# Patient Record
Sex: FEMALE | Race: WHITE | NOT HISPANIC OR LATINO | Employment: OTHER | URBAN - METROPOLITAN AREA
[De-identification: names, ages, dates, MRNs, and addresses within clinical notes are randomized per-mention and may not be internally consistent; named-entity substitution may affect disease eponyms.]

---

## 2017-04-26 RX ORDER — ATORVASTATIN CALCIUM 20 MG/1
TABLET, FILM COATED ORAL
Qty: 30 TABLET | Refills: 3 | Status: SHIPPED | OUTPATIENT
Start: 2017-04-26 | End: 2017-11-09 | Stop reason: SDUPTHER

## 2017-11-10 RX ORDER — ATORVASTATIN CALCIUM 20 MG/1
TABLET, FILM COATED ORAL
Qty: 30 TABLET | Refills: 0 | Status: SHIPPED | OUTPATIENT
Start: 2017-11-10 | End: 2017-12-19 | Stop reason: SDUPTHER

## 2017-12-19 RX ORDER — ATORVASTATIN CALCIUM 20 MG/1
TABLET, FILM COATED ORAL
Qty: 30 TABLET | Refills: 0 | Status: SHIPPED | OUTPATIENT
Start: 2017-12-19 | End: 2018-02-01 | Stop reason: SDUPTHER

## 2018-02-01 RX ORDER — ATORVASTATIN CALCIUM 20 MG/1
TABLET, FILM COATED ORAL
Qty: 30 TABLET | Refills: 0 | Status: SHIPPED | OUTPATIENT
Start: 2018-02-01 | End: 2018-03-26 | Stop reason: SDUPTHER

## 2018-02-08 ENCOUNTER — OFFICE VISIT (OUTPATIENT)
Dept: FAMILY MEDICINE CLINIC | Facility: CLINIC | Age: 63
End: 2018-02-08

## 2018-02-08 VITALS
OXYGEN SATURATION: 96 % | DIASTOLIC BLOOD PRESSURE: 70 MMHG | RESPIRATION RATE: 16 BRPM | SYSTOLIC BLOOD PRESSURE: 150 MMHG | HEIGHT: 65 IN | BODY MASS INDEX: 20.26 KG/M2 | WEIGHT: 121.6 LBS | HEART RATE: 89 BPM | TEMPERATURE: 99.8 F

## 2018-02-08 DIAGNOSIS — J10.1 INFLUENZA A: ICD-10-CM

## 2018-02-08 DIAGNOSIS — R68.89 FLU-LIKE SYMPTOMS: Primary | ICD-10-CM

## 2018-02-08 LAB
FLUAV AG NPH QL: POSITIVE
FLUBV AG NPH QL IA: NEGATIVE

## 2018-02-08 PROCEDURE — 87804 INFLUENZA ASSAY W/OPTIC: CPT | Performed by: NURSE PRACTITIONER

## 2018-02-08 PROCEDURE — 99213 OFFICE O/P EST LOW 20 MIN: CPT | Performed by: NURSE PRACTITIONER

## 2018-02-08 RX ORDER — BENZONATATE 100 MG/1
100 CAPSULE ORAL 3 TIMES DAILY PRN
Qty: 21 CAPSULE | Refills: 0 | Status: SHIPPED | OUTPATIENT
Start: 2018-02-08 | End: 2018-03-01

## 2018-02-08 RX ORDER — OSELTAMIVIR PHOSPHATE 75 MG/1
75 CAPSULE ORAL 2 TIMES DAILY
Qty: 10 CAPSULE | Refills: 0 | Status: SHIPPED | OUTPATIENT
Start: 2018-02-08 | End: 2018-03-01

## 2018-02-08 NOTE — PATIENT INSTRUCTIONS
tamiflu 75mg 2x day for 5 days.   Tessalon perles 100mg up to 3x day as needed for cough.  May try mucinex OTC as needed for cough.  Encouraged smoking sensation, encouraged f/u appt for annual exam.  Tylenol OTC as needed for aches or fever.   If symptoms persist call office.   Increase fluid intake, get plenty of rest.   Patient agrees with plan of care and understands instructions. Call if worsening symptoms or any problems or concerns.

## 2018-02-08 NOTE — PROGRESS NOTES
Mack Zaragoza is a 62 y.o. female.     History of Present Illness   C/o cough, body ache,s chills, fever, HA, sore throat, nausea, she denies vomiting or diarrhea, states symptoms started about 3 days ago, she did not check temp at home, she has had fatigue, she has low grade temp today, she did not get flu shot, she states her daughter has been sick.     The following portions of the patient's history were reviewed and updated as appropriate: allergies, current medications, past family history, past medical history, past social history, past surgical history and problem list.    Review of Systems   Constitutional: Positive for chills, diaphoresis, fatigue and fever.   HENT: Positive for congestion, rhinorrhea, sinus pressure and sore throat. Negative for ear pain.    Eyes: Negative for pain.   Respiratory: Positive for cough. Negative for shortness of breath.    Cardiovascular: Negative for chest pain.   Gastrointestinal: Positive for nausea. Negative for diarrhea and vomiting.   Musculoskeletal: Positive for myalgias. Negative for arthralgias.   Neurological: Positive for headaches. Negative for dizziness and light-headedness.   All other systems reviewed and are negative.      Objective   Physical Exam   Constitutional: She is oriented to person, place, and time. She appears well-developed and well-nourished.   HENT:   Head: Normocephalic.   Right Ear: Tympanic membrane and ear canal normal.   Left Ear: Tympanic membrane and ear canal normal.   Nose: Right sinus exhibits maxillary sinus tenderness. Left sinus exhibits maxillary sinus tenderness.   Mouth/Throat: Uvula is midline and mucous membranes are normal. Posterior oropharyngeal erythema present.   Eyes: Pupils are equal, round, and reactive to light.   Neck: Neck supple.   Cardiovascular: Normal rate, regular rhythm and normal heart sounds.    Pulmonary/Chest: Effort normal. No respiratory distress. She has decreased breath sounds in the right  lower field and the left lower field. She has no wheezes. She has no rhonchi. She has no rales.   Musculoskeletal: Normal range of motion.   Neurological: She is alert and oriented to person, place, and time.   Skin: Skin is warm and dry.   Psychiatric: She has a normal mood and affect. Her behavior is normal. Judgment and thought content normal.   Nursing note and vitals reviewed.      Assessment/Plan   Arline was seen today for flu symptoms.    Diagnoses and all orders for this visit:    Flu-like symptoms  -     Influenza Antigen, Rapid - Swab, Nasopharynx    Influenza A    Other orders  -     oseltamivir (TAMIFLU) 75 MG capsule; Take 1 capsule by mouth 2 (Two) Times a Day.  -     benzonatate (TESSALON PERLES) 100 MG capsule; Take 1 capsule by mouth 3 (Three) Times a Day As Needed for Cough.      tamiflu 75mg 2x day for 5 days.   Tessalon perles 100mg up to 3x day as needed for cough.  May try mucinex OTC as needed for cough.  Encouraged smoking sensation, encouraged f/u appt for annual exam.  Tylenol OTC as needed for aches or fever.   If symptoms persist call office.   Increase fluid intake, get plenty of rest.   Patient agrees with plan of care and understands instructions. Call if worsening symptoms or any problems or concerns.

## 2018-03-01 ENCOUNTER — TELEPHONE (OUTPATIENT)
Dept: FAMILY MEDICINE CLINIC | Facility: CLINIC | Age: 63
End: 2018-03-01

## 2018-03-01 ENCOUNTER — OFFICE VISIT (OUTPATIENT)
Dept: FAMILY MEDICINE CLINIC | Facility: CLINIC | Age: 63
End: 2018-03-01

## 2018-03-01 ENCOUNTER — RESULTS ENCOUNTER (OUTPATIENT)
Dept: FAMILY MEDICINE CLINIC | Facility: CLINIC | Age: 63
End: 2018-03-01

## 2018-03-01 VITALS
OXYGEN SATURATION: 96 % | BODY MASS INDEX: 20.12 KG/M2 | HEIGHT: 65 IN | TEMPERATURE: 99 F | HEART RATE: 82 BPM | RESPIRATION RATE: 16 BRPM | SYSTOLIC BLOOD PRESSURE: 122 MMHG | WEIGHT: 120.8 LBS | DIASTOLIC BLOOD PRESSURE: 84 MMHG

## 2018-03-01 DIAGNOSIS — Z12.11 COLON CANCER SCREENING: ICD-10-CM

## 2018-03-01 DIAGNOSIS — F17.200 CURRENT SMOKER: ICD-10-CM

## 2018-03-01 DIAGNOSIS — M79.641 RIGHT HAND PAIN: ICD-10-CM

## 2018-03-01 DIAGNOSIS — Z12.31 ENCOUNTER FOR SCREENING MAMMOGRAM FOR BREAST CANCER: ICD-10-CM

## 2018-03-01 DIAGNOSIS — M54.50 RIGHT-SIDED LOW BACK PAIN WITHOUT SCIATICA, UNSPECIFIED CHRONICITY: ICD-10-CM

## 2018-03-01 DIAGNOSIS — E78.5 HYPERLIPIDEMIA, UNSPECIFIED HYPERLIPIDEMIA TYPE: Primary | ICD-10-CM

## 2018-03-01 LAB
ALBUMIN SERPL-MCNC: 4.6 G/DL (ref 3.5–5.2)
ALBUMIN/GLOB SERPL: 1.6 G/DL
ALP SERPL-CCNC: 91 U/L (ref 39–117)
ALT SERPL W P-5'-P-CCNC: 26 U/L (ref 1–33)
ANION GAP SERPL CALCULATED.3IONS-SCNC: 13.4 MMOL/L
AST SERPL-CCNC: 29 U/L (ref 1–32)
BACTERIA UR QL AUTO: ABNORMAL /HPF
BILIRUB SERPL-MCNC: 0.8 MG/DL (ref 0.1–1.2)
BILIRUB UR QL STRIP: ABNORMAL
BUN BLD-MCNC: 8 MG/DL (ref 8–23)
BUN/CREAT SERPL: 11.3 (ref 7–25)
CALCIUM SPEC-SCNC: 9.5 MG/DL (ref 8.6–10.5)
CHLORIDE SERPL-SCNC: 101 MMOL/L (ref 98–107)
CHOLEST SERPL-MCNC: 167 MG/DL (ref 0–200)
CHROMATIN AB SERPL-ACNC: <10 IU/ML (ref 0–14)
CLARITY UR: CLEAR
CO2 SERPL-SCNC: 26.6 MMOL/L (ref 22–29)
COLOR UR: YELLOW
CREAT BLD-MCNC: 0.71 MG/DL (ref 0.57–1)
CRP SERPL-MCNC: 0.21 MG/DL (ref 0–0.5)
ERYTHROCYTE [DISTWIDTH] IN BLOOD BY AUTOMATED COUNT: 13.6 % (ref 4.5–15)
ERYTHROCYTE [SEDIMENTATION RATE] IN BLOOD: 14 MM/HR (ref 0–30)
GFR SERPL CREATININE-BSD FRML MDRD: 83 ML/MIN/1.73
GLOBULIN UR ELPH-MCNC: 2.8 GM/DL
GLUCOSE BLD-MCNC: 105 MG/DL (ref 65–99)
GLUCOSE UR STRIP-MCNC: NEGATIVE MG/DL
HCT VFR BLD AUTO: 44.9 % (ref 31–42)
HDLC SERPL-MCNC: 85 MG/DL (ref 40–60)
HGB BLD-MCNC: 14.8 G/DL (ref 12–18)
HGB UR QL STRIP.AUTO: NEGATIVE
KETONES UR QL STRIP: ABNORMAL
LDLC SERPL CALC-MCNC: 59 MG/DL (ref 0–100)
LDLC/HDLC SERPL: 0.69 {RATIO}
LEUKOCYTE ESTERASE UR QL STRIP.AUTO: NEGATIVE
LYMPHOCYTES # BLD AUTO: 2.5 10*3/MM3 (ref 1.2–3.4)
LYMPHOCYTES NFR BLD AUTO: 22.5 % (ref 21–51)
MCH RBC QN AUTO: 30.7 PG (ref 26.1–33.1)
MCHC RBC AUTO-ENTMCNC: 33 G/DL (ref 33–37)
MCV RBC AUTO: 93 FL (ref 80–99)
MONOCYTES # BLD AUTO: 0.7 10*3/MM3 (ref 0.1–0.6)
MONOCYTES NFR BLD AUTO: 6.1 % (ref 2–9)
NEUTROPHILS # BLD AUTO: 7.8 10*3/MM3 (ref 1.4–6.5)
NEUTROPHILS NFR BLD AUTO: 71.4 % (ref 42–75)
NITRITE UR QL STRIP: NEGATIVE
PH UR STRIP.AUTO: 7 [PH] (ref 4.6–8)
PLATELET # BLD AUTO: 161 10*3/MM3 (ref 150–450)
PMV BLD AUTO: 9.4 FL (ref 7.1–10.5)
POTASSIUM BLD-SCNC: 4.1 MMOL/L (ref 3.5–5.2)
PROT SERPL-MCNC: 7.4 G/DL (ref 6–8.5)
PROT UR QL STRIP: ABNORMAL
RBC # BLD AUTO: 4.83 10*6/MM3 (ref 4–6)
RBC # UR: ABNORMAL /HPF
REF LAB TEST METHOD: ABNORMAL
SODIUM BLD-SCNC: 141 MMOL/L (ref 136–145)
SP GR UR STRIP: 1.02 (ref 1–1.03)
SQUAMOUS #/AREA URNS HPF: ABNORMAL /HPF
TRIGL SERPL-MCNC: 117 MG/DL (ref 0–150)
TSH SERPL DL<=0.05 MIU/L-ACNC: 1.28 MIU/ML (ref 0.27–4.2)
URATE SERPL-MCNC: 4 MG/DL (ref 2.4–5.7)
UROBILINOGEN UR QL STRIP: ABNORMAL
VLDLC SERPL-MCNC: 23.4 MG/DL (ref 5–40)
WBC NRBC COR # BLD: 10.9 10*3/MM3 (ref 4.5–10)
WBC UR QL AUTO: ABNORMAL /HPF

## 2018-03-01 PROCEDURE — 80053 COMPREHEN METABOLIC PANEL: CPT | Performed by: NURSE PRACTITIONER

## 2018-03-01 PROCEDURE — 81001 URINALYSIS AUTO W/SCOPE: CPT | Performed by: NURSE PRACTITIONER

## 2018-03-01 PROCEDURE — 84550 ASSAY OF BLOOD/URIC ACID: CPT | Performed by: NURSE PRACTITIONER

## 2018-03-01 PROCEDURE — 85025 COMPLETE CBC W/AUTO DIFF WBC: CPT | Performed by: NURSE PRACTITIONER

## 2018-03-01 PROCEDURE — 73130 X-RAY EXAM OF HAND: CPT | Performed by: NURSE PRACTITIONER

## 2018-03-01 PROCEDURE — 86431 RHEUMATOID FACTOR QUANT: CPT | Performed by: NURSE PRACTITIONER

## 2018-03-01 PROCEDURE — 99214 OFFICE O/P EST MOD 30 MIN: CPT | Performed by: NURSE PRACTITIONER

## 2018-03-01 PROCEDURE — 84443 ASSAY THYROID STIM HORMONE: CPT | Performed by: NURSE PRACTITIONER

## 2018-03-01 PROCEDURE — 85652 RBC SED RATE AUTOMATED: CPT | Performed by: NURSE PRACTITIONER

## 2018-03-01 PROCEDURE — 36415 COLL VENOUS BLD VENIPUNCTURE: CPT | Performed by: NURSE PRACTITIONER

## 2018-03-01 PROCEDURE — 86140 C-REACTIVE PROTEIN: CPT | Performed by: NURSE PRACTITIONER

## 2018-03-01 PROCEDURE — 86038 ANTINUCLEAR ANTIBODIES: CPT | Performed by: NURSE PRACTITIONER

## 2018-03-01 PROCEDURE — 80061 LIPID PANEL: CPT | Performed by: NURSE PRACTITIONER

## 2018-03-01 RX ORDER — ASPIRIN 81 MG/1
81 TABLET ORAL DAILY
COMMUNITY

## 2018-03-01 NOTE — TELEPHONE ENCOUNTER
PATIENT WAS SEEN TODAY AND CALLED HER INSURANCE COMPANY TO SEE IF LOW DOSE CT LUNG CANCER SCREENING WAS COVERED. INSURANCE COMPANY STATED THAT DR. OFFICE WOULD HAVE TO INSURANCE COMPANY AND LET THEM KNOW THAT THE CT WAS GOING TO BE SET UP AND WHEN

## 2018-03-01 NOTE — PATIENT INSTRUCTIONS
Right hand xray today will call with results.  Labs today will call with results.   UA today,   If back pain persists call office.   Apply ice to hand, elevate, wrist splint. May try OTC tylenol as needed for pain, if persists call office will refer to hand.  Refer for screening mammo, CBE today.  Encouraged smoking cessation,   She will check with insurance for low dose CT, deferred today.  cologuard ordered.   Encouraged low chol diet and exercise, cont f/u with vascular as scheduled.   Monitor BP call with elevated readings, WNL today.   Increase fluid intake, get plenty of rest.   Patient agrees with plan of care and understands instructions. Call if worsening symptoms or any problems or concerns.

## 2018-03-01 NOTE — PROGRESS NOTES
Subjective   Arline Zaragoza is a 63 y.o. female.     History of Present Illness   Here today to f/u, she is fasting today, due for labs. She currently takes lipitor 20mg daily, baby asa 81mg daily. She states she has noticed elevated BP, he had US neck, had surgery on carotids, sees Dr. Arnulfo Hernandez, vascular for this,  this week and elevated BP, she was sick at last visit and noticed elevated BP. She denies CP, SOA, LE edema, change in vision, HA.   She does smoke 1/2 ppd for about 40 + years. She has tried quit, she tried chantix but had mood changes.   She also c/o right wrist swelling, unsure of injury, she noticed swelling about 3 days ago, she states she has pain with turning, she does use computer daily, she states turning hurts, she denies weakness, she does not see hand surgery. She did not try any thing at home for this.   She has never had colonoscopy, she would like to try cologuard.   She is due for mammo, she states last mammo 2 years ago, she does not see GYN. She has had hysterectomy, total but has ovaries, she states last pap years ago.   She has right back pain,started last night, she denies dysuria, denies frequency, she denies vag bleeding or d/c, she denies fever. Hx of ruptured disc, she went to PT for this, she denies any recent injury to back. Denies sciatic pain.   The following portions of the patient's history were reviewed and updated as appropriate: allergies, current medications, past family history, past medical history, past social history, past surgical history and problem list.    Review of Systems   Constitutional: Negative for chills, diaphoresis and fever.   Eyes: Negative for photophobia and visual disturbance.   Respiratory: Negative for cough and shortness of breath.    Cardiovascular: Negative for chest pain.   Musculoskeletal: Positive for arthralgias and myalgias.   Skin: Negative for pallor.   Neurological: Negative for dizziness, light-headedness and headaches.   All other  systems reviewed and are negative.      Objective   Physical Exam   Constitutional: She is oriented to person, place, and time. She appears well-developed and well-nourished.   HENT:   Head: Normocephalic.   Eyes: Pupils are equal, round, and reactive to light.   Neck: Neck supple. Carotid bruit is not present.   Cardiovascular: Normal rate, regular rhythm and normal heart sounds.    Pulses:       Radial pulses are 2+ on the right side, and 2+ on the left side.        Dorsalis pedis pulses are 2+ on the right side, and 2+ on the left side.        Posterior tibial pulses are 2+ on the right side, and 2+ on the left side.   Pulmonary/Chest: Effort normal and breath sounds normal. No respiratory distress. She has no decreased breath sounds. She has no wheezes. She has no rhonchi. She has no rales. Right breast exhibits no inverted nipple, no mass, no nipple discharge, no skin change and no tenderness. Left breast exhibits no inverted nipple, no mass, no nipple discharge, no skin change and no tenderness.   Abdominal: There is no CVA tenderness.   Musculoskeletal: Normal range of motion.        Right hand: She exhibits bony tenderness and swelling. She exhibits normal range of motion, no tenderness and normal capillary refill. Normal sensation noted. Normal strength noted.        Hands:  Neurological: She is alert and oriented to person, place, and time.   Skin: Skin is warm and dry.   Psychiatric: She has a normal mood and affect. Her behavior is normal. Judgment and thought content normal.   Nursing note and vitals reviewed.  right hand xray in office for pain, no comparison, shows NAD, awaiting radiology over read.     Assessment/Plan   Arline was seen today for follow-up and wrist pain.    Diagnoses and all orders for this visit:    Hyperlipidemia, unspecified hyperlipidemia type  -     CBC & Differential  -     Comprehensive Metabolic Panel  -     Lipid Panel  -     TSH  -     Urinalysis With Microscopic - Urine,  Clean Catch  -     XR Hand 3+ View Right (In Office)  -     Uric acid  -     Rheumatoid Factor, Quant  -     Sedimentation Rate  -     C-reactive Protein  -     REYNA  -     Urinalysis - Urine, Clean Catch  -     Urinalysis, Microscopic Only - Urine, Clean Catch  -     CBC Auto Differential    Right hand pain  -     CBC & Differential  -     Comprehensive Metabolic Panel  -     Lipid Panel  -     TSH  -     Urinalysis With Microscopic - Urine, Clean Catch  -     XR Hand 3+ View Right (In Office)  -     Uric acid  -     Rheumatoid Factor, Quant  -     Sedimentation Rate  -     C-reactive Protein  -     REYNA  -     Miscellaneous DME  -     Urinalysis - Urine, Clean Catch  -     Urinalysis, Microscopic Only - Urine, Clean Catch  -     CBC Auto Differential    Current smoker  -     CBC & Differential  -     Comprehensive Metabolic Panel  -     Lipid Panel  -     TSH  -     Urinalysis With Microscopic - Urine, Clean Catch  -     XR Hand 3+ View Right (In Office)  -     Uric acid  -     Rheumatoid Factor, Quant  -     Sedimentation Rate  -     C-reactive Protein  -     REYNA  -     Urinalysis - Urine, Clean Catch  -     Urinalysis, Microscopic Only - Urine, Clean Catch  -     CBC Auto Differential    Right-sided low back pain without sciatica, unspecified chronicity  -     CBC & Differential  -     Comprehensive Metabolic Panel  -     Lipid Panel  -     TSH  -     Urinalysis With Microscopic - Urine, Clean Catch  -     XR Hand 3+ View Right (In Office)  -     Uric acid  -     Rheumatoid Factor, Quant  -     Sedimentation Rate  -     C-reactive Protein  -     REYNA  -     Urinalysis - Urine, Clean Catch  -     Urinalysis, Microscopic Only - Urine, Clean Catch  -     CBC Auto Differential    Encounter for screening mammogram for breast cancer  -     Mammo Screening Bilateral With CAD; Future    Colon cancer screening  -     Cologuard - Stool, Per Rectum; Future        Right hand xray today will call with results.  Labs today will  call with results.   UA today,   If back pain persists call office.   Apply ice to hand, elevate, wrist splint. May try OTC tylenol as needed for pain, if persists call office will refer to hand.  Refer for screening mammo, CBE today.  Encouraged smoking cessation,   She will check with insurance for low dose CT, deferred today.  cologuard ordered.   Encouraged low chol diet and exercise, cont f/u with vascular as scheduled.   Monitor BP call with elevated readings, WNL today.   Increase fluid intake, get plenty of rest.   Patient agrees with plan of care and understands instructions. Call if worsening symptoms or any problems or concerns.

## 2018-03-02 DIAGNOSIS — F17.200 CURRENT SMOKER: Primary | ICD-10-CM

## 2018-03-02 DIAGNOSIS — M54.50 RIGHT-SIDED LOW BACK PAIN WITHOUT SCIATICA, UNSPECIFIED CHRONICITY: Primary | ICD-10-CM

## 2018-03-02 LAB — ANA SER QL: NEGATIVE

## 2018-03-05 ENCOUNTER — TELEPHONE (OUTPATIENT)
Dept: FAMILY MEDICINE CLINIC | Facility: CLINIC | Age: 63
End: 2018-03-05

## 2018-03-05 NOTE — TELEPHONE ENCOUNTER
----- Message from IGNACIO Gutierrez sent at 3/2/2018  8:45 AM EST -----  Please call patient with results. Arthritis labs are normal, thyroid is normal, BS, kidney and liver func are normal. CHOL is good. Urine shows protein, may want to repeat next week, lab only appt to ensure resolution.    Pt informed of lab results

## 2018-03-12 ENCOUNTER — TELEPHONE (OUTPATIENT)
Dept: FAMILY MEDICINE CLINIC | Facility: CLINIC | Age: 63
End: 2018-03-12

## 2018-03-12 NOTE — TELEPHONE ENCOUNTER
Right hand xray is normal other than arthritis at index finger, is she still having pain? Did she try the brace?

## 2018-03-13 NOTE — TELEPHONE ENCOUNTER
Pt informed of Xray results and she stated her hand hurts a little bit with a little swelling. She did try the brace

## 2018-03-14 DIAGNOSIS — M79.641 RIGHT HAND PAIN: Primary | ICD-10-CM

## 2018-03-20 LAB — SCAN RESULT: NORMAL

## 2018-03-26 ENCOUNTER — TELEPHONE (OUTPATIENT)
Dept: FAMILY MEDICINE CLINIC | Facility: CLINIC | Age: 63
End: 2018-03-26

## 2018-03-26 RX ORDER — ATORVASTATIN CALCIUM 20 MG/1
20 TABLET, FILM COATED ORAL EVERY EVENING
Qty: 90 TABLET | Refills: 2 | Status: SHIPPED | OUTPATIENT
Start: 2018-03-26 | End: 2019-03-05 | Stop reason: SDUPTHER

## 2018-07-05 ENCOUNTER — OFFICE VISIT (OUTPATIENT)
Dept: ORTHOPEDIC SURGERY | Facility: CLINIC | Age: 63
End: 2018-07-05

## 2018-07-05 ENCOUNTER — OFFICE VISIT (OUTPATIENT)
Dept: FAMILY MEDICINE CLINIC | Facility: CLINIC | Age: 63
End: 2018-07-05

## 2018-07-05 VITALS
HEART RATE: 72 BPM | TEMPERATURE: 97.2 F | BODY MASS INDEX: 20.09 KG/M2 | WEIGHT: 125 LBS | HEIGHT: 66 IN | RESPIRATION RATE: 18 BRPM | OXYGEN SATURATION: 99 %

## 2018-07-05 VITALS
DIASTOLIC BLOOD PRESSURE: 90 MMHG | BODY MASS INDEX: 20.83 KG/M2 | OXYGEN SATURATION: 99 % | HEIGHT: 65 IN | HEART RATE: 82 BPM | WEIGHT: 125 LBS | TEMPERATURE: 98.3 F | SYSTOLIC BLOOD PRESSURE: 152 MMHG

## 2018-07-05 DIAGNOSIS — M25.571 ACUTE RIGHT ANKLE PAIN: ICD-10-CM

## 2018-07-05 DIAGNOSIS — S93.401A SPRAIN OF RIGHT ANKLE, UNSPECIFIED LIGAMENT, INITIAL ENCOUNTER: Primary | ICD-10-CM

## 2018-07-05 DIAGNOSIS — S92.351A CLOSED DISPLACED FRACTURE OF FIFTH METATARSAL BONE OF RIGHT FOOT, INITIAL ENCOUNTER: Primary | ICD-10-CM

## 2018-07-05 DIAGNOSIS — S92.351A CLOSED DISPLACED FRACTURE OF FIFTH METATARSAL BONE OF RIGHT FOOT, INITIAL ENCOUNTER: ICD-10-CM

## 2018-07-05 DIAGNOSIS — Z72.0 TOBACCO ABUSE: ICD-10-CM

## 2018-07-05 PROCEDURE — 73610 X-RAY EXAM OF ANKLE: CPT | Performed by: INTERNAL MEDICINE

## 2018-07-05 PROCEDURE — 99213 OFFICE O/P EST LOW 20 MIN: CPT | Performed by: INTERNAL MEDICINE

## 2018-07-05 PROCEDURE — 28470 CLTX METATARSAL FX WO MNP EA: CPT | Performed by: ORTHOPAEDIC SURGERY

## 2018-07-05 PROCEDURE — 73630 X-RAY EXAM OF FOOT: CPT | Performed by: INTERNAL MEDICINE

## 2018-07-05 PROCEDURE — 99406 BEHAV CHNG SMOKING 3-10 MIN: CPT | Performed by: ORTHOPAEDIC SURGERY

## 2018-07-05 PROCEDURE — 99204 OFFICE O/P NEW MOD 45 MIN: CPT | Performed by: ORTHOPAEDIC SURGERY

## 2018-07-05 NOTE — PROGRESS NOTES
Subjective   Arline Zaragoza is a 63 y.o. female.   Walking is a.m. felt-year-old foot and ankle her bone pop with pain in both foot and ankle more so in foot  History of Present Illness   As above ankle pain as well as foot pain does note that she is crit coming up visit strip Tuesday was make sure things are satisfactory for that.  No history of ankle    Review of Systems   Musculoskeletal:        Right ankle pain   All other systems reviewed and are negative.      Objective   Vitals:    07/05/18 1307   BP: 152/90   Pulse: 82   Temp: 98.3 °F (36.8 °C)   SpO2: 99%   Weight: 56.7 kg (125 lb)     Physical Exam   Constitutional: She appears well-developed and well-nourished.   HENT:   Head: Normocephalic and atraumatic.   Eyes: Conjunctivae are normal. Pupils are equal, round, and reactive to light.   Cardiovascular: Normal rate, regular rhythm and normal heart sounds.    Pulmonary/Chest: Effort normal and breath sounds normal.   Musculoskeletal:   Right posterior tibial and dorsalis pedis pulses are 2+ mild swelling of the lateral ankle.  Minimal bruising there as well as lateral dorsal foot.  Ankles nontender palpation.  Do have focal tenderness over the fourth and fifth metatarsals mid shaft and slightly distal two thirds.  Mild pain with gentle bowing of foot.  Did not stress ankle pinning x-rays which did not show a spiral fracture of the   Nursing note and vitals reviewed.      Lab Results   Component Value Date    INR 0.99 09/07/2016       Procedures  X-ray right ankle 3 views obtained.  No comparison available.  No bony soft tissue abnormalities noted.    X-ray right foot 2 views obtained.  No comparison available.  We have a spiral fracture mildly displaced fifth metatarsal we want surgeon to see today.  Assessment/Plan   1.  Fracture right fifth metatarsal plan nothing by mouth see orthopedic surgeon today    2.  Right ankle pain/sprain refer to orthopedic surgeon on this.  Did not stress ankle due to other  existing injury.    Much of this encounter note is an electronic transcription/translation of spoken language to printed text.  The electronic translation of spoken language may permit erroneous, or at times, nonsensical words or phrases to be inadvertently transcribed.  Although I have reviewed the note for such errors, some may still exist. If there are questions or for further clarification, please contact me.

## 2018-07-05 NOTE — PROGRESS NOTES
New Patient Complaint      Patient: Arilne Zaragoza  YOB: 1955 63 y.o. female  Medical Record Number: [id number]    Chief Complaints: I hurt my foot and ankle    History of Present Illness:  Patient sustained an injury this morning when she tripped walking through her house.  She's had onset of severe constant aching pain with bruising and swelling mainly in the distal lateral forefoot and some to the anterolateral ankle worse with sitting driving and walking.  She was seen by her PCP this morning where x-rays were made and she was worked in today for further evaluation.        HPI    Allergies: No Known Allergies    Medications:   Current Outpatient Prescriptions on File Prior to Visit   Medication Sig   • aspirin 81 MG EC tablet Take 81 mg by mouth Daily.   • atorvastatin (LIPITOR) 20 MG tablet Take 1 tablet by mouth Every Evening.     No current facility-administered medications on file prior to visit.        Past Medical History:   Diagnosis Date   • Back pain     2005 had ruptured disc no surgery so prn c/o pain   • Cancer (CMS/HCC)     cervical   • Carotid artery stenosis    • Fibromuscular dysplasia (CMS/HCC)     dx 2 yrs ago   • Hyperlipidemia    • Psoriasis      Past Surgical History:   Procedure Laterality Date   • PARTIAL HYSTERECTOMY     • VA THROMBOENDARTECTMY NECK,NECK INCIS Left 9/14/2016    Procedure: LT CAROTID ENDARTERECTOMY WITH INTRA OPERATIVE CAROTID DUPLEX SCAN;  Surgeon: Arnulfo Hernandez MD;  Location: [facility];  Service: Vascular     Social History     Occupational History   • Not on file.     Social History Main Topics   • Smoking status: Current Every Day Smoker     Packs/day: 1.00     Years: 40.00     Types: Cigarettes, Electronic Cigarette   • Smokeless tobacco: Never Used   • Alcohol use 0.6 - 1.2 oz/week     1 - 2 Cans of beer per week   • Drug use: No   • Sexual activity: Not on file      Social History     Social History Narrative   • No narrative on file  "    Family History   Problem Relation Age of Onset   • Vision loss Mother        Review of Systems: 14 point review of systems performed, positive pertinent findings identified in HPI. All remaining systems negative     Review of Systems      Physical Exam:   Vitals:    07/05/18 1602   Pulse: 72   Resp: 18   Temp: 97.2 °F (36.2 °C)   SpO2: 99%   Weight: 56.7 kg (125 lb)   Height: 166.4 cm (65.5\")   PainSc: 10-Worst pain ever     Physical Exam   Constitutional: pleasant, well developed   Eyes: sclera non icteric  Hearing : adequate for exam  Cardiovascular: palpable pulses in right foot, right calf/ thigh NT without sign of DVT  Respiratoy: breathing unlabored   Neurological: grossly sensate to LT throughout right LE  Psychiatric: oriented with normal mood and affect.   Lymphatic: No palpable popliteal lymphadenopathy right LE  Skin: intact throughout right leg/foot  Musculoskeletal: Mild swelling with ecchymosis over the anterolateral ligamentous structures and minimal discomfort along the peroneal tendons.  She was able to actively harry without subluxation of peroneal tendons grossly stable anterior drawer but exam was limited due to pain and swelling.  No focal pain over the syndesmosis to palpation nor with proximal fibular compression or external rotation.  Nontender over the medial deltoid.    Moderate swelling and ecchymosis over the distal lateral forefoot with tenderness to palpation but no clinical deformity.  Nontender over the dorsomedial midfoot or forefoot.  Physical Exam  Ortho Exam    Radiology: 3 views the right ankle that she brought in today were reviewed.  Also ordered and reviewed 3 views of the right foot to evaluate for fracture.  The disc that she brought in did not have the foot x-rays on it.    I do not see any obvious fracture or malalignment around the ankle and no widening of the syndesmosis.    There is an oblique fracture of the distal one half of the fifth metatarsal with slight " shortening but no angular deformity    Assessment/Plan: 1.  Right ankle anterolateral ligamentous sprain with peroneal tendinitis  2.  Right fifth metatarsal fracture    I reviewed treatment options with her today and we'll hold off on any further imaging on her ankle that she has only minimal discomfort along the peroneal tendons.    I reviewed with her that I feel that her fifth metatarsal fracture is amenable to nonoperative treatment at this time but should it displace or fail to heal and may require surgical treatment.    She was fitted with a boot today and may do partial foot flat weightbearing with crutches    Regarding persistent habitual smoking.  I have discussed for at least 4 minutes with the patient the ill effects of continued smoking on pulmonary and cardiovascular health as well as financial burden.  I also discussed at length the effects on peripheral circulation as well as inhibition of soft tissue and bone healing.  I've recommended that they speak with their primary care physician regarding cessation techniques.    I will see her back in 2 week's with x-rays of her right foot

## 2018-07-09 ENCOUNTER — HOSPITAL ENCOUNTER (EMERGENCY)
Facility: HOSPITAL | Age: 63
Discharge: HOME | End: 2018-07-09
Attending: EMERGENCY MEDICINE | Admitting: EMERGENCY MEDICINE

## 2018-07-09 ENCOUNTER — TELEPHONE (OUTPATIENT)
Dept: ORTHOPEDIC SURGERY | Facility: CLINIC | Age: 63
End: 2018-07-09

## 2018-07-09 VITALS
BODY MASS INDEX: 20.83 KG/M2 | OXYGEN SATURATION: 99 % | RESPIRATION RATE: 18 BRPM | DIASTOLIC BLOOD PRESSURE: 82 MMHG | HEIGHT: 65 IN | TEMPERATURE: 98.7 F | HEART RATE: 64 BPM | WEIGHT: 125 LBS | SYSTOLIC BLOOD PRESSURE: 184 MMHG

## 2018-07-09 DIAGNOSIS — S92.351A DISPLACED FRACTURE OF FIFTH METATARSAL BONE, RIGHT FOOT, INITIAL ENCOUNTER FOR CLOSED FRACTURE: ICD-10-CM

## 2018-07-09 DIAGNOSIS — R60.0 EDEMA OF RIGHT FOOT: Primary | ICD-10-CM

## 2018-07-09 PROCEDURE — 99283 EMERGENCY DEPT VISIT LOW MDM: CPT

## 2018-07-09 NOTE — TELEPHONE ENCOUNTER
I returned called the patient's daughter and patient was with her.  She been doing partial foot flat weightbearing evidently and over the last 2 days developed significant increase in swelling to her right foot leg and calf.  She denied any chest pain or shortness of breath.  I recommended continued elevation and to limit weightbearing and to go to the emergency room for evaluation for DVT.  They voiced a clear understanding of this and will proceed accordingly

## 2018-07-09 NOTE — ED PROVIDER NOTES
EMERGENCY DEPARTMENT ENCOUNTER    Room Number:  39/39  Date seen:  7/9/2018  Time seen: 7:13 PM  PCP: Ryder Freed MD   Orthopedic surgeon- Dr Ko  Historian: patient, family  History Limited By: N/A      HPI:  Chief Complaint: foot swelling   Context: Arline Zaragoza is a 63 y.o. female who states that on 7/5/18, she slipped and fell while walking and then had onset of right foot pain. She reports that she followed up with Dr Ko, underwent right ankle xray that showed no acute fracture and right foot xray that showed right 5th metatarsal fracture, and was fitted with orthoboot. However, despite using the orthoboot, she has had increased right foot swelling. Today, she noticed bruising to right foot. Pt denies right thigh pain, right calf pain, increased right foot pain, chest pain, dyspnea, N/V/D, sensory loss, fevers, chills, and hx of blood clots. She reports that she called Dr Ko's office earlier today, was told to contact PMD, and PMD's office referred her to ED for further evaluation. She is not on any blood thinners. Pt has no other complaints at this time.    Location: right foot  Radiation: none  Severity: moderate  Duration: started on 7/5/18  Onset quality: gradual  Associated Symptoms: right foot swelling, right foot bruising  Previous treatment(s): Pt reports that she called Dr Ko's office earlier today, was told to contact PMD, and PMD's office referred her to ED for further evaluation.         PAST MEDICAL HISTORY  Active Ambulatory Problems     Diagnosis Date Noted   • Hyperlipidemia    • Cancer (CMS/HCC)    • CAD (coronary artery disease)    • Left carotid stenosis 09/14/2016   • Closed displaced fracture of fifth metatarsal bone of right foot 07/05/2018   • Tobacco abuse 07/05/2018   • Sprain of right ankle 07/05/2018     Resolved Ambulatory Problems     Diagnosis Date Noted   • No Resolved Ambulatory Problems     Past Medical History:   Diagnosis Date   • Back pain    •  Cancer (CMS/HCC)    • Carotid artery stenosis    • Fibromuscular dysplasia (CMS/HCC)    • Hyperlipidemia    • Psoriasis        PAST SURGICAL HISTORY  Past Surgical History:   Procedure Laterality Date   • PARTIAL HYSTERECTOMY     • [place] THROMBOENDARTECTMY NECK,NECK INCIS Left 9/14/2016    Procedure: LT CAROTID ENDARTERECTOMY WITH INTRA OPERATIVE CAROTID DUPLEX SCAN;  Surgeon: Arnulfo Hernandez MD;  Location: [facility] OR;  Service: Vascular       FAMILY HISTORY  Family History   Problem Relation Age of Onset   • Vision loss Mother        SOCIAL HISTORY  Social History     Social History   • Marital status: Single     Spouse name: N/A   • Number of children: N/A   • Years of education: N/A     Occupational History   • Not on file.     Social History Main Topics   • Smoking status: Current Every Day Smoker     Packs/day: 1.00     Years: 40.00     Types: Cigarettes, Electronic Cigarette   • Smokeless tobacco: Never Used   • Alcohol use 0.6 - 1.2 oz/week     1 - 2 Cans of beer per week   • Drug use: No   • Sexual activity: Not on file     Other Topics Concern   • Not on file     Social History Narrative   • No narrative on file       ALLERGIES  Patient has no known allergies.          REVIEW OF SYSTEMS  Review of Systems   Constitutional: Negative for chills and fever.   HENT: Negative for sore throat.    Respiratory: Negative for cough.    Cardiovascular: Negative for chest pain.   Gastrointestinal: Negative for nausea and vomiting.   Genitourinary: Negative for difficulty urinating and dysuria.   Musculoskeletal: Negative for back pain.        Increased right foot swelling, right foot bruising, no increase in right foot pain (from right 5th metatarsal fracture)   Neurological: Negative for numbness.   Psychiatric/Behavioral: The patient is not nervous/anxious.    All other systems reviewed and are negative.            PHYSICAL EXAM  ED Triage Vitals   Temp Heart Rate Resp BP SpO2   07/09/18 1737 07/09/18 1737  07/09/18 1737 07/09/18 1748 07/09/18 1737   97.7 °F (36.5 °C) 91 16 172/84 98 % WNL      Temp src Heart Rate Source Patient Position BP Location FiO2 (%)   07/09/18 1737 07/09/18 1737 07/09/18 1748 07/09/18 1748 --   Tympanic Monitor Sitting Right arm        Physical Exam   Constitutional: She is oriented to person, place, and time. She appears distressed (mild).   HENT:   Head: Normocephalic.   Eyes: EOM are normal.   Neck: Normal range of motion. Neck supple.   Cardiovascular: Normal rate, regular rhythm and normal heart sounds.    No murmur heard.  Pulses:       Dorsalis pedis pulses are 2+ on the right side.   Pulmonary/Chest: Effort normal and breath sounds normal. No respiratory distress. She has no wheezes. She has no rhonchi. She has no rales.   Abdominal: Soft. Bowel sounds are normal. She exhibits no distension. There is no tenderness.   Musculoskeletal:   No right medial thigh tenderness, no tenderness in right posterior knee or right calf, no right calf swelling, swelling and bruising of right ankle and right foot, tenderness to lateral aspect of right foot, capillary refill is 1 to 2 seconds to RLE, NV intact distally to RLE   Neurological: She is alert and oriented to person, place, and time. She has normal sensation.   Skin: Skin is warm and dry.   Psychiatric: Mood and affect normal.   Nursing note and vitals reviewed.          PROCEDURES  Procedures        MEDICATIONS GIVEN IN ER  Medications - No data to display              PROGRESS AND CONSULTS       1940- Informed pt that RLE venous duplex is not indicated at this time as pt's sx are localized to her right foot and do not extend into the right leg, she does not have right calf tenderness, and sx are expected for fracture in right foot. Advised pt to apply ice to right foot, to elevate RLE, to remain nonweightbearing on RLE, and to wear her orthoboot. Instructed to f/u closely with orthopedic surgeon for recheck. RTER warnings given. Pt  understands and agrees with plan. Addressed all questions.            MEDICAL DECISION MAKING      MDM  Number of Diagnoses or Management Options  Displaced fracture of fifth metatarsal bone, right foot, initial encounter for closed fracture:   Edema of right foot:      Amount and/or Complexity of Data Reviewed  Decide to obtain previous medical records or to obtain history from someone other than the patient: yes  Review and summarize past medical records: yes (On 7/5/18, pt was seen by Dr Ko for right foot pain s/p fall, underwent right ankle xray that showed no acute fracture and right foot xray that showed right 5th metatarsal fracture, and was fitted with orthoboot. )    Patient Progress  Patient progress: stable                 DIAGNOSIS  Final diagnoses:   Edema of right foot   Displaced fracture of fifth metatarsal bone, right foot, initial encounter for closed fracture               DISPOSITION  DISCHARGE    Patient discharged in stable condition.    Reviewed implications of diagnosis, meds, responsibility to follow up, warning signs and symptoms of possible worsening, potential complications and reasons to return to ER.    Patient/Family voiced understanding of above instructions.    Discussed plan for discharge, as there is no emergent indication for admission.  Pt/family is agreeable and understands need for follow up and repeat testing.  Pt is aware that discharge does not mean that nothing is wrong but it indicates no emergency is present and they must continue care with follow-up as given below or physician of their choice.     FOLLOW-UP  Trace Ko MD  [id number] [id number] [phone]    Schedule an appointment as soon as possible for a visit             Latest Documented Vital Signs:  As of 7:49 PM  BP- 172/84 HR- 80 Temp- 97.7 °F (36.5 °C) (Tympanic) O2 sat- 99%            --  Documentation assistance provided by sherif Jeronimo for Dr. Pete MD.   Information recorded by the scribe was done at my direction and has been verified and validated by me.         Marcus Jeronimo  07/09/18 2005       Henry Freeman MD  07/09/18 3663

## 2018-07-09 NOTE — DISCHARGE INSTRUCTIONS
Continue to ice and elevate your right foot.  Continue to wear your splint and follow up with your doctor.

## 2018-07-12 ENCOUNTER — TELEPHONE (OUTPATIENT)
Dept: SOCIAL WORK | Facility: HOSPITAL | Age: 63
End: 2018-07-12

## 2018-07-23 ENCOUNTER — OFFICE VISIT (OUTPATIENT)
Dept: ORTHOPEDIC SURGERY | Facility: CLINIC | Age: 63
End: 2018-07-23

## 2018-07-23 VITALS — BODY MASS INDEX: 20.83 KG/M2 | TEMPERATURE: 98.9 F | WEIGHT: 125 LBS | HEIGHT: 65 IN

## 2018-07-23 DIAGNOSIS — Z87.81 HISTORY OF FRACTURE: Primary | ICD-10-CM

## 2018-07-23 DIAGNOSIS — S92.351D CLOSED DISPLACED FRACTURE OF FIFTH METATARSAL BONE OF RIGHT FOOT WITH ROUTINE HEALING, SUBSEQUENT ENCOUNTER: ICD-10-CM

## 2018-07-23 DIAGNOSIS — S93.401D SPRAIN OF RIGHT ANKLE, UNSPECIFIED LIGAMENT, SUBSEQUENT ENCOUNTER: ICD-10-CM

## 2018-07-23 PROCEDURE — 73630 X-RAY EXAM OF FOOT: CPT | Performed by: ORTHOPAEDIC SURGERY

## 2018-07-23 PROCEDURE — 99212 OFFICE O/P EST SF 10 MIN: CPT | Performed by: ORTHOPAEDIC SURGERY

## 2018-07-23 NOTE — PROGRESS NOTES
"Foot Follow Up      Patient: Arline Zaragoza    YOB: 1955 63 y.o. female    Chief Complaints: Foot doing better    History of Present Illness: She was seen on 7/5/18 for a right fifth metatarsal fracture and ankle sprain that occurred that morning.    Since then she's been using her boot and crutches except going to the bathroom occasionally with no crutches.  She has only slight occasional achiness to the ankle and along the peroneal tendons and mild discomfort that is intermittent along the lateral aspect of the right foot that has improved.  HPI    ROS: Foot pain, no numbness tingling chest pain or shortness of breath  Past Medical History:   Diagnosis Date   • Back pain     2005 had ruptured disc no surgery so prn c/o pain   • Cancer (CMS/HCC)     cervical   • Carotid artery stenosis    • Fibromuscular dysplasia (CMS/HCC)     dx 2 yrs ago   • Hyperlipidemia    • Psoriasis      Physical Exam:   Vitals:    07/23/18 1032   Temp: 98.9 °F (37.2 °C)   Weight: 56.7 kg (125 lb)   Height: 165.1 cm (65\")     Well developed with normal mood.Right ankle shows slight tenderness over the anterolateral ligamentous structures but no focal pain along the peroneal tendons.  Mild discomfort along the fifth metatarsal with diminished swelling.  Nontender over the dorsum of the midfoot      Radiology: 3 views of the right foot ordered to evaluate alignment reviewed and compared with previous x-rays.  There is been no change in alignment to the oblique distal fifth metatarsal fracture there is no significant angular deformity only slight shortening      Assessment/Plan:  1.  Right ankle anterolateral ligamentous sprain with resolved peroneal tendinitis  2.  Right fifth metatarsal fracture    Reviewed her that I would not recommend anything from a surgical standpoint at this time overall she feels like she is \"much better\".  She'll continue with partial foot flat weightbearing with 2 crutches for another 10-14 days and his " pain has improved at that time may transition to one crutch under the contralateral arm that have demonstrated for her today as pain allows.    I'll see her back in 3 weeks' x-rays of her right foot

## 2018-08-13 ENCOUNTER — OFFICE VISIT (OUTPATIENT)
Dept: ORTHOPEDIC SURGERY | Facility: CLINIC | Age: 63
End: 2018-08-13

## 2018-08-13 VITALS — TEMPERATURE: 97.4 F | BODY MASS INDEX: 20.09 KG/M2 | WEIGHT: 125 LBS | HEIGHT: 66 IN

## 2018-08-13 DIAGNOSIS — S92.351D CLOSED DISPLACED FRACTURE OF FIFTH METATARSAL BONE OF RIGHT FOOT WITH ROUTINE HEALING, SUBSEQUENT ENCOUNTER: Primary | ICD-10-CM

## 2018-08-13 DIAGNOSIS — S93.401D SPRAIN OF RIGHT ANKLE, UNSPECIFIED LIGAMENT, SUBSEQUENT ENCOUNTER: ICD-10-CM

## 2018-08-13 PROCEDURE — 99212 OFFICE O/P EST SF 10 MIN: CPT | Performed by: ORTHOPAEDIC SURGERY

## 2018-08-13 PROCEDURE — 73630 X-RAY EXAM OF FOOT: CPT | Performed by: ORTHOPAEDIC SURGERY

## 2018-08-14 NOTE — PROGRESS NOTES
"Foot Follow Up      Patient: Arline Zaragoza    YOB: 1955 63 y.o. female    Chief Complaints: Foot is \"a lot better\"    History of Present Illness: Follows up fracture the right fifth metatarsal with ankle sprain that occurred on 7/5/18.  She was seen that day.  She was last seen on 7/23/18 with instructions to continue with partial foot flat weightbearing with 2 crutches and allow her to repair transition to one crutch as pain allows.  She has been continuing with 2 crutches as she felt awkward with one crutch.  She was using an under the ipsilateral arm.  Overall she feels much better with only slight occasional achiness in the right foot with some swelling at night that improves with elevation.  HPI    ROS: Foot pain  Past Medical History:   Diagnosis Date   • Back pain     2005 had ruptured disc no surgery so prn c/o pain   • Cancer (CMS/HCC)     cervical   • Carotid artery stenosis    • Fibromuscular dysplasia (CMS/HCC)     dx 2 yrs ago   • Hyperlipidemia    • Psoriasis      Physical Exam:   Vitals:    08/13/18 1530   Temp: 97.4 °F (36.3 °C)   TempSrc: Temporal Artery    Weight: 56.7 kg (125 lb)   Height: 166.4 cm (65.5\")     Well developed with normal mood.Right foot shows minimal swelling no warmth erythema and minimal if any discomfort palpation along the fifth metatarsal.  Minimal discomfort over the anterolateral ligamentous structures to the ankle was stable anterior drawer      Radiology: 3 views the right foot ordered to evaluate fracture alignment reviewed and compared with previous x-rays.  His been no change in alignment to the oblique distal fifth metatarsal fracture with some early callus formation      Assessment/Plan:  1.  Right ankle anterolateral ligamentous sprain with resolved peroneal tendinitis  2.  Right fifth metatarsal fracture    She continues to improve and we'll allow her to transition to one crutch under the contralateral arm which I demonstrated for her today again.  If " pain continues to improve over the next 10-14 days she may then transition to just her boot.    I will see her back in 4 weeks' x-rays of her right foot

## 2018-09-10 ENCOUNTER — OFFICE VISIT (OUTPATIENT)
Dept: ORTHOPEDIC SURGERY | Facility: CLINIC | Age: 63
End: 2018-09-10

## 2018-09-10 VITALS — HEIGHT: 65 IN | BODY MASS INDEX: 20.93 KG/M2 | TEMPERATURE: 99.3 F | WEIGHT: 125.6 LBS

## 2018-09-10 DIAGNOSIS — Z09 FRACTURE FOLLOW-UP: Primary | ICD-10-CM

## 2018-09-10 DIAGNOSIS — S93.401D SPRAIN OF RIGHT ANKLE, UNSPECIFIED LIGAMENT, SUBSEQUENT ENCOUNTER: ICD-10-CM

## 2018-09-10 DIAGNOSIS — S92.351D CLOSED DISPLACED FRACTURE OF FIFTH METATARSAL BONE OF RIGHT FOOT WITH ROUTINE HEALING, SUBSEQUENT ENCOUNTER: ICD-10-CM

## 2018-09-10 PROCEDURE — 73630 X-RAY EXAM OF FOOT: CPT | Performed by: ORTHOPAEDIC SURGERY

## 2018-09-10 PROCEDURE — 99024 POSTOP FOLLOW-UP VISIT: CPT | Performed by: ORTHOPAEDIC SURGERY

## 2018-09-10 NOTE — PROGRESS NOTES
"Foot Follow Up      Patient: Arline Zaragoza    YOB: 1955 63 y.o. female    Chief Complaints: Foot feeling better    History of Present Illness: Patient follows up right fifth metatarsal fracture with ankle sprain that occurred on 7/5/18 with initial evaluation that day.  She was last seen on 8/13/18 and has still been using 1 crutch and occasionally going with just her boot without any pain in the foot or ankle.  HPI    ROS: No Foot pain  Past Medical History:   Diagnosis Date   • Back pain     2005 had ruptured disc no surgery so prn c/o pain   • Cancer (CMS/HCC)     cervical   • Carotid artery stenosis    • Fibromuscular dysplasia (CMS/HCC)     dx 2 yrs ago   • Hyperlipidemia    • Psoriasis      Physical Exam:   Vitals:    09/10/18 1506   Temp: 99.3 °F (37.4 °C)   TempSrc: Temporal Artery    Weight: 57 kg (125 lb 9.6 oz)   Height: 165.1 cm (65\")     Well developed with normal mood.  Right foot showed minimal if any swelling and really no tenderness to palpation around the fifth metatarsal.  Nontender over the anterolateral ankle with stable anterior drawer.  No pain along the peroneal tendons      Radiology: 3 views the right foot ordered to evaluate fracture of the fifth metatarsal reviewed and compared with previous x-rays.  There is been no change in alignment to the oblique distal fifth metatarsal fracture with increased callus formation      Assessment/Plan:  1.  Right ankle anterolateral ligamentous sprain with resolved peroneal tendinitis  2.  Right fifth metatarsal fracture    Overall she continues to improve.  She will wean to just her boot for the next 7-10 days and if without pain may then wean out of her boot around the house but uses 30th athletic shoe for 7-10 days.  If he remains without pain at that point she may wean out of the boot altogether.    She states her ankle is feeling much better and I would not recommend any further workup for that.    If at any point she has return of pain " she'll get back in her boot and let me know otherwise I'll see her back in 4 weeks' x-rays of her right foot

## 2018-10-03 NOTE — PROGRESS NOTES
"Foot Follow Up      Patient: Arline Zaragoza    YOB: 1955 63 y.o. female    Chief Complaints: Foot getting better    History of Present Illness:Patient follows up right fifth metatarsal fracture with ankle sprain that occurred on 7/5/18 with initial evaluation that day.  She was last seen on 9/10/18 he was time she is still been using 1 crutch and occasionally going without her boot without pain in the foot or ankle.  Instructions were given for weaning out of her boot.    He states that her foot feels better than it did last time and she is only recently started weaning out of her boot with minimal if any discomfort palpation along the fifth metatarsal  HPI    ROS: Foot pain  Past Medical History:   Diagnosis Date   • Back pain     2005 had ruptured disc no surgery so prn c/o pain   • Cancer (CMS/HCC)     cervical   • Carotid artery stenosis    • Fibromuscular dysplasia (CMS/HCC)     dx 2 yrs ago   • Hyperlipidemia    • Psoriasis      Physical Exam:   Vitals:    10/08/18 1450   Temp: 98.7 °F (37.1 °C)   TempSrc: Temporal Artery    Weight: 56.7 kg (125 lb)   Height: 165.1 cm (65\")     Well developed with normal mood.  Right foot shows minimal discomfort palpation on the fifth metatarsal no clinical deformity with minimal swelling.  Right calf is nontender without sign of DVT.  Right ankle was nontender over the anterolateral ligaments were stable anterior drawer      Radiology: 3 views of the right foot ordered to evaluate fracture reviewed and compared to previous x-rays do show no change in alignment to the oblique distal fifth metatarsal fracture with increased callus formation      Assessment/Plan:  1.  Right ankle anterolateral ligamentous sprain with resolved peroneal tendinitis  2.  Right fifth metatarsal fracture    Overall she seems be improving I would not recommend anything from a surgical standpoint at this time.  She may continue weaning out of her boot initially around the house with sturdy " accommodative shoes and if pain continues to improve then weaning out of the house.    If anything worsens she'll let me know otherwise I will see her back in 4 weeks' x-rays of the right foot

## 2018-10-08 ENCOUNTER — OFFICE VISIT (OUTPATIENT)
Dept: ORTHOPEDIC SURGERY | Facility: CLINIC | Age: 63
End: 2018-10-08

## 2018-10-08 VITALS — BODY MASS INDEX: 20.83 KG/M2 | WEIGHT: 125 LBS | HEIGHT: 65 IN | TEMPERATURE: 98.7 F

## 2018-10-08 DIAGNOSIS — S93.401D SPRAIN OF RIGHT ANKLE, UNSPECIFIED LIGAMENT, SUBSEQUENT ENCOUNTER: ICD-10-CM

## 2018-10-08 DIAGNOSIS — S92.351D CLOSED DISPLACED FRACTURE OF FIFTH METATARSAL BONE OF RIGHT FOOT WITH ROUTINE HEALING, SUBSEQUENT ENCOUNTER: Primary | ICD-10-CM

## 2018-10-08 PROCEDURE — 99213 OFFICE O/P EST LOW 20 MIN: CPT | Performed by: ORTHOPAEDIC SURGERY

## 2018-10-08 PROCEDURE — 73630 X-RAY EXAM OF FOOT: CPT | Performed by: ORTHOPAEDIC SURGERY

## 2018-11-08 ENCOUNTER — OFFICE VISIT (OUTPATIENT)
Dept: ORTHOPEDIC SURGERY | Facility: CLINIC | Age: 63
End: 2018-11-08

## 2018-11-08 VITALS — BODY MASS INDEX: 21.16 KG/M2 | TEMPERATURE: 98.4 F | HEIGHT: 65 IN | WEIGHT: 127 LBS

## 2018-11-08 DIAGNOSIS — S92.351D CLOSED DISPLACED FRACTURE OF FIFTH METATARSAL BONE OF RIGHT FOOT WITH ROUTINE HEALING, SUBSEQUENT ENCOUNTER: Primary | ICD-10-CM

## 2018-11-08 DIAGNOSIS — S93.401D SPRAIN OF RIGHT ANKLE, UNSPECIFIED LIGAMENT, SUBSEQUENT ENCOUNTER: ICD-10-CM

## 2018-11-08 PROCEDURE — 73630 X-RAY EXAM OF FOOT: CPT | Performed by: ORTHOPAEDIC SURGERY

## 2018-11-08 PROCEDURE — 99213 OFFICE O/P EST LOW 20 MIN: CPT | Performed by: ORTHOPAEDIC SURGERY

## 2018-11-08 NOTE — PROGRESS NOTES
"Foot Follow Up      Patient: Arline Zaragoza    YOB: 1955 63 y.o. female    Chief Complaints: Foot feeling better    History of Present Illness:Patient follows up right fifth metatarsal fracture with ankle sprain that occurred on 7/5/18 with initial evaluation that day.   she was last seen on 10/8/18 at that time her foot felt better and she recently started weaning out of her boot with only minimal if any discomfort along the fifth metatarsal.  Instructions were given to wean out of her boot and a sturdy accommodative shoes as pain allowed.    She's been out of her boot around the house and occasionally out of the house with a sturdy accommodative shoe without significant complaints of pain in the right foot.  HPI    ROS: No Foot pain  Past Medical History:   Diagnosis Date   • Back pain     2005 had ruptured disc no surgery so prn c/o pain   • Cancer (CMS/HCC)     cervical   • Carotid artery stenosis    • Fibromuscular dysplasia (CMS/HCC)     dx 2 yrs ago   • Hyperlipidemia    • Psoriasis      Physical Exam:   Vitals:    11/08/18 1607   Temp: 98.4 °F (36.9 °C)   Weight: 57.6 kg (127 lb)   Height: 165.1 cm (65\")     Well developed with normal mood.  Right foot showed no focal swelling warmth erythema and there was no tenderness to palpation around the fifth metatarsal.  She was nontender over the anterolateral ligamentous structures to the ankle with stable anterior drawer      Radiology: 3 views the right foot ordered to evaluate fracture reviewed and compared with previous x-rays these continue to show good alignment to the fifth metatarsal fracture with callus formation      Assessment/Plan:  1.  Right ankle anterolateral ligamentous sprain with resolved peroneal tendinitis  2.  Right fifth metatarsal fracture    Overall she's doing very well she may start weaning gradually out of her boot entirely and understands use a sturdy accommodative shoe.    She may advance activities slowly as tolerated and I " will see her back in 1 month with x-rays of her right foot.

## 2019-03-05 RX ORDER — ATORVASTATIN CALCIUM 20 MG/1
TABLET, FILM COATED ORAL
Qty: 30 TABLET | Refills: 0 | Status: SHIPPED | OUTPATIENT
Start: 2019-03-05 | End: 2019-07-26 | Stop reason: SDUPTHER

## 2019-04-08 ENCOUNTER — TELEPHONE (OUTPATIENT)
Dept: FAMILY MEDICINE CLINIC | Facility: CLINIC | Age: 64
End: 2019-04-08

## 2019-04-08 NOTE — TELEPHONE ENCOUNTER
How much prednisone is she taking? We don't have it listed on her med list. I can see if it is a contraindication

## 2019-04-08 NOTE — TELEPHONE ENCOUNTER
Patient informed, she will check and see if she needs to have completed her Hep A and B immunizations prior to trip per their recommendations. She will schedule these after completed with Prednisone but can come anytime for Tetanus

## 2019-04-08 NOTE — TELEPHONE ENCOUNTER
Patient is currently on Prednisone 5mg daily x 6 weeks now, she goes back to specialist end of this month(wrist surgery)for CRPS

## 2019-04-08 NOTE — TELEPHONE ENCOUNTER
Per immunization resource: Hepatitis A and B does not contain live virus, so it may be used in patients with immunodeficiency (which long term prednisone can cause). However, response to vaccination may be suboptimal so it may be better to take after she finishes prednisone. Immunosuppression is not a contraindication or precaution with tetanus and she can get that anytime.

## 2019-04-08 NOTE — TELEPHONE ENCOUNTER
PATIENT IS GOING OUT OF THE COUNTRY TO [facility] AND NEEDS HEP A AND B AND TETANUS SHOTS.  PATIENT IS ON PREDNISONE FOR CRPS FROM SURGERY SHE HAD IN DEC.  SHOULD SHE WAIT UNTIL SHE STOPS TAKING THE PREDNISONE BEFORE GETTING THE SHOTS.

## 2019-04-29 ENCOUNTER — TELEPHONE (OUTPATIENT)
Dept: FAMILY MEDICINE CLINIC | Facility: CLINIC | Age: 64
End: 2019-04-29

## 2019-05-01 ENCOUNTER — CLINICAL SUPPORT (OUTPATIENT)
Dept: FAMILY MEDICINE CLINIC | Facility: CLINIC | Age: 64
End: 2019-05-01

## 2019-05-01 PROCEDURE — 90746 HEPB VACCINE 3 DOSE ADULT IM: CPT | Performed by: NURSE PRACTITIONER

## 2019-05-01 PROCEDURE — 90632 HEPA VACCINE ADULT IM: CPT | Performed by: NURSE PRACTITIONER

## 2019-05-01 PROCEDURE — 90472 IMMUNIZATION ADMIN EACH ADD: CPT | Performed by: NURSE PRACTITIONER

## 2019-05-01 PROCEDURE — 90471 IMMUNIZATION ADMIN: CPT | Performed by: NURSE PRACTITIONER

## 2019-05-01 PROCEDURE — 90715 TDAP VACCINE 7 YRS/> IM: CPT | Performed by: NURSE PRACTITIONER

## 2019-05-03 ENCOUNTER — OFFICE VISIT (OUTPATIENT)
Dept: ORTHOPEDIC SURGERY | Facility: CLINIC | Age: 64
End: 2019-05-03

## 2019-05-03 ENCOUNTER — TELEPHONE (OUTPATIENT)
Dept: ORTHOPEDIC SURGERY | Facility: CLINIC | Age: 64
End: 2019-05-03

## 2019-05-03 VITALS — HEIGHT: 65 IN | TEMPERATURE: 98 F | BODY MASS INDEX: 21.66 KG/M2 | WEIGHT: 130 LBS

## 2019-05-03 DIAGNOSIS — S92.025A CLOSED NONDISPLACED FRACTURE OF ANTERIOR PROCESS OF LEFT CALCANEUS, INITIAL ENCOUNTER: ICD-10-CM

## 2019-05-03 DIAGNOSIS — M79.672 PAIN IN LEFT FOOT: Primary | ICD-10-CM

## 2019-05-03 PROCEDURE — 28400 CLTX CALCANEAL FX W/O MNPJ: CPT | Performed by: ORTHOPAEDIC SURGERY

## 2019-05-03 PROCEDURE — 99214 OFFICE O/P EST MOD 30 MIN: CPT | Performed by: ORTHOPAEDIC SURGERY

## 2019-05-03 PROCEDURE — 73630 X-RAY EXAM OF FOOT: CPT | Performed by: ORTHOPAEDIC SURGERY

## 2019-05-03 NOTE — TELEPHONE ENCOUNTER
[facility] patient called stating she may have broken her left foot. Hit is on a wooden box yesterday. Some swelling and bruising and she is not able to bear weight. Please advise.

## 2019-05-03 NOTE — PROGRESS NOTES
New Patient Complaint      Patient: Arline Zaragoza  YOB: 1955 64 y.o. female  Medical Record Number: [id number]    Chief Complaints: I hurt my foot    History of Present Illness: Patient sustained injury to her left inferolateral hindfoot yesterday when she was starting to sit crosslegged and struck the side of her foot on a wooden box and felt and heard a crack.  She did not twist her ankle but has had persistent moderate constant aching pain with swelling and bruising over the inferolateral aspect the left hindfoot worse with standing and walking improved with rest.    I treated her in the past for a right fifth metatarsal fracture and she was last seen for this on 11/8/2018 and had no complaints of right foot pain today         HPI    Allergies: No Known Allergies    Medications:   Current Outpatient Medications on File Prior to Visit   Medication Sig   • aspirin 81 MG EC tablet Take 81 mg by mouth Daily.   • atorvastatin (LIPITOR) 20 MG tablet TAKE 1 TABLET EVERY EVENING     No current facility-administered medications on file prior to visit.        Past Medical History:   Diagnosis Date   • Back pain     2005 had ruptured disc no surgery so prn c/o pain   • Cancer (CMS/HCC)     cervical   • Carotid artery stenosis    • Fibromuscular dysplasia (CMS/HCC)     dx 2 yrs ago   • Hyperlipidemia    • Psoriasis      Past Surgical History:   Procedure Laterality Date   • PARTIAL HYSTERECTOMY     • [place] THROMBOENDARTECTMY NECK,NECK INCIS Left 9/14/2016    Procedure: LT CAROTID ENDARTERECTOMY WITH INTRA OPERATIVE CAROTID DUPLEX SCAN;  Surgeon: Arnulfo Hernandez MD;  Location: [facility];  Service: Vascular     Social History     Occupational History   • Not on file   Tobacco Use   • Smoking status: Current Every Day Smoker     Packs/day: 1.00     Years: 40.00     Pack years: 40.00     Types: Cigarettes, Electronic Cigarette   • Smokeless tobacco: Never Used   Substance and Sexual Activity   • Alcohol  "use: Yes     Alcohol/week: 0.6 - 1.2 oz     Types: 1 - 2 Cans of beer per week   • Drug use: No   • Sexual activity: Defer      Social History     Social History Narrative   • Not on file     Family History   Problem Relation Age of Onset   • Vision loss Mother        Review of Systems: 14 point review of systems performed, positive pertinent findings identified in HPI. All remaining systems negative     Review of Systems      Physical Exam:   Vitals:    05/03/19 1313   Temp: 98 °F (36.7 °C)   Weight: 59 kg (130 lb)   Height: 165.1 cm (65\")     Physical Exam   Constitutional: pleasant, well developed   Eyes: sclera non icteric  Hearing : adequate for exam  Cardiovascular: palpable pulses in left foot, left calf/ thigh NT without sign of DVT  Respiratoy: breathing unlabored   Neurological: grossly sensate to LT throughout left LE  Psychiatric: oriented with normal mood and affect.   Lymphatic: No palpable popliteal lymphadenopathy left LE  Skin: intact throughout left leg/foot  Musculoskeletal: Examination left hindfoot and ankle shows no warmth erythema there is mild swelling over the inferolateral aspect of the hindfoot with bruising and tenderness to palpation of the anterior process the calcaneus.  She was nontender over the medial or lateral aspect of the ankle nor along the peroneal tendons or dorsum of the midfoot or forefoot.  Physical Exam  Ortho Exam    Radiology: 3 views of the left foot ordered to evaluate pain reviewed with her and no prior x-rays available for comparison there appears to be a nondisplaced fracture of the anterior process the calcaneus but no midfoot fractures or malalignment.    Assessment/Plan: 1.  Left anterior process calcaneus fracture    Reviewed with her that do not see any clinical sign of ankle injury or peroneal tendon pathology.    She still has her boot from the contralateral side and she will do touchdown foot flat weightbearing with 2 crutches in her boot for the next 7 to " 10 days and if pain improves she may then go to 50% weightbearing with 2 crutches in her boot.  She will do this for a week and if pain continues to improve she may use one crutch under the contralateral arm and her boot.    If anything worsens to let me know otherwise I will see her back in 3 weeks x-rays of her left foot.    Reviewed with her that I do not anticipate surgical treatment for this unless she has persistent pain and would require excision of this area.

## 2019-05-03 NOTE — TELEPHONE ENCOUNTER
Left answering machine message for patient to call me back to confirm appt today with MWM at 1:00.

## 2019-05-29 ENCOUNTER — OFFICE VISIT (OUTPATIENT)
Dept: ORTHOPEDIC SURGERY | Facility: CLINIC | Age: 64
End: 2019-05-29

## 2019-05-29 VITALS — WEIGHT: 130 LBS | BODY MASS INDEX: 20.4 KG/M2 | HEIGHT: 67 IN | TEMPERATURE: 98.6 F

## 2019-05-29 DIAGNOSIS — S92.025D CLOSED NONDISPLACED FRACTURE OF ANTERIOR PROCESS OF LEFT CALCANEUS WITH ROUTINE HEALING, SUBSEQUENT ENCOUNTER: ICD-10-CM

## 2019-05-29 DIAGNOSIS — Z09 FRACTURE FOLLOW-UP: Primary | ICD-10-CM

## 2019-05-29 PROCEDURE — 73630 X-RAY EXAM OF FOOT: CPT | Performed by: ORTHOPAEDIC SURGERY

## 2019-05-29 PROCEDURE — 99024 POSTOP FOLLOW-UP VISIT: CPT | Performed by: ORTHOPAEDIC SURGERY

## 2019-05-29 NOTE — PROGRESS NOTES
"Foot Follow Up      Patient: Arline Zaragoza    YOB: 1955 64 y.o. female    Chief Complaints: Foot feeling better    History of Present Illness: Patient was seen on 5/3/2019 1 day after injuring her left inferolateral hindfoot when she was starting to sit crosslegged and struck the side of her foot on a wooden box and felt and heard a crack but did not twist her ankle.  She was felt to have a anterior process calcaneus fracture and was placed into a boot with recommendations for use of crutches meaning to one crutch as pain allowed    Prior to this she was treated for right fifth metatarsal fracture last seen for this on 11/8/2019 and has no persistent complaints of right foot pain    Her left foot is feeling better and she is been on one crutch for about a week now.  She has only occasional ache in the inferolateral hindfoot  HPI    ROS: Foot pain  Past Medical History:   Diagnosis Date   • Back pain     2005 had ruptured disc no surgery so prn c/o pain   • Cancer (CMS/HCC)     cervical   • Carotid artery stenosis    • Fibromuscular dysplasia (CMS/HCC)     dx 2 yrs ago   • Hyperlipidemia    • Psoriasis      Physical Exam:   Vitals:    05/29/19 1010   Temp: 98.6 °F (37 °C)   TempSrc: Temporal   Weight: 59 kg (130 lb)   Height: 170.2 cm (67\")   PainSc:   1   PainLoc: Foot     Well developed with normal mood.  On exam she has minimal discomfort of the inferolateral hindfoot and 5 out of 5 eversion strength without discomfort along the peroneal tendons.  Nontender over the dorsum of the midfoot      Radiology: 3 views of the left foot ordered to evaluate anterior process calcaneus fracture reviewed and compared to prior x-rays.  The anterior process calcaneus fracture appears to be consolidating without appreciable displacement.      Assessment/Plan: 1.  Left anterior process calcaneus fracture    She continues to improve and may wean off of her crutches.  She will continue with her boot for an additional 7 " to 10 days subsequent to that and then begin weaning out to an ASO brace that she will use for the next 3 to 4 weeks and if symptoms improve she may then wean out of it.  She declined any formal therapy if symptoms persist or worsen she will let me know otherwise I will see her back as needed.    Current pain is rated a 1 out of 10

## 2019-06-03 ENCOUNTER — CLINICAL SUPPORT (OUTPATIENT)
Dept: FAMILY MEDICINE CLINIC | Facility: CLINIC | Age: 64
End: 2019-06-03

## 2019-06-03 PROCEDURE — 90746 HEPB VACCINE 3 DOSE ADULT IM: CPT | Performed by: FAMILY MEDICINE

## 2019-06-03 PROCEDURE — 90471 IMMUNIZATION ADMIN: CPT | Performed by: FAMILY MEDICINE

## 2019-07-26 RX ORDER — ATORVASTATIN CALCIUM 20 MG/1
TABLET, FILM COATED ORAL
Qty: 30 TABLET | Refills: 2 | Status: SHIPPED | OUTPATIENT
Start: 2019-07-26 | End: 2019-12-02 | Stop reason: SDUPTHER

## 2019-12-02 RX ORDER — ATORVASTATIN CALCIUM 20 MG/1
TABLET, FILM COATED ORAL
Qty: 30 TABLET | Refills: 0 | Status: SHIPPED | OUTPATIENT
Start: 2019-12-02 | End: 2020-02-19 | Stop reason: SDUPTHER

## 2020-02-19 ENCOUNTER — OFFICE VISIT (OUTPATIENT)
Dept: FAMILY MEDICINE CLINIC | Facility: CLINIC | Age: 65
End: 2020-02-19

## 2020-02-19 ENCOUNTER — TELEPHONE (OUTPATIENT)
Dept: FAMILY MEDICINE CLINIC | Facility: CLINIC | Age: 65
End: 2020-02-19

## 2020-02-19 VITALS
BODY MASS INDEX: 19.46 KG/M2 | HEIGHT: 67 IN | OXYGEN SATURATION: 99 % | WEIGHT: 124 LBS | DIASTOLIC BLOOD PRESSURE: 70 MMHG | TEMPERATURE: 98 F | SYSTOLIC BLOOD PRESSURE: 120 MMHG | HEART RATE: 69 BPM | RESPIRATION RATE: 18 BRPM

## 2020-02-19 DIAGNOSIS — E78.2 MIXED HYPERLIPIDEMIA: Primary | ICD-10-CM

## 2020-02-19 DIAGNOSIS — I25.10 CORONARY ARTERY DISEASE INVOLVING NATIVE CORONARY ARTERY OF NATIVE HEART WITHOUT ANGINA PECTORIS: ICD-10-CM

## 2020-02-19 LAB
ALBUMIN SERPL-MCNC: 4.4 G/DL (ref 3.5–5.2)
ALBUMIN/GLOB SERPL: 1.7 G/DL
ALP SERPL-CCNC: 73 U/L (ref 39–117)
ALT SERPL W P-5'-P-CCNC: 12 U/L (ref 1–33)
ANION GAP SERPL CALCULATED.3IONS-SCNC: 12.6 MMOL/L (ref 5–15)
AST SERPL-CCNC: 14 U/L (ref 1–32)
BACTERIA UR QL AUTO: ABNORMAL /HPF
BILIRUB SERPL-MCNC: 0.4 MG/DL (ref 0.2–1.2)
BILIRUB UR QL STRIP: NEGATIVE
BUN BLD-MCNC: 9 MG/DL (ref 8–23)
BUN/CREAT SERPL: 13 (ref 7–25)
CALCIUM SPEC-SCNC: 9.6 MG/DL (ref 8.6–10.5)
CHLORIDE SERPL-SCNC: 102 MMOL/L (ref 98–107)
CHOLEST SERPL-MCNC: 239 MG/DL (ref 0–200)
CLARITY UR: CLEAR
CO2 SERPL-SCNC: 26.4 MMOL/L (ref 22–29)
COLOR UR: YELLOW
CREAT BLD-MCNC: 0.69 MG/DL (ref 0.57–1)
ERYTHROCYTE [DISTWIDTH] IN BLOOD BY AUTOMATED COUNT: 13.9 % (ref 12.3–15.4)
GFR SERPL CREATININE-BSD FRML MDRD: 86 ML/MIN/1.73
GLOBULIN UR ELPH-MCNC: 2.6 GM/DL
GLUCOSE BLD-MCNC: 106 MG/DL (ref 65–99)
GLUCOSE UR STRIP-MCNC: NEGATIVE MG/DL
HCT VFR BLD AUTO: 44.2 % (ref 34–46.6)
HDLC SERPL-MCNC: 75 MG/DL (ref 40–60)
HGB BLD-MCNC: 15 G/DL (ref 12–15.9)
HGB UR QL STRIP.AUTO: NEGATIVE
KETONES UR QL STRIP: NEGATIVE
LDLC SERPL CALC-MCNC: 133 MG/DL (ref 0–100)
LDLC/HDLC SERPL: 1.77 {RATIO}
LEUKOCYTE ESTERASE UR QL STRIP.AUTO: NEGATIVE
LYMPHOCYTES # BLD AUTO: 2.6 10*3/MM3 (ref 0.7–3.1)
LYMPHOCYTES NFR BLD AUTO: 30.2 % (ref 19.6–45.3)
MCH RBC QN AUTO: 31.6 PG (ref 26.6–33)
MCHC RBC AUTO-ENTMCNC: 33.9 G/DL (ref 31.5–35.7)
MCV RBC AUTO: 93.2 FL (ref 79–97)
MONOCYTES # BLD AUTO: 0.3 10*3/MM3 (ref 0.1–0.9)
MONOCYTES NFR BLD AUTO: 3.8 % (ref 5–12)
NEUTROPHILS # BLD AUTO: 5.6 10*3/MM3 (ref 1.7–7)
NEUTROPHILS NFR BLD AUTO: 66 % (ref 42.7–76)
NITRITE UR QL STRIP: NEGATIVE
PH UR STRIP.AUTO: 7 [PH] (ref 4.6–8)
PLATELET # BLD AUTO: 165 10*3/MM3 (ref 140–450)
PMV BLD AUTO: 9.2 FL (ref 6–12)
POTASSIUM BLD-SCNC: 4.7 MMOL/L (ref 3.5–5.2)
PROT SERPL-MCNC: 7 G/DL (ref 6–8.5)
PROT UR QL STRIP: NEGATIVE
RBC # BLD AUTO: 4.74 10*6/MM3 (ref 3.77–5.28)
RBC # UR: ABNORMAL /HPF
REF LAB TEST METHOD: ABNORMAL
SODIUM BLD-SCNC: 141 MMOL/L (ref 136–145)
SP GR UR STRIP: 1.02 (ref 1–1.03)
SQUAMOUS #/AREA URNS HPF: ABNORMAL /HPF
TRIGL SERPL-MCNC: 156 MG/DL (ref 0–150)
UROBILINOGEN UR QL STRIP: NORMAL
VLDLC SERPL-MCNC: 31.2 MG/DL (ref 5–40)
WBC NRBC COR # BLD: 8.5 10*3/MM3 (ref 3.4–10.8)
WBC UR QL AUTO: ABNORMAL /HPF

## 2020-02-19 PROCEDURE — 90632 HEPA VACCINE ADULT IM: CPT | Performed by: FAMILY MEDICINE

## 2020-02-19 PROCEDURE — 80061 LIPID PANEL: CPT | Performed by: FAMILY MEDICINE

## 2020-02-19 PROCEDURE — 85025 COMPLETE CBC W/AUTO DIFF WBC: CPT | Performed by: FAMILY MEDICINE

## 2020-02-19 PROCEDURE — 80053 COMPREHEN METABOLIC PANEL: CPT | Performed by: FAMILY MEDICINE

## 2020-02-19 PROCEDURE — 90472 IMMUNIZATION ADMIN EACH ADD: CPT | Performed by: FAMILY MEDICINE

## 2020-02-19 PROCEDURE — 36415 COLL VENOUS BLD VENIPUNCTURE: CPT | Performed by: FAMILY MEDICINE

## 2020-02-19 PROCEDURE — 90471 IMMUNIZATION ADMIN: CPT | Performed by: FAMILY MEDICINE

## 2020-02-19 PROCEDURE — 99213 OFFICE O/P EST LOW 20 MIN: CPT | Performed by: FAMILY MEDICINE

## 2020-02-19 PROCEDURE — 90746 HEPB VACCINE 3 DOSE ADULT IM: CPT | Performed by: FAMILY MEDICINE

## 2020-02-19 PROCEDURE — 81001 URINALYSIS AUTO W/SCOPE: CPT | Performed by: FAMILY MEDICINE

## 2020-02-19 RX ORDER — ATORVASTATIN CALCIUM 20 MG/1
20 TABLET, FILM COATED ORAL NIGHTLY
Qty: 90 TABLET | Refills: 2 | Status: SHIPPED | OUTPATIENT
Start: 2020-02-19 | End: 2020-02-20 | Stop reason: SDUPTHER

## 2020-02-19 NOTE — TELEPHONE ENCOUNTER
Pt returned call and was informed of results. She had quit taking her lipitor. She will restart and return in 1 mo for labs.     Left message for patient to return call regarding lab results    ----- Message from Ryder Freed MD sent at 2/19/2020  2:41 PM EST -----    Your lipids are not at goal.  If I am not mistaken you had not taken your Lipitor for some time.  If that is the case then restart the Lipitor and see what the lipids are in about a month along with a CMP.  If you have currently been taking Lipitor 20 mg and have not missed anything I would increase it to 40 mg and recheck CMP and fasting lipid in 1 month.

## 2020-02-19 NOTE — PROGRESS NOTES
SUBJECTIVE:  The patient is a 65-year-old white female.  She needs to finish up on her immunizations.  She has hyperlipidemia.  She continues to smoke.    PAST MEDICAL HISTORY:  Reviewed.    REVIEW OF SYSTEMS:  Please see above; all others reviewed and are negative.      OBJECTIVE:   Vitals signs are reviewed and are stable.    General:  Well-nourished.  Alert and oriented x3 in no acute distress.  HEENT: PERRLA.   Neck:  Supple.   Lungs:  Clear.    Heart:  Regular rate and rhythm.   Abdomen:   Soft, nontender.   Extremities:  No cyanosis, clubbing or edema.   Neurological:  Grossly intact without motor or sensory deficits.     ASSESSMENT:    Hyperlipidemia  Tobacco abuse  PLAN: Finish up on immunizations.  Discontinue smoking.  CMP fasting lipid CBC ordered.  Follow-up on labs.  Healthy lifestyle discussed.  Schedule breast exam mammogram.  Call if problems.    Dictated utilizing Dragon dictation.

## 2020-02-20 DIAGNOSIS — Z79.899 NEW MEDICATION ADDED: ICD-10-CM

## 2020-02-20 DIAGNOSIS — E78.5 HYPERLIPIDEMIA, UNSPECIFIED HYPERLIPIDEMIA TYPE: Primary | ICD-10-CM

## 2020-02-20 RX ORDER — ATORVASTATIN CALCIUM 20 MG/1
20 TABLET, FILM COATED ORAL NIGHTLY
Qty: 90 TABLET | Refills: 2 | Status: SHIPPED | OUTPATIENT
Start: 2020-02-20 | End: 2020-02-20 | Stop reason: SDUPTHER

## 2020-02-20 RX ORDER — ATORVASTATIN CALCIUM 20 MG/1
20 TABLET, FILM COATED ORAL NIGHTLY
Qty: 30 TABLET | Refills: 0 | Status: SHIPPED | OUTPATIENT
Start: 2020-02-20 | End: 2020-03-17 | Stop reason: SDUPTHER

## 2020-03-17 RX ORDER — ATORVASTATIN CALCIUM 20 MG/1
20 TABLET, FILM COATED ORAL NIGHTLY
Qty: 90 TABLET | Refills: 3 | Status: SHIPPED | OUTPATIENT
Start: 2020-03-17 | End: 2020-03-20 | Stop reason: SDUPTHER

## 2020-03-20 RX ORDER — ATORVASTATIN CALCIUM 20 MG/1
20 TABLET, FILM COATED ORAL NIGHTLY
Qty: 90 TABLET | Refills: 3 | Status: SHIPPED | OUTPATIENT
Start: 2020-03-20 | End: 2021-05-13 | Stop reason: SDUPTHER

## 2020-06-23 ENCOUNTER — OFFICE VISIT (OUTPATIENT)
Dept: FAMILY MEDICINE CLINIC | Facility: CLINIC | Age: 65
End: 2020-06-23

## 2020-06-23 VITALS
OXYGEN SATURATION: 96 % | WEIGHT: 125.6 LBS | HEART RATE: 78 BPM | BODY MASS INDEX: 19.71 KG/M2 | TEMPERATURE: 97.3 F | HEIGHT: 67 IN | DIASTOLIC BLOOD PRESSURE: 72 MMHG | SYSTOLIC BLOOD PRESSURE: 142 MMHG

## 2020-06-23 DIAGNOSIS — R53.83 FATIGUE, UNSPECIFIED TYPE: ICD-10-CM

## 2020-06-23 DIAGNOSIS — R07.81 RIB PAIN ON LEFT SIDE: Primary | ICD-10-CM

## 2020-06-23 DIAGNOSIS — E78.5 HYPERLIPIDEMIA, UNSPECIFIED HYPERLIPIDEMIA TYPE: ICD-10-CM

## 2020-06-23 DIAGNOSIS — Z72.0 TOBACCO ABUSE: ICD-10-CM

## 2020-06-23 DIAGNOSIS — Z79.899 NEW MEDICATION ADDED: ICD-10-CM

## 2020-06-23 DIAGNOSIS — I25.10 CORONARY ARTERY DISEASE INVOLVING NATIVE CORONARY ARTERY OF NATIVE HEART WITHOUT ANGINA PECTORIS: ICD-10-CM

## 2020-06-23 LAB
ALBUMIN SERPL-MCNC: 5.1 G/DL (ref 3.5–5.2)
ALBUMIN/GLOB SERPL: 2.4 G/DL
ALP SERPL-CCNC: 78 U/L (ref 39–117)
ALT SERPL W P-5'-P-CCNC: 23 U/L (ref 1–33)
ANION GAP SERPL CALCULATED.3IONS-SCNC: 14 MMOL/L (ref 5–15)
AST SERPL-CCNC: 27 U/L (ref 1–32)
BACTERIA UR QL AUTO: ABNORMAL /HPF
BASOPHILS # BLD AUTO: 0.05 10*3/MM3 (ref 0–0.2)
BASOPHILS NFR BLD AUTO: 0.5 % (ref 0–1.5)
BILIRUB SERPL-MCNC: 0.8 MG/DL (ref 0.2–1.2)
BILIRUB UR QL STRIP: NEGATIVE
BUN BLD-MCNC: 6 MG/DL (ref 8–23)
BUN/CREAT SERPL: 8.7 (ref 7–25)
CALCIUM SPEC-SCNC: 9.8 MG/DL (ref 8.6–10.5)
CHLORIDE SERPL-SCNC: 104 MMOL/L (ref 98–107)
CHOLEST SERPL-MCNC: 163 MG/DL (ref 0–200)
CLARITY UR: CLEAR
CO2 SERPL-SCNC: 25 MMOL/L (ref 22–29)
COLOR UR: YELLOW
CREAT BLD-MCNC: 0.69 MG/DL (ref 0.57–1)
DEPRECATED RDW RBC AUTO: 43.5 FL (ref 37–54)
EOSINOPHIL # BLD AUTO: 0.05 10*3/MM3 (ref 0–0.4)
EOSINOPHIL NFR BLD AUTO: 0.5 % (ref 0.3–6.2)
ERYTHROCYTE [DISTWIDTH] IN BLOOD BY AUTOMATED COUNT: 12.6 % (ref 12.3–15.4)
GFR SERPL CREATININE-BSD FRML MDRD: 86 ML/MIN/1.73
GLOBULIN UR ELPH-MCNC: 2.1 GM/DL
GLUCOSE BLD-MCNC: 113 MG/DL (ref 65–99)
GLUCOSE UR STRIP-MCNC: NEGATIVE MG/DL
HCT VFR BLD AUTO: 47.8 % (ref 34–46.6)
HDLC SERPL-MCNC: 68 MG/DL (ref 40–60)
HGB BLD-MCNC: 16.2 G/DL (ref 12–15.9)
HGB UR QL STRIP.AUTO: NEGATIVE
IMM GRANULOCYTES # BLD AUTO: 0.02 10*3/MM3 (ref 0–0.05)
IMM GRANULOCYTES NFR BLD AUTO: 0.2 % (ref 0–0.5)
KETONES UR QL STRIP: NEGATIVE
LDLC SERPL CALC-MCNC: 69 MG/DL (ref 0–100)
LDLC/HDLC SERPL: 1.02 {RATIO}
LEUKOCYTE ESTERASE UR QL STRIP.AUTO: NEGATIVE
LYMPHOCYTES # BLD AUTO: 2.28 10*3/MM3 (ref 0.7–3.1)
LYMPHOCYTES NFR BLD AUTO: 24.2 % (ref 19.6–45.3)
MCH RBC QN AUTO: 31.5 PG (ref 26.6–33)
MCHC RBC AUTO-ENTMCNC: 33.9 G/DL (ref 31.5–35.7)
MCV RBC AUTO: 92.8 FL (ref 79–97)
MONOCYTES # BLD AUTO: 0.66 10*3/MM3 (ref 0.1–0.9)
MONOCYTES NFR BLD AUTO: 7 % (ref 5–12)
NEUTROPHILS # BLD AUTO: 6.37 10*3/MM3 (ref 1.7–7)
NEUTROPHILS NFR BLD AUTO: 67.6 % (ref 42.7–76)
NITRITE UR QL STRIP: NEGATIVE
NRBC BLD AUTO-RTO: 0 /100 WBC (ref 0–0.2)
PH UR STRIP.AUTO: 5.5 [PH] (ref 4.6–8)
PLATELET # BLD AUTO: 179 10*3/MM3 (ref 140–450)
PMV BLD AUTO: 12.8 FL (ref 6–12)
POTASSIUM BLD-SCNC: 4.9 MMOL/L (ref 3.5–5.2)
PROT SERPL-MCNC: 7.2 G/DL (ref 6–8.5)
PROT UR QL STRIP: NEGATIVE
RBC # BLD AUTO: 5.15 10*6/MM3 (ref 3.77–5.28)
RBC # UR: ABNORMAL /HPF
REF LAB TEST METHOD: ABNORMAL
SODIUM BLD-SCNC: 143 MMOL/L (ref 136–145)
SP GR UR STRIP: 1.01 (ref 1–1.03)
SQUAMOUS #/AREA URNS HPF: ABNORMAL /HPF
TRIGL SERPL-MCNC: 128 MG/DL (ref 0–150)
TSH SERPL DL<=0.05 MIU/L-ACNC: 1.61 UIU/ML (ref 0.27–4.2)
UROBILINOGEN UR QL STRIP: NORMAL
VLDLC SERPL-MCNC: 25.6 MG/DL (ref 5–40)
WBC NRBC COR # BLD: 9.43 10*3/MM3 (ref 3.4–10.8)
WBC UR QL AUTO: ABNORMAL /HPF

## 2020-06-23 PROCEDURE — 99214 OFFICE O/P EST MOD 30 MIN: CPT | Performed by: FAMILY MEDICINE

## 2020-06-23 PROCEDURE — 85025 COMPLETE CBC W/AUTO DIFF WBC: CPT | Performed by: FAMILY MEDICINE

## 2020-06-23 PROCEDURE — 80053 COMPREHEN METABOLIC PANEL: CPT | Performed by: FAMILY MEDICINE

## 2020-06-23 PROCEDURE — 81001 URINALYSIS AUTO W/SCOPE: CPT | Performed by: FAMILY MEDICINE

## 2020-06-23 PROCEDURE — 84443 ASSAY THYROID STIM HORMONE: CPT | Performed by: FAMILY MEDICINE

## 2020-06-23 PROCEDURE — 71046 X-RAY EXAM CHEST 2 VIEWS: CPT | Performed by: FAMILY MEDICINE

## 2020-06-23 PROCEDURE — 80061 LIPID PANEL: CPT | Performed by: FAMILY MEDICINE

## 2020-06-23 NOTE — PROGRESS NOTES
SUBJECTIVE:  The patient is a 65-year-old white female.  She presents today because of intermittent left lower posterior rib cage pain over the last 2 months.  It hurts more when she moves.  No history of injury.  She is left-hand dominant.  No anterior chest pain shortness of breath vomiting or diarrhea.  No fever chills.  No rash.  No  symptoms.  She is a smoker.  She has hyperlipidemia.  She has coronary artery disease and peripheral vascular disease.  She feels fatigued and sometimes her hands feel tingly.    PAST MEDICAL HISTORY:  Reviewed.    REVIEW OF SYSTEMS:  Please see above; all others reviewed and are negative.      OBJECTIVE:   Vitals signs are reviewed and are stable.    General:  Well-nourished.  Alert and oriented x3 in no acute distress.  HEENT: PERRLA.   Neck:  Supple.   Lungs:  Clear.  Equal bilateral expansion  Heart:  Regular rate and rhythm.   Chest: Tenderness is present on the left posterior lower rib cage.  No crepitus or deformity.  No rash.  Abdomen:   Soft, nontender.   Extremities:  No cyanosis, clubbing or edema.   Neurological:  Grossly intact without motor or sensory deficits.   2 view chest x-ray is done here and interpreted by me.  None for comparison.  Indication left rib pain.  No acute abnormalities can be seen.    ASSESSMENT:    Left rib cage pain  Tobacco abuse  Coronary artery disease  Peripheral vascular disease  Hyperlipidemia  PLAN: CBC CMP fasting lipid TSH urinalysis chest x-ray ordered..  Follow-up on labs.  Discontinue smoking.  Call for problems.  Healthy lifestyle discussed.    Dictated utilizing Dragon dictation.

## 2021-04-03 ENCOUNTER — IMMUNIZATION (OUTPATIENT)
Dept: VACCINE CLINIC | Facility: HOSPITAL | Age: 66
End: 2021-04-03

## 2021-04-03 PROCEDURE — 91300 HC SARSCOV02 VAC 30MCG/0.3ML IM: CPT | Performed by: INTERNAL MEDICINE

## 2021-04-03 PROCEDURE — 0001A: CPT | Performed by: INTERNAL MEDICINE

## 2021-04-24 ENCOUNTER — IMMUNIZATION (OUTPATIENT)
Dept: VACCINE CLINIC | Facility: HOSPITAL | Age: 66
End: 2021-04-24

## 2021-04-24 PROCEDURE — 91300 HC SARSCOV02 VAC 30MCG/0.3ML IM: CPT | Performed by: INTERNAL MEDICINE

## 2021-04-24 PROCEDURE — 0002A: CPT | Performed by: INTERNAL MEDICINE

## 2021-05-13 ENCOUNTER — OFFICE VISIT (OUTPATIENT)
Dept: FAMILY MEDICINE CLINIC | Facility: CLINIC | Age: 66
End: 2021-05-13

## 2021-05-13 VITALS
WEIGHT: 122.8 LBS | BODY MASS INDEX: 19.27 KG/M2 | SYSTOLIC BLOOD PRESSURE: 142 MMHG | HEART RATE: 75 BPM | HEIGHT: 67 IN | TEMPERATURE: 97.1 F | DIASTOLIC BLOOD PRESSURE: 72 MMHG | OXYGEN SATURATION: 97 %

## 2021-05-13 DIAGNOSIS — Z12.11 COLON CANCER SCREENING: ICD-10-CM

## 2021-05-13 DIAGNOSIS — Z12.31 ENCOUNTER FOR SCREENING MAMMOGRAM FOR BREAST CANCER: ICD-10-CM

## 2021-05-13 DIAGNOSIS — Z78.0 POSTMENOPAUSAL: ICD-10-CM

## 2021-05-13 DIAGNOSIS — Z00.00 WELCOME TO MEDICARE PREVENTIVE VISIT: Primary | ICD-10-CM

## 2021-05-13 DIAGNOSIS — Z13.820 OSTEOPOROSIS SCREENING: ICD-10-CM

## 2021-05-13 DIAGNOSIS — R03.0 BLOOD PRESSURE ELEVATED WITHOUT HISTORY OF HTN: ICD-10-CM

## 2021-05-13 DIAGNOSIS — E78.2 MIXED HYPERLIPIDEMIA: ICD-10-CM

## 2021-05-13 DIAGNOSIS — D69.6 THROMBOCYTOPENIA (HCC): Primary | ICD-10-CM

## 2021-05-13 DIAGNOSIS — Z72.0 TOBACCO ABUSE: ICD-10-CM

## 2021-05-13 DIAGNOSIS — Z11.59 ENCOUNTER FOR HEPATITIS C SCREENING TEST FOR LOW RISK PATIENT: ICD-10-CM

## 2021-05-13 LAB
ALBUMIN SERPL-MCNC: 4.8 G/DL (ref 3.5–5.2)
ALBUMIN/GLOB SERPL: 2.1 G/DL
ALP SERPL-CCNC: 77 U/L (ref 39–117)
ALT SERPL W P-5'-P-CCNC: 25 U/L (ref 1–33)
ANION GAP SERPL CALCULATED.3IONS-SCNC: 10.2 MMOL/L (ref 5–15)
AST SERPL-CCNC: 29 U/L (ref 1–32)
BACTERIA UR QL AUTO: NORMAL /HPF
BILIRUB SERPL-MCNC: 0.4 MG/DL (ref 0–1.2)
BILIRUB UR QL STRIP: NEGATIVE
BUN SERPL-MCNC: 8 MG/DL (ref 8–23)
BUN/CREAT SERPL: 11.6 (ref 7–25)
CALCIUM SPEC-SCNC: 9.6 MG/DL (ref 8.6–10.5)
CHLORIDE SERPL-SCNC: 105 MMOL/L (ref 98–107)
CHOLEST SERPL-MCNC: 181 MG/DL (ref 0–200)
CLARITY UR: CLEAR
CO2 SERPL-SCNC: 27.8 MMOL/L (ref 22–29)
COLOR UR: YELLOW
CREAT SERPL-MCNC: 0.69 MG/DL (ref 0.57–1)
ERYTHROCYTE [DISTWIDTH] IN BLOOD BY AUTOMATED COUNT: 14 % (ref 12.3–15.4)
GFR SERPL CREATININE-BSD FRML MDRD: 85 ML/MIN/1.73
GLOBULIN UR ELPH-MCNC: 2.3 GM/DL
GLUCOSE SERPL-MCNC: 106 MG/DL (ref 65–99)
GLUCOSE UR STRIP-MCNC: NEGATIVE MG/DL
HCT VFR BLD AUTO: 45.8 % (ref 34–46.6)
HCV AB SER DONR QL: NORMAL
HDLC SERPL-MCNC: 97 MG/DL (ref 40–60)
HGB BLD-MCNC: 14.3 G/DL (ref 12–15.9)
HGB UR QL STRIP.AUTO: NEGATIVE
KETONES UR QL STRIP: NEGATIVE
LDLC SERPL CALC-MCNC: 70 MG/DL (ref 0–100)
LDLC/HDLC SERPL: 0.71 {RATIO}
LEUKOCYTE ESTERASE UR QL STRIP.AUTO: NEGATIVE
LYMPHOCYTES # BLD AUTO: 2.4 10*3/MM3 (ref 0.7–3.1)
LYMPHOCYTES NFR BLD AUTO: 26.7 % (ref 19.6–45.3)
MCH RBC QN AUTO: 30.8 PG (ref 26.6–33)
MCHC RBC AUTO-ENTMCNC: 31.1 G/DL (ref 31.5–35.7)
MCV RBC AUTO: 98.9 FL (ref 79–97)
MONOCYTES # BLD AUTO: 0.4 10*3/MM3 (ref 0.1–0.9)
MONOCYTES NFR BLD AUTO: 4 % (ref 5–12)
NEUTROPHILS NFR BLD AUTO: 6.3 10*3/MM3 (ref 1.7–7)
NEUTROPHILS NFR BLD AUTO: 69.3 % (ref 42.7–76)
NITRITE UR QL STRIP: NEGATIVE
PH UR STRIP.AUTO: 6 [PH] (ref 4.6–8)
PLATELET # BLD AUTO: 108 10*3/MM3 (ref 140–450)
PMV BLD AUTO: 9 FL (ref 6–12)
POTASSIUM SERPL-SCNC: 5.1 MMOL/L (ref 3.5–5.2)
PROT SERPL-MCNC: 7.1 G/DL (ref 6–8.5)
PROT UR QL STRIP: NEGATIVE
RBC # BLD AUTO: 4.63 10*6/MM3 (ref 3.77–5.28)
RBC # UR: NORMAL /HPF
REF LAB TEST METHOD: NORMAL
SODIUM SERPL-SCNC: 143 MMOL/L (ref 136–145)
SP GR UR STRIP: 1.02 (ref 1–1.03)
SQUAMOUS #/AREA URNS HPF: NORMAL /HPF
TRIGL SERPL-MCNC: 78 MG/DL (ref 0–150)
TSH SERPL DL<=0.05 MIU/L-ACNC: 2.31 UIU/ML (ref 0.27–4.2)
UROBILINOGEN UR QL STRIP: NORMAL
VLDLC SERPL-MCNC: 14 MG/DL (ref 5–40)
WBC # BLD AUTO: 9.1 10*3/MM3 (ref 3.4–10.8)
WBC UR QL AUTO: NORMAL /HPF

## 2021-05-13 PROCEDURE — 1170F FXNL STATUS ASSESSED: CPT | Performed by: NURSE PRACTITIONER

## 2021-05-13 PROCEDURE — 36415 COLL VENOUS BLD VENIPUNCTURE: CPT | Performed by: NURSE PRACTITIONER

## 2021-05-13 PROCEDURE — 84443 ASSAY THYROID STIM HORMONE: CPT | Performed by: NURSE PRACTITIONER

## 2021-05-13 PROCEDURE — G0402 INITIAL PREVENTIVE EXAM: HCPCS | Performed by: NURSE PRACTITIONER

## 2021-05-13 PROCEDURE — 86803 HEPATITIS C AB TEST: CPT | Performed by: NURSE PRACTITIONER

## 2021-05-13 PROCEDURE — 85025 COMPLETE CBC W/AUTO DIFF WBC: CPT | Performed by: NURSE PRACTITIONER

## 2021-05-13 PROCEDURE — 1159F MED LIST DOCD IN RCRD: CPT | Performed by: NURSE PRACTITIONER

## 2021-05-13 PROCEDURE — 80053 COMPREHEN METABOLIC PANEL: CPT | Performed by: NURSE PRACTITIONER

## 2021-05-13 PROCEDURE — 81001 URINALYSIS AUTO W/SCOPE: CPT | Performed by: NURSE PRACTITIONER

## 2021-05-13 PROCEDURE — 80061 LIPID PANEL: CPT | Performed by: NURSE PRACTITIONER

## 2021-05-13 RX ORDER — ATORVASTATIN CALCIUM 20 MG/1
20 TABLET, FILM COATED ORAL NIGHTLY
Qty: 90 TABLET | Refills: 3 | Status: SHIPPED | OUTPATIENT
Start: 2021-05-13 | End: 2022-09-13 | Stop reason: SDUPTHER

## 2021-05-13 NOTE — PROGRESS NOTES
"Chief Complaint  Medicare Wellness-Initial Visit    Subjective          Arline Zaragoza presents to [facility]  History of Present Illness  Here to FU on chronic chol on lipitor 20 mg fasting for recheck today smoker 1 ppd > 40 years has tried NRT and medication without success, SE chantix mood, rash with patches, last labs 06/20 well controlled, S/p partial hyst for cervical ca found during last pregnancy approx 33 years ago and was told don't need to return after a period of monitoring, no pelvic pain or abd bloating, no breast pain, lumps, nipple dc,, last mammo many years ago, Last cologuard 03/18 normal, no fam hx of colon ca, no bowel sx, last dexa many years ago was prev told osteopenia but changed diet and exercise and FU normal, on MVI, vit D, ASA, last vision and dental > 1 year d/t pandemic but plans to schedule     Objective   Vital Signs:   /72 (BP Location: Left arm, Patient Position: Sitting, Cuff Size: Adult)   Pulse 75   Temp 97.1 °F (36.2 °C) (Infrared)   Ht 170.2 cm (67\")   Wt 55.7 kg (122 lb 12.8 oz)   SpO2 97%   BMI 19.23 kg/m²     Physical Exam  Vitals reviewed.   Constitutional:       Appearance: She is well-developed.   HENT:      Head: Normocephalic and atraumatic.      Right Ear: Hearing and tympanic membrane normal.      Left Ear: Hearing and tympanic membrane normal.   Eyes:      Conjunctiva/sclera: Conjunctivae normal.      Pupils: Pupils are equal, round, and reactive to light.   Cardiovascular:      Rate and Rhythm: Normal rate and regular rhythm.      Pulses: Normal pulses.      Heart sounds: Normal heart sounds.   Pulmonary:      Effort: Pulmonary effort is normal.      Breath sounds: Normal breath sounds.   Abdominal:      General: There is no distension.      Palpations: Abdomen is soft. There is no mass.      Tenderness: There is no abdominal tenderness. There is no right CVA tenderness, left CVA tenderness, guarding or rebound.      " Hernia: No hernia is present.   Musculoskeletal:         General: Normal range of motion.      Cervical back: Normal range of motion.      Right lower leg: No edema.      Left lower leg: No edema.   Skin:     General: Skin is warm and dry.   Neurological:      Mental Status: She is alert and oriented to person, place, and time.   Psychiatric:         Mood and Affect: Mood normal.         Behavior: Behavior normal.         Thought Content: Thought content normal.         Judgment: Judgment normal.        Result Review :                 Assessment and Plan    Diagnoses and all orders for this visit:    1. Welcome to Medicare preventive visit (Primary)    2. Mixed hyperlipidemia  -     CBC & Differential  -     Comprehensive Metabolic Panel  -     Lipid Panel  -     TSH  -     Urinalysis With Microscopic - Urine, Clean Catch    3. Encounter for hepatitis C screening test for low risk patient  -     Hepatitis C Antibody    4. Encounter for screening mammogram for breast cancer  -     Mammo Screening Bilateral With CAD; Future    5. Colon cancer screening  -     Cologuard - Stool, Per Rectum; Future    6. Postmenopausal  -     DEXA Bone Density Axial; Future    7. Osteoporosis screening  -     DEXA Bone Density Axial; Future    8. Tobacco abuse  -     CBC & Differential  -     Comprehensive Metabolic Panel  -     Lipid Panel  -     TSH  -     Urinalysis With Microscopic - Urine, Clean Catch    9. Blood pressure elevated without history of HTN    Other orders  -     atorvastatin (LIPITOR) 20 MG tablet; Take 1 tablet by mouth Every Night.  Dispense: 90 tablet; Refill: 3        Follow Up   No follow-ups on file.  Patient was given instructions and counseling regarding her condition or for health maintenance advice. Please see specific information pulled into the AVS if appropriate.   Medicare wellness today, check labs and call with results, check BP at home pt to get cuff and gave DASH diet, encourage smoking cessation, if  BP remains > 140/90 RTC for HTN eval and medication, refill lipitor to pharmacy, order mammo, dexa and cologuard, defer WH annual CBE pap or pelvic

## 2021-05-13 NOTE — PATIENT INSTRUCTIONS
"Medicare wellness today, check labs and call with results, check BP at home pt to get cuff and gave DASH diet, encourage smoking cessation, if BP remains > 140/90 RTC for HTN eval and medication, refill lipitor to pharmacy, order mammo, dexa and cologuard, defer WH annual CBE pap or pelvic  https://www.nhlbi.nih.gov/files/docs/public/heart/dash_brief.pdf\">   DASH Eating Plan  DASH stands for Dietary Approaches to Stop Hypertension. The DASH eating plan is a healthy eating plan that has been shown to:  · Reduce high blood pressure (hypertension).  · Reduce your risk for type 2 diabetes, heart disease, and stroke.  · Help with weight loss.  What are tips for following this plan?  Reading food labels  · Check food labels for the amount of salt (sodium) per serving. Choose foods with less than 5 percent of the Daily Value of sodium. Generally, foods with less than 300 milligrams (mg) of sodium per serving fit into this eating plan.  · To find whole grains, look for the word \"whole\" as the first word in the ingredient list.  Shopping  · Buy products labeled as \"low-sodium\" or \"no salt added.\"  · Buy fresh foods. Avoid canned foods and pre-made or frozen meals.  Cooking  · Avoid adding salt when cooking. Use salt-free seasonings or herbs instead of table salt or sea salt. Check with your health care provider or pharmacist before using salt substitutes.  · Do not rao foods. Cook foods using healthy methods such as baking, boiling, grilling, roasting, and broiling instead.  · Cook with heart-healthy oils, such as olive, canola, avocado, soybean, or sunflower oil.  Meal planning    · Eat a balanced diet that includes:  ? 4 or more servings of fruits and 4 or more servings of vegetables each day. Try to fill one-half of your plate with fruits and vegetables.  ? 6-8 servings of whole grains each day.  ? Less than 6 oz (170 g) of lean meat, poultry, or fish each day. A 3-oz (85-g) serving of meat is about the same size as a " deck of cards. One egg equals 1 oz (28 g).  ? 2-3 servings of low-fat dairy each day. One serving is 1 cup (237 mL).  ? 1 serving of nuts, seeds, or beans 5 times each week.  ? 2-3 servings of heart-healthy fats. Healthy fats called omega-3 fatty acids are found in foods such as walnuts, flaxseeds, fortified milks, and eggs. These fats are also found in cold-water fish, such as sardines, salmon, and mackerel.  · Limit how much you eat of:  ? Canned or prepackaged foods.  ? Food that is high in trans fat, such as some fried foods.  ? Food that is high in saturated fat, such as fatty meat.  ? Desserts and other sweets, sugary drinks, and other foods with added sugar.  ? Full-fat dairy products.  · Do not salt foods before eating.  · Do not eat more than 4 egg yolks a week.  · Try to eat at least 2 vegetarian meals a week.  · Eat more home-cooked food and less restaurant, buffet, and fast food.  Lifestyle  · When eating at a restaurant, ask that your food be prepared with less salt or no salt, if possible.  · If you drink alcohol:  ? Limit how much you use to:  § 0-1 drink a day for women who are not pregnant.  § 0-2 drinks a day for men.  ? Be aware of how much alcohol is in your drink. In the [place] one drink equals one 12 oz bottle of beer (355 mL), one 5 oz glass of wine (148 mL), or one 1½ oz glass of hard liquor (44 mL).  General information  · Avoid eating more than 2,300 mg of salt a day. If you have hypertension, you may need to reduce your sodium intake to 1,500 mg a day.  · Work with your health care provider to maintain a healthy body weight or to lose weight. Ask what an ideal weight is for you.  · Get at least 30 minutes of exercise that causes your heart to beat faster (aerobic exercise) most days of the week. Activities may include walking, swimming, or biking.  · Work with your health care provider or dietitian to adjust your eating plan to your individual calorie needs.  What foods should I  eat?  Fruits  All fresh, dried, or frozen fruit. Canned fruit in natural juice (without added sugar).  Vegetables  Fresh or frozen vegetables (raw, steamed, roasted, or grilled). Low-sodium or reduced-sodium tomato and vegetable juice. Low-sodium or reduced-sodium tomato sauce and tomato paste. Low-sodium or reduced-sodium canned vegetables.  Grains  Whole-grain or whole-wheat bread. Whole-grain or whole-wheat pasta. Brown rice. Oatmeal. Quinoa. Bulgur. Whole-grain and low-sodium cereals. Mag bread. Low-fat, low-sodium crackers. Whole-wheat flour tortillas.  Meats and other proteins  Skinless chicken or turkey. Ground chicken or turkey. Pork with fat trimmed off. Fish and seafood. Egg whites. Dried beans, peas, or lentils. Unsalted nuts, nut butters, and seeds. Unsalted canned beans. Lean cuts of beef with fat trimmed off. Low-sodium, lean precooked or cured meat, such as sausages or meat loaves.  Dairy  Low-fat (1%) or fat-free (skim) milk. Reduced-fat, low-fat, or fat-free cheeses. Nonfat, low-sodium ricotta or cottage cheese. Low-fat or nonfat yogurt. Low-fat, low-sodium cheese.  Fats and oils  Soft margarine without trans fats. Vegetable oil. Reduced-fat, low-fat, or light mayonnaise and salad dressings (reduced-sodium). Canola, safflower, olive, avocado, soybean, and sunflower oils. Avocado.  Seasonings and condiments  Herbs. Spices. Seasoning mixes without salt.  Other foods  Unsalted popcorn and pretzels. Fat-free sweets.  The items listed above may not be a complete list of foods and beverages you can eat. Contact a dietitian for more information.  What foods should I avoid?  Fruits  Canned fruit in a light or heavy syrup. Fried fruit. Fruit in cream or butter sauce.  Vegetables  Creamed or fried vegetables. Vegetables in a cheese sauce. Regular canned vegetables (not low-sodium or reduced-sodium). Regular canned tomato sauce and paste (not low-sodium or reduced-sodium). Regular tomato and vegetable juice  (not low-sodium or reduced-sodium). Pickles. Olives.  Grains  Baked goods made with fat, such as croissants, muffins, or some breads. Dry pasta or rice meal packs.  Meats and other proteins  Fatty cuts of meat. Ribs. Fried meat. Gaviria. Bologna, salami, and other precooked or cured meats, such as sausages or meat loaves. Fat from the back of a pig (fatback). Bratwurst. Salted nuts and seeds. Canned beans with added salt. Canned or smoked fish. Whole eggs or egg yolks. Chicken or turkey with skin.  Dairy  Whole or 2% milk, cream, and half-and-half. Whole or full-fat cream cheese. Whole-fat or sweetened yogurt. Full-fat cheese. Nondairy creamers. Whipped toppings. Processed cheese and cheese spreads.  Fats and oils  Butter. Stick margarine. Lard. Shortening. Ghee. Gaviria fat. Tropical oils, such as coconut, palm kernel, or palm oil.  Seasonings and condiments  Onion salt, garlic salt, seasoned salt, table salt, and sea salt. Worcestershire sauce. Tartar sauce. Barbecue sauce. Teriyaki sauce. Soy sauce, including reduced-sodium. Steak sauce. Canned and packaged gravies. Fish sauce. Oyster sauce. Cocktail sauce. Store-bought horseradish. Ketchup. Mustard. Meat flavorings and tenderizers. Bouillon cubes. Hot sauces. Pre-made or packaged marinades. Pre-made or packaged taco seasonings. Relishes. Regular salad dressings.  Other foods  Salted popcorn and pretzels.  The items listed above may not be a complete list of foods and beverages you should avoid. Contact a dietitian for more information.  Where to find more information  · National Heart, Lung, and Blood [place]: www.nhlbi.nih.gov  · American Heart Association: www.heart.org  · Academy of Nutrition and Dietetics: www.eatright.org  · National Kidney Foundation: www.kidney.org  Summary  · The DASH eating plan is a healthy eating plan that has been shown to reduce high blood pressure (hypertension). It may also reduce your risk for type 2 diabetes, heart disease, and  stroke.  · When on the DASH eating plan, aim to eat more fresh fruits and vegetables, whole grains, lean proteins, low-fat dairy, and heart-healthy fats.  · With the DASH eating plan, you should limit salt (sodium) intake to 2,300 mg a day. If you have hypertension, you may need to reduce your sodium intake to 1,500 mg a day.  · Work with your health care provider or dietitian to adjust your eating plan to your individual calorie needs.  This information is not intended to replace advice given to you by your health care provider. Make sure you discuss any questions you have with your health care provider.  Document Revised: 11/20/2020 Document Reviewed: 11/20/2020  Elsevier Patient Education © 2021 [employer]

## 2021-05-13 NOTE — PROGRESS NOTES
The ABCs of the Annual Wellness Visit  [place] to Medicare Visit    Chief Complaint   Patient presents with   • Medicare Wellness-Initial Visit     Subjective   History of Present Illness:  Arline Zaragoza is a 66 y.o. female who presents for a  Welcome to Medicare Visit.    HEALTH RISK ASSESSMENT    Recent Hospitalizations:  No hospitalization(s) within the last year.    Current Medical Providers:  Patient Care Team:  Ryder Freed MD as PCP - General (Family Medicine)    Smoking Status:  Social History     Tobacco Use   Smoking Status Current Every Day Smoker   • Packs/day: 1.00   • Years: 40.00   • Pack years: 40.00   • Types: Cigarettes, Electronic Cigarette   Smokeless Tobacco Never Used     Alcohol Consumption:  Social History     Substance and Sexual Activity   Alcohol Use Yes   • Alcohol/week: 1.0 - 2.0 standard drinks   • Types: 1 - 2 Cans of beer per week     Depression Screen:   PHQ-2/PHQ-9 Depression Screening 5/13/2021   Little interest or pleasure in doing things 0   Feeling down, depressed, or hopeless 0   Trouble falling or staying asleep, or sleeping too much 0   Feeling tired or having little energy 0   Poor appetite or overeating 0   Feeling bad about yourself - or that you are a failure or have let yourself or your family down 0   Trouble concentrating on things, such as reading the newspaper or watching television 0   Moving or speaking so slowly that other people could have noticed. Or the opposite - being so fidgety or restless that you have been moving around a lot more than usual 0   Thoughts that you would be better off dead, or of hurting yourself in some way 0   Total Score 0   If you checked off any problems, how difficult have these problems made it for you to do your work, take care of things at home, or get along with other people? Not difficult at all     Fall Risk Screen:  PAULOADI Fall Risk Assessment was completed, and patient is at LOW risk for falls.Assessment completed  on:5/13/2021    Health Habits and Functional and Cognitive Screening:  Functional & Cognitive Status 5/13/2021   Do you have difficulty preparing food and eating? No   Do you have difficulty bathing yourself, getting dressed or grooming yourself? No   Do you have difficulty using the toilet? No   Do you have difficulty moving around from place to place? No   Do you have trouble with steps or getting out of a bed or a chair? No   Current Diet Well Balanced Diet   Dental Exam Not up to date   Eye Exam Not up to date   Exercise (times per week) 4 times per week   Current Exercises Include Walking   Do you need help using the phone?  No   Are you deaf or do you have serious difficulty hearing?  No   Do you need help with transportation? No   Do you need help shopping? No   Do you need help preparing meals?  No   Do you need help with housework?  No   Do you need help with laundry? No   Do you need help taking your medications? No   Do you need help managing money? No   Do you ever drive or ride in a car without wearing a seat belt? No   Have you felt unusual stress, anger or loneliness in the last month? No   Who do you live with? Alone   If you need help, do you have trouble finding someone available to you? No   Have you been bothered in the last four weeks by sexual problems? No   Do you have difficulty concentrating, remembering or making decisions? No     Does the patient have evidence of cognitive impairment? No    Asprin use counseling:Taking ASA appropriately as indicated    Visual Acuity: pending eye exam    No exam data present    Age-appropriate Screening Schedule:  Refer to the list below for future screening recommendations based on patient's age, sex and/or medical conditions. Orders for these recommended tests are listed in the plan section. The patient has been provided with a written plan.    Health Maintenance   Topic Date Due   • DXA SCAN  Never done   • ZOSTER VACCINE (1 of 2) Never done   • PAP  "SMEAR  Never done   • MAMMOGRAM  Never done   • LIPID PANEL  06/23/2021   • INFLUENZA VACCINE  08/01/2021   • TDAP/TD VACCINES (2 - Td) 05/01/2029        The following portions of the patient's history were reviewed and updated as appropriate: allergies, current medications, past family history, past medical history, past social history, past surgical history and problem list.    Outpatient Medications Prior to Visit   Medication Sig Dispense Refill   • aspirin 81 MG EC tablet Take 81 mg by mouth Daily.     • atorvastatin (LIPITOR) 20 MG tablet Take 1 tablet by mouth Every Night. 90 tablet 3     No facility-administered medications prior to visit.       Patient Active Problem List   Diagnosis   • Hyperlipidemia   • Cancer (CMS/HCC)   • CAD (coronary artery disease)   • Left carotid stenosis   • Closed displaced fracture of fifth metatarsal bone of right foot   • Tobacco abuse   • Sprain of right ankle   • Closed nondisplaced fracture of anterior process of left calcaneus   • Pain in left foot       Advanced Care Planning:  ACP discussion was held with the patient during this visit. Patient has an advance directive (not in EMR), copy requested.    Review of Systems    Compared to one year ago, the patient feels her physical health is worse. D/t pandemic less activity  Compared to one year ago, the patient feels her mental health is the same.    Reviewed chart for potential of high risk medication in the elderly: yes  Reviewed chart for potential of harmful drug interactions in the elderly:yes    Objective         Vitals:    05/13/21 0830   BP: 142/72   BP Location: Left arm   Patient Position: Sitting   Cuff Size: Adult   Pulse: 75   Temp: 97.1 °F (36.2 °C)   TempSrc: Infrared   SpO2: 97%   Weight: 55.7 kg (122 lb 12.8 oz)   Height: 170.2 cm (67\")       Body mass index is 19.23 kg/m².  Discussed the patient's BMI with her. The BMI is in the acceptable range.    Physical Exam          Procedures    Assessment/Plan "   Medicare Risks and Personalized Health Plan  CMS Preventative Services Quick Reference  Advance Directive Discussion  Breast Cancer/Mammogram Screening  Cardiovascular risk  Colon Cancer Screening  Glaucoma Risk  Osteoporosis Risk  Tobacco Use/Dependance (use dotphrase .tobaccocessation for documentation)    The above risks/problems have been discussed with the patient.  Pertinent information has been shared with the patient in the After Visit Summary.  Follow up plans and orders are seen below in the Assessment/Plan Section.    Diagnoses and all orders for this visit:    1. Welcome to Medicare preventive visit (Primary)    2. Mixed hyperlipidemia  -     CBC & Differential  -     Comprehensive Metabolic Panel  -     Lipid Panel  -     TSH  -     Urinalysis With Microscopic - Urine, Clean Catch    3. Encounter for hepatitis C screening test for low risk patient  -     Hepatitis C Antibody    4. Encounter for screening mammogram for breast cancer  -     Mammo Screening Bilateral With CAD; Future    5. Colon cancer screening  -     Cologuard - Stool, Per Rectum; Future    6. Postmenopausal  -     DEXA Bone Density Axial; Future    7. Osteoporosis screening  -     DEXA Bone Density Axial; Future    8. Tobacco abuse  -     CBC & Differential  -     Comprehensive Metabolic Panel  -     Lipid Panel  -     TSH  -     Urinalysis With Microscopic - Urine, Clean Catch    9. Blood pressure elevated without history of HTN    Other orders  -     atorvastatin (LIPITOR) 20 MG tablet; Take 1 tablet by mouth Every Night.  Dispense: 90 tablet; Refill: 3        Follow Up:  No follow-ups on file.     An After Visit Summary and PPPS were given to the patient.

## 2021-06-01 DIAGNOSIS — R19.5 POSITIVE COLORECTAL CANCER SCREENING USING COLOGUARD TEST: Primary | ICD-10-CM

## 2021-06-03 ENCOUNTER — TELEPHONE (OUTPATIENT)
Dept: GASTROENTEROLOGY | Facility: CLINIC | Age: 66
End: 2021-06-03

## 2021-06-14 ENCOUNTER — TELEPHONE (OUTPATIENT)
Dept: GASTROENTEROLOGY | Facility: CLINIC | Age: 66
End: 2021-06-14

## 2021-06-14 NOTE — TELEPHONE ENCOUNTER
Screening colonoscopy-- no personal or family hx of polyps or colon ca-- ASA-- medications:    aspirin 81 MG EC tablet  atorvastatin (LIPITOR) 20 MG tablet    OA form scanned into media

## 2021-06-18 DIAGNOSIS — R19.5 POSITIVE COLORECTAL CANCER SCREENING USING COLOGUARD TEST: Primary | ICD-10-CM

## 2021-06-18 RX ORDER — SODIUM CHLORIDE, SODIUM LACTATE, POTASSIUM CHLORIDE, CALCIUM CHLORIDE 600; 310; 30; 20 MG/100ML; MG/100ML; MG/100ML; MG/100ML
30 INJECTION, SOLUTION INTRAVENOUS CONTINUOUS
Status: CANCELLED | OUTPATIENT
Start: 2021-08-20

## 2021-06-23 PROBLEM — R19.5 POSITIVE COLORECTAL CANCER SCREENING USING COLOGUARD TEST: Status: ACTIVE | Noted: 2021-06-23

## 2021-08-19 RX ORDER — MULTIVIT-MIN/IRON/FOLIC ACID/K 18-600-40
2000 CAPSULE ORAL DAILY
COMMUNITY
End: 2022-10-08

## 2021-08-20 ENCOUNTER — ANESTHESIA (OUTPATIENT)
Dept: GASTROENTEROLOGY | Facility: HOSPITAL | Age: 66
End: 2021-08-20

## 2021-08-20 ENCOUNTER — HOSPITAL ENCOUNTER (OUTPATIENT)
Facility: HOSPITAL | Age: 66
Setting detail: HOSPITAL OUTPATIENT SURGERY
Discharge: HOME | End: 2021-08-20
Attending: INTERNAL MEDICINE | Admitting: INTERNAL MEDICINE

## 2021-08-20 ENCOUNTER — ANESTHESIA EVENT (OUTPATIENT)
Dept: GASTROENTEROLOGY | Facility: HOSPITAL | Age: 66
End: 2021-08-20

## 2021-08-20 VITALS
SYSTOLIC BLOOD PRESSURE: 150 MMHG | BODY MASS INDEX: 20.24 KG/M2 | HEART RATE: 74 BPM | RESPIRATION RATE: 16 BRPM | HEIGHT: 65 IN | DIASTOLIC BLOOD PRESSURE: 77 MMHG | WEIGHT: 121.5 LBS | OXYGEN SATURATION: 98 %

## 2021-08-20 DIAGNOSIS — R19.5 POSITIVE COLORECTAL CANCER SCREENING USING COLOGUARD TEST: ICD-10-CM

## 2021-08-20 PROCEDURE — S0260 H&P FOR SURGERY: HCPCS | Performed by: INTERNAL MEDICINE

## 2021-08-20 PROCEDURE — 45380 COLONOSCOPY AND BIOPSY: CPT | Performed by: INTERNAL MEDICINE

## 2021-08-20 PROCEDURE — 25010000002 PROPOFOL 10 MG/ML EMULSION: Performed by: NURSE ANESTHETIST, CERTIFIED REGISTERED

## 2021-08-20 PROCEDURE — 88305 TISSUE EXAM BY PATHOLOGIST: CPT | Performed by: INTERNAL MEDICINE

## 2021-08-20 RX ORDER — PROPOFOL 10 MG/ML
VIAL (ML) INTRAVENOUS CONTINUOUS PRN
Status: DISCONTINUED | OUTPATIENT
Start: 2021-08-20 | End: 2021-08-20 | Stop reason: SURG

## 2021-08-20 RX ORDER — SODIUM CHLORIDE, SODIUM LACTATE, POTASSIUM CHLORIDE, CALCIUM CHLORIDE 600; 310; 30; 20 MG/100ML; MG/100ML; MG/100ML; MG/100ML
30 INJECTION, SOLUTION INTRAVENOUS CONTINUOUS
Status: DISCONTINUED | OUTPATIENT
Start: 2021-08-20 | End: 2021-08-20 | Stop reason: HOSPADM

## 2021-08-20 RX ORDER — LIDOCAINE HYDROCHLORIDE 20 MG/ML
INJECTION, SOLUTION INFILTRATION; PERINEURAL AS NEEDED
Status: DISCONTINUED | OUTPATIENT
Start: 2021-08-20 | End: 2021-08-20 | Stop reason: SURG

## 2021-08-20 RX ORDER — SODIUM CHLORIDE, SODIUM LACTATE, POTASSIUM CHLORIDE, CALCIUM CHLORIDE 600; 310; 30; 20 MG/100ML; MG/100ML; MG/100ML; MG/100ML
1000 INJECTION, SOLUTION INTRAVENOUS CONTINUOUS
Status: DISCONTINUED | OUTPATIENT
Start: 2021-08-20 | End: 2021-08-20 | Stop reason: HOSPADM

## 2021-08-20 RX ORDER — PROPOFOL 10 MG/ML
VIAL (ML) INTRAVENOUS AS NEEDED
Status: DISCONTINUED | OUTPATIENT
Start: 2021-08-20 | End: 2021-08-20 | Stop reason: SURG

## 2021-08-20 RX ORDER — SODIUM CHLORIDE 0.9 % (FLUSH) 0.9 %
10 SYRINGE (ML) INJECTION AS NEEDED
Status: DISCONTINUED | OUTPATIENT
Start: 2021-08-20 | End: 2021-08-20 | Stop reason: HOSPADM

## 2021-08-20 RX ADMIN — PROPOFOL 200 MCG/KG/MIN: 10 INJECTION, EMULSION INTRAVENOUS at 11:30

## 2021-08-20 RX ADMIN — SODIUM CHLORIDE, POTASSIUM CHLORIDE, SODIUM LACTATE AND CALCIUM CHLORIDE 1000 ML: 600; 310; 30; 20 INJECTION, SOLUTION INTRAVENOUS at 10:55

## 2021-08-20 RX ADMIN — LIDOCAINE HYDROCHLORIDE 60 MG: 20 INJECTION, SOLUTION INFILTRATION; PERINEURAL at 11:30

## 2021-08-20 RX ADMIN — Medication 50 MG: at 11:31

## 2021-08-20 NOTE — ANESTHESIA POSTPROCEDURE EVALUATION
"Patient: Arline Zaragoza    Procedure Summary     Date: 08/20/21 Room / Location: [facility] ENDOSCOPY 5 / [facility] ENDOSCOPY    Anesthesia Start: 1127 Anesthesia Stop: 1203    Procedure: COLONOSCOPY INTO CECUM AND TI WITH COLD BX POLYPECTOMIES (N/A ) Diagnosis:       Positive colorectal cancer screening using Cologuard test      (Positive colorectal cancer screening using Cologuard test [R19.5])    Surgeons: Catia Kate MD Provider: Sharmaine Richardson MD    Anesthesia Type: MAC ASA Status: 3          Anesthesia Type: MAC    Vitals  Vitals Value Taken Time   /81 08/20/21 1217   Temp     Pulse 78 08/20/21 1217   Resp 16 08/20/21 1217   SpO2 98 % 08/20/21 1217           Post Anesthesia Care and Evaluation    Patient location during evaluation: PHASE II  Patient participation: complete - patient participated  Level of consciousness: awake and alert  Pain management: adequate  Airway patency: patent  Anesthetic complications: No anesthetic complications    Cardiovascular status: acceptable  Respiratory status: acceptable  Hydration status: acceptable    Comments: /81 (BP Location: Left arm, Patient Position: Lying)   Pulse 78   Resp 16   Ht 165.1 cm (65\")   Wt 55.1 kg (121 lb 8 oz)   SpO2 98%   BMI 20.22 kg/m²         "

## 2021-08-20 NOTE — H&P
[facility]  Pre Procedure History & Physical    Chief Complaint: Positive Cologuard test      HPI: 66-year-old woman with a past medical history as below here for screening colonoscopy.  She did have a positive Cologuard test.  She otherwise feels well.    Past Medical History:   Past Medical History:   Diagnosis Date   • Back pain     2005 had ruptured disc no surgery so prn c/o pain   • Cancer (CMS/HCC)     cervical   • Carotid artery stenosis    • Fibromuscular dysplasia (CMS/HCC)     dx 2 yrs ago   • Hyperlipidemia    • Loose stools    • Positive colorectal cancer screening using Cologuard test    • Psoriasis        Family History:  Family History   Problem Relation Age of Onset   • Vision loss Mother    • Malig Hyperthermia Neg Hx        Social History:   reports that she has been smoking cigarettes. She has a 40.00 pack-year smoking history. She has never used smokeless tobacco. She reports current alcohol use of about 2.0 standard drinks of alcohol per week. She reports that she does not use drugs.    Medications:   No medications prior to admission.       Allergies:  Patient has no known allergies.    ROS:    Pertinent items are noted in HPI     Objective     There were no vitals taken for this visit.    Physical Exam   Constitutional: Pt is oriented to person, place, and time and well-developed, well-nourished, and in no distress.   HENT:   Mouth/Throat: Oropharynx is clear and moist.   Neck: Normal range of motion. Neck supple.   Cardiovascular: Normal rate, regular rhythm and normal heart sounds.    Pulmonary/Chest: Effort normal and breath sounds normal. No respiratory distress. No  wheezes.   Abdominal: Soft. Bowel sounds are normal.   Skin: Skin is warm and dry.   Psychiatric: Mood, memory, affect and judgment normal.     Assessment/Plan     Diagnosis: Positive Cologuard test      Anticipated Surgical Procedure:    Colonoscopy    The risks, benefits, and alternatives of this  procedure have been discussed with the patient or the responsible party- the patient understands and agrees to proceed.

## 2021-08-20 NOTE — DISCHARGE INSTRUCTIONS
For the next 24 hours patient needs to be with a responsible adult.    For 24 hours DO NOT drive, operate machinery, appliances, drink alcohol, make important decisions or sign legal documents.    Start with a light or bland diet and advance to regular diet as tolerated.    Follow recommendations on procedure report provided by your doctor.    Call Dr Kate for problems [phone]    Problems may include but not limited to: large amounts of bleeding, trouble breathing, repeated vomiting, severe unrelieved pain, fever or chills.

## 2021-08-20 NOTE — ANESTHESIA PREPROCEDURE EVALUATION
Anesthesia Evaluation     no history of anesthetic complications:               Airway   Mallampati: II  TM distance: >3 FB  Neck ROM: full  Dental    (+) poor dentition    Comment: Loose lower front tooth    Pulmonary    (+) a smoker Current,   (-) shortness of breath, recent URI  Cardiovascular     (+) CAD, dysrhythmias Tachycardia, hyperlipidemia,  carotid artery disease  (-) angina    ROS comment: Non specific changes, stress neg for ischemia    Neuro/Psych  GI/Hepatic/Renal/Endo    (-) liver disease, no renal disease, diabetes    Musculoskeletal     (+) back pain,   Abdominal    Substance History      OB/GYN          Other      history of cancer (cervical)                    Anesthesia Plan    ASA 3     MAC     intravenous induction     Anesthetic plan, all risks, benefits, and alternatives have been provided, discussed and informed consent has been obtained with: patient.

## 2021-08-23 LAB
CYTO UR: NORMAL
LAB AP CASE REPORT: NORMAL
PATH REPORT.FINAL DX SPEC: NORMAL
PATH REPORT.GROSS SPEC: NORMAL

## 2021-08-26 NOTE — PROGRESS NOTES
Her colon polyps were benign hyperplastic polyps.Please place in 10-year colonoscopy recall, thanks.

## 2021-08-27 ENCOUNTER — TELEPHONE (OUTPATIENT)
Dept: GASTROENTEROLOGY | Facility: CLINIC | Age: 66
End: 2021-08-27

## 2021-08-27 NOTE — TELEPHONE ENCOUNTER
----- Message from Catia Kate MD sent at 8/26/2021  2:16 PM EDT -----  Her colon polyps were benign hyperplastic polyps.Please place in 10-year colonoscopy recall, thanks.

## 2021-09-14 ENCOUNTER — OFFICE VISIT (OUTPATIENT)
Dept: FAMILY MEDICINE CLINIC | Facility: CLINIC | Age: 66
End: 2021-09-14

## 2021-09-14 VITALS
HEART RATE: 99 BPM | HEIGHT: 65 IN | DIASTOLIC BLOOD PRESSURE: 72 MMHG | BODY MASS INDEX: 19.93 KG/M2 | TEMPERATURE: 97.1 F | WEIGHT: 119.6 LBS | SYSTOLIC BLOOD PRESSURE: 128 MMHG | OXYGEN SATURATION: 97 %

## 2021-09-14 DIAGNOSIS — B02.9 HERPES ZOSTER WITHOUT COMPLICATION: Primary | ICD-10-CM

## 2021-09-14 PROCEDURE — 99213 OFFICE O/P EST LOW 20 MIN: CPT | Performed by: NURSE PRACTITIONER

## 2021-09-14 RX ORDER — VALACYCLOVIR HYDROCHLORIDE 1 G/1
1000 TABLET, FILM COATED ORAL 3 TIMES DAILY
Qty: 18 TABLET | Refills: 0 | Status: SHIPPED | OUTPATIENT
Start: 2021-09-14 | End: 2021-09-20

## 2021-09-14 RX ORDER — VALACYCLOVIR HYDROCHLORIDE 1 G/1
1000 TABLET, FILM COATED ORAL
COMMUNITY
Start: 2021-09-12 | End: 2021-09-14

## 2021-09-14 RX ORDER — LIDOCAINE 40 MG/G
CREAM TOPICAL
COMMUNITY
Start: 2021-09-12 | End: 2021-09-17 | Stop reason: SDUPTHER

## 2021-09-14 NOTE — PATIENT INSTRUCTIONS
Increase valtrex to tid, send in refill for total of 10 days.   Tylenol as needed for pain, she can try lidocaine cream if needed.   If symptoms persist call office   Patient agrees with plan of care and understands instructions. Call if worsening symptoms or any problems or concerns.

## 2021-09-14 NOTE — PROGRESS NOTES
"Chief Complaint  f/u shingles,    Subjective          Arlineluis Zaragoza presents to [facility]  History of Present Illness  Here today for f/u, she went to [facility] 9/12/2021 for upper back pain, dx with shingles, given valtrex 1 G bid for 7 days. Also given lidocaine cream. States she did not have a rash at that time, she now noticed rash today. She started valtrex yesterday. States pain has improved some. States trouble sleeping d/t pain. States she has not yet been able to pick rx.         Objective   Vital Signs:   /72   Pulse 99   Temp 97.1 °F (36.2 °C)   Ht 165.1 cm (65\")   Wt 54.3 kg (119 lb 9.6 oz)   SpO2 97%   BMI 19.90 kg/m²     Physical Exam  Vitals and nursing note reviewed.   Constitutional:       Appearance: She is well-developed.   HENT:      Head: Normocephalic.   Eyes:      Pupils: Pupils are equal, round, and reactive to light.   Cardiovascular:      Rate and Rhythm: Normal rate and regular rhythm.      Heart sounds: Normal heart sounds.   Pulmonary:      Effort: Pulmonary effort is normal.      Breath sounds: Normal breath sounds.   Skin:     General: Skin is warm and dry.      Findings: Rash present. Rash is vesicular.          Neurological:      Mental Status: She is alert and oriented to person, place, and time.   Psychiatric:         Behavior: Behavior normal.         Judgment: Judgment normal.        Result Review :{Labs  Result Review  Imaging  Med Tab  Media  Procedures :23}                 Assessment and Plan    Diagnoses and all orders for this visit:    1. Herpes zoster without complication (Primary)    Other orders  -     valACYclovir (Valtrex) 1000 MG tablet; Take 1 tablet by mouth 3 (Three) Times a Day for 6 days.  Dispense: 18 tablet; Refill: 0        Follow Up   Return if symptoms worsen or fail to improve.  Patient was given instructions and counseling regarding her condition or for health maintenance advice. Please see specific " information pulled into the AVS if appropriate.     Increase valtrex to tid, send in refill for total of 10 days.   Tylenol as needed for pain, she can try lidocaine cream if needed.   If symptoms persist call office   Patient agrees with plan of care and understands instructions. Call if worsening symptoms or any problems or concerns.

## 2021-09-17 RX ORDER — LIDOCAINE 40 MG/G
CREAM TOPICAL 2 TIMES DAILY
Qty: 30 G | Refills: 2 | Status: SHIPPED | OUTPATIENT
Start: 2021-09-17 | End: 2021-10-17

## 2021-09-17 NOTE — TELEPHONE ENCOUNTER
PATIENT STATES: THAT HER PHARMACY DONT CARRY lidocaine (LMX) 4 % cream  SO SHE WOULD LIKE TO CHANGE THE LOCATION PLEASE ADVISE     PATIENT CAN BE REACHED ON: [phone]    [facility] [address] AT [facility] & ([place] [address] - [phone]  - [phone]   [phone]

## 2021-09-24 ENCOUNTER — APPOINTMENT (OUTPATIENT)
Dept: MAMMOGRAPHY | Facility: HOSPITAL | Age: 66
End: 2021-09-24

## 2021-09-24 ENCOUNTER — APPOINTMENT (OUTPATIENT)
Dept: BONE DENSITY | Facility: HOSPITAL | Age: 66
End: 2021-09-24

## 2021-11-06 ENCOUNTER — APPOINTMENT (OUTPATIENT)
Dept: MAMMOGRAPHY | Facility: HOSPITAL | Age: 66
End: 2021-11-06

## 2022-01-17 ENCOUNTER — HOSPITAL ENCOUNTER (OUTPATIENT)
Dept: BONE DENSITY | Facility: HOSPITAL | Age: 67
Discharge: HOME | End: 2022-01-17

## 2022-01-17 ENCOUNTER — HOSPITAL ENCOUNTER (OUTPATIENT)
Dept: MAMMOGRAPHY | Facility: HOSPITAL | Age: 67
Discharge: HOME | End: 2022-01-17

## 2022-01-17 DIAGNOSIS — Z12.31 ENCOUNTER FOR SCREENING MAMMOGRAM FOR BREAST CANCER: ICD-10-CM

## 2022-01-17 DIAGNOSIS — Z13.820 OSTEOPOROSIS SCREENING: ICD-10-CM

## 2022-01-17 DIAGNOSIS — Z78.0 POSTMENOPAUSAL: ICD-10-CM

## 2022-01-17 PROCEDURE — 77080 DXA BONE DENSITY AXIAL: CPT

## 2022-01-17 PROCEDURE — 77067 SCR MAMMO BI INCL CAD: CPT

## 2022-01-17 PROCEDURE — 77063 BREAST TOMOSYNTHESIS BI: CPT

## 2022-01-20 DIAGNOSIS — M81.0 OSTEOPOROSIS, UNSPECIFIED OSTEOPOROSIS TYPE, UNSPECIFIED PATHOLOGICAL FRACTURE PRESENCE: ICD-10-CM

## 2022-01-20 DIAGNOSIS — R92.8 ABNORMAL MAMMOGRAM: Primary | ICD-10-CM

## 2022-01-27 DIAGNOSIS — Z09 FOLLOW UP: Primary | ICD-10-CM

## 2022-02-02 ENCOUNTER — APPOINTMENT (OUTPATIENT)
Dept: ONCOLOGY | Facility: HOSPITAL | Age: 67
End: 2022-02-02

## 2022-02-15 ENCOUNTER — HOSPITAL ENCOUNTER (OUTPATIENT)
Dept: MAMMOGRAPHY | Facility: HOSPITAL | Age: 67
Discharge: HOME | End: 2022-02-15

## 2022-02-15 ENCOUNTER — HOSPITAL ENCOUNTER (OUTPATIENT)
Dept: ULTRASOUND IMAGING | Facility: HOSPITAL | Age: 67
Discharge: HOME | End: 2022-02-15

## 2022-02-15 DIAGNOSIS — R92.8 ABNORMAL MAMMOGRAM: ICD-10-CM

## 2022-02-15 DIAGNOSIS — N63.10 BREAST MASS, RIGHT: Primary | ICD-10-CM

## 2022-02-15 PROCEDURE — 77065 DX MAMMO INCL CAD UNI: CPT

## 2022-02-15 PROCEDURE — 76642 ULTRASOUND BREAST LIMITED: CPT

## 2022-02-15 PROCEDURE — G0279 TOMOSYNTHESIS, MAMMO: HCPCS

## 2022-02-17 ENCOUNTER — PREP FOR SURGERY (OUTPATIENT)
Dept: OTHER | Facility: HOSPITAL | Age: 67
End: 2022-02-17

## 2022-02-17 DIAGNOSIS — R92.8 ABNORMAL MAMMOGRAM: Primary | ICD-10-CM

## 2022-02-18 ENCOUNTER — TELEPHONE (OUTPATIENT)
Dept: SURGERY | Facility: CLINIC | Age: 67
End: 2022-02-18

## 2022-02-18 NOTE — TELEPHONE ENCOUNTER
Biopsy is scheduled on 3/9/2022 @ 2:30pm, patient arrival 1:30pm.    Routing referral to Dr. Bah.    Called and left message with patient about appointment time, patient to call back and confirm.

## 2022-03-09 ENCOUNTER — HOSPITAL ENCOUNTER (OUTPATIENT)
Dept: ULTRASOUND IMAGING | Facility: HOSPITAL | Age: 67
Discharge: HOME | End: 2022-03-09

## 2022-03-09 ENCOUNTER — HOSPITAL ENCOUNTER (OUTPATIENT)
Dept: MAMMOGRAPHY | Facility: HOSPITAL | Age: 67
Discharge: HOME | End: 2022-03-09

## 2022-03-09 VITALS
WEIGHT: 125 LBS | OXYGEN SATURATION: 99 % | HEIGHT: 65 IN | DIASTOLIC BLOOD PRESSURE: 70 MMHG | TEMPERATURE: 98 F | HEART RATE: 68 BPM | SYSTOLIC BLOOD PRESSURE: 176 MMHG | RESPIRATION RATE: 16 BRPM | BODY MASS INDEX: 20.83 KG/M2

## 2022-03-09 DIAGNOSIS — R92.8 ABNORMAL MAMMOGRAM: ICD-10-CM

## 2022-03-09 PROCEDURE — A4648 IMPLANTABLE TISSUE MARKER: HCPCS

## 2022-03-09 PROCEDURE — 88341 IMHCHEM/IMCYTCHM EA ADD ANTB: CPT | Performed by: SURGERY

## 2022-03-09 PROCEDURE — 88342 IMHCHEM/IMCYTCHM 1ST ANTB: CPT | Performed by: SURGERY

## 2022-03-09 PROCEDURE — 77065 DX MAMMO INCL CAD UNI: CPT

## 2022-03-09 PROCEDURE — 88360 TUMOR IMMUNOHISTOCHEM/MANUAL: CPT | Performed by: SURGERY

## 2022-03-09 PROCEDURE — 88305 TISSUE EXAM BY PATHOLOGIST: CPT | Performed by: SURGERY

## 2022-03-09 PROCEDURE — 0 LIDOCAINE 1 % SOLUTION: Performed by: RADIOLOGY

## 2022-03-09 RX ORDER — LIDOCAINE HYDROCHLORIDE AND EPINEPHRINE 10; 10 MG/ML; UG/ML
10 INJECTION, SOLUTION INFILTRATION; PERINEURAL ONCE
Status: COMPLETED | OUTPATIENT
Start: 2022-03-09 | End: 2022-03-09

## 2022-03-09 RX ORDER — LIDOCAINE HYDROCHLORIDE 10 MG/ML
10 INJECTION, SOLUTION INFILTRATION; PERINEURAL ONCE
Status: COMPLETED | OUTPATIENT
Start: 2022-03-09 | End: 2022-03-09

## 2022-03-09 RX ADMIN — LIDOCAINE HYDROCHLORIDE 9 ML: 10 INJECTION, SOLUTION INFILTRATION; PERINEURAL at 15:12

## 2022-03-09 RX ADMIN — LIDOCAINE HYDROCHLORIDE,EPINEPHRINE BITARTRATE 8 ML: 10; .01 INJECTION, SOLUTION INFILTRATION; PERINEURAL at 15:12

## 2022-03-09 NOTE — H&P
Name: Arline Zaragoza ADMIT: 3/9/2022   : 1955  PCP: Ryder Freed MD    MRN: [id number] LOS: 0 days   AGE/SEX: 67 y.o. female  ROOM: Room/bed info not found       Chief complaint right breast lesion    Present Illness or Internal History:  Patient is a 67 y.o. female presents with a right breast lesion.     Past Surgical History:  Past Surgical History:   Procedure Laterality Date   • COLONOSCOPY     • COLONOSCOPY N/A 2021    Procedure: COLONOSCOPY INTO CECUM AND TI WITH COLD BX POLYPECTOMIES;  Surgeon: Catia Ktae MD;  Location: [facility] ENDOSCOPY;  Service: Gastroenterology;  Laterality: N/A;  PRE: POSITIVE COLOGUARD  POST: DIVERTICULOSIS, POLYPS, HEMORRHOIDS   • DC THROMBOENDARTECTMY NECK,NECK INCIS Left 2016    Procedure: LT CAROTID ENDARTERECTOMY WITH INTRA OPERATIVE CAROTID DUPLEX SCAN;  Surgeon: Arnulfo Hernandez MD;  Location: [facility] MAIN OR;  Service: Vascular   • SUBTOTAL HYSTERECTOMY         Past Medical History:  Past Medical History:   Diagnosis Date   • Back pain      had ruptured disc no surgery so prn c/o pain   • Cancer (HCC)     cervical   • Carotid artery stenosis    • Fibromuscular dysplasia (HCC)     dx 2 yrs ago   • Hyperlipidemia    • Loose stools    • Positive colorectal cancer screening using Cologuard test    • Psoriasis        Home Medications:  (Not in a hospital admission)      Allergies:  Patient has no known allergies.    Family History:  Family History   Problem Relation Age of Onset   • Vision loss Mother    • Malig Hyperthermia Neg Hx    • Breast cancer Neg Hx    • Ovarian cancer Neg Hx        Social History:  Social History     Tobacco Use   • Smoking status: Current Every Day Smoker     Packs/day: 1.00     Years: 40.00     Pack years: 40.00     Types: Cigarettes   • Smokeless tobacco: Never Used   Vaping Use   • Vaping Use: Never used   Substance Use Topics   • Alcohol use: Yes     Alcohol/week: 2.0 standard drinks     Types: 2 Glasses of wine  per week   • Drug use: No        Objective     Physical Exam:    No exam performed today,    Vital Signs  Temp:  [98 °F (36.7 °C)] 98 °F (36.7 °C)  Heart Rate:  [78] 78  Resp:  [18] 18  BP: (171)/(76) 171/76    Anticipated Surgical Procedure:  Ultrasound guided right breast biopsy with clip placement    The risks, benefits and alternatives of this procedure have been discussed with the patient or responsible party: Yes        Julián Palacios Jr., MD  03/09/22  14:26 EST

## 2022-03-09 NOTE — NURSING NOTE
"Biopsy site to right lower, outer breast clear with Exofin dry and intact. No firmness or swelling noted at or around biopsy site. Denies pain. Patient did report \"it felt like the needle hit a rib\". Dr. Palacios aware and area of patient's concern (upper inner chest) was assessed with ultrasound at end of procedure. Noted a small area of mild swelling at same location. O2 sats 99% and 100% on room air following procedure. Patient denies any shortness of breath or pain at site. Reports slight tenderness only. Dr. Palacios reassessed area following post biopsy mammogram. O2 Sats remain at 100% on room air. Ice pack with protective covering applied to biopsy site. Discharge instructions discussed with understanding voiced by patient. Copies provided to patient. No distress noted. To home via private vehicle.  "

## 2022-03-11 LAB
LAB AP CASE REPORT: NORMAL
LAB AP CLINICAL INFORMATION: NORMAL
LAB AP SPECIAL STAINS: NORMAL
LAB AP SYNOPTIC CHECKLIST: NORMAL
PATH REPORT.FINAL DX SPEC: NORMAL
PATH REPORT.GROSS SPEC: NORMAL

## 2022-03-15 ENCOUNTER — OFFICE VISIT (OUTPATIENT)
Dept: SURGERY | Facility: CLINIC | Age: 67
End: 2022-03-15

## 2022-03-15 ENCOUNTER — NURSE NAVIGATOR (OUTPATIENT)
Dept: OTHER | Facility: HOSPITAL | Age: 67
End: 2022-03-15

## 2022-03-15 VITALS — BODY MASS INDEX: 19.44 KG/M2 | HEIGHT: 66 IN | WEIGHT: 121 LBS

## 2022-03-15 DIAGNOSIS — C50.911 MALIGNANT NEOPLASM OF RIGHT BREAST IN FEMALE, ESTROGEN RECEPTOR POSITIVE, UNSPECIFIED SITE OF BREAST: Primary | ICD-10-CM

## 2022-03-15 DIAGNOSIS — Z17.0 MALIGNANT NEOPLASM OF RIGHT BREAST IN FEMALE, ESTROGEN RECEPTOR POSITIVE, UNSPECIFIED SITE OF BREAST: Primary | ICD-10-CM

## 2022-03-15 PROCEDURE — 99204 OFFICE O/P NEW MOD 45 MIN: CPT | Performed by: STUDENT IN AN ORGANIZED HEALTH CARE EDUCATION/TRAINING PROGRAM

## 2022-03-15 NOTE — PROGRESS NOTES
General Surgery Breast Cancer History and Physical Exam     Summary:    Arline Zaragoza is a 67 y.o. lady. The patient presents with a new diagnosis of right breast invasive ductal carcinoma: Grade 3,  ER +/OK +, Her2 -; cT1N0, Anatomic Stage 1A, Prognostic Stage 1A.      A multidisciplinary plan has been formulated for the patient:    (1) Breast Surgical Oncology:  -Follow up [employer] 9 panel genetic testing. I will call her with results.   -MRI to evaluate anatomy as well as for surgical planning.   -Nurse navigator consult.   -Surgical plan: Likely plan for R breast wire localized lumpectomy with SLN biopsy pending MRI results.   -Plastic surgery referral deferred at this time.     (2) Medical Oncology:  -Will refer postoperatively for evaluation for endocrine therapy and possible need for chemotherapy.    (3) Radiation Oncology:  -Will refer postoperatively for evaluation for radiation therapy.    Referring Provider: No ref. provider found    Chief Complaint: abnormal breast imaging    History of Present Illness: Ms. Arline Zaragoza is a 67 y.o. year old lady, seen at the request of No ref. provider found for a new diagnosis of right breast cancer.      This was initially detected as an imaging abnormality. She has not had annual mammograms each year and skipped many years. Her most recent prior mammogram was at least 10 years ago. She denies any prior history of abnormal mammograms or breast biopsies. Her work-up is detailed in the oncologic history below.     She denies any palpable breast lumps, pain, skin changes, or nipple discharge. She denies any family history of breast or ovarian cancer.     Workup of Current Diagnosis:    01/17/2022 - Bilateral Screening Mammogram:  Right breast  Impression: Architectural distortion.  BIRADS 0    02/15/2022 - Diagnostic Mammogram with Ultrasound:   Right breast - There is a persistent area of architecture distortion in the upper central posterior right breast on ultrasound.  At 11:30, 4 cm from the nipple, there is an incidental 0.7 cm benign appearing cluster cyst. At 12:00, 4 cm from the nipple, there is a vague regular hypoechoic mass area of shadowing measuring 0.7 x 0.6 x 0.6 cm in that region.  Impression: Suspicious, recommend stereotactic biopsy.  BIRADS 4    Right Breast Biopsy:   Read pending    Pathology:   Pathology: Right breast 12:00, 4 cm from the nipple invasive ductal carcinoma, grade 2, measures up to 6.5 mm, ER positive, MN positive, HER2 negative, Ki-67 3%.    Gynecologic History:   . P:2 AB:0  Age at first childbirth: 28 or 29  Lactation/How long: Did not breastfeed.  Age at menarche: 15  Age at menopause: 45  Total years of oral contraceptive use: 2  Total years of hormone replacement therapy: 0    Past Medical History:   Past Medical History:   Diagnosis Date   • Back pain      had ruptured disc no surgery so prn c/o pain   • Cancer (HCC)     cervical   • Carotid artery stenosis    • Fibromuscular dysplasia (HCC)     dx 2 yrs ago   • Hyperlipidemia    • Loose stools    • Positive colorectal cancer screening using Cologuard test    • Psoriasis        Past Surgical History:    Past Surgical History:   Procedure Laterality Date   • COLONOSCOPY     • COLONOSCOPY N/A 2021    Procedure: COLONOSCOPY INTO CECUM AND TI WITH COLD BX POLYPECTOMIES;  Surgeon: Catia Kate MD;  Location: [facility] ENDOSCOPY;  Service: Gastroenterology;  Laterality: N/A;  PRE: POSITIVE COLOGUARD  POST: DIVERTICULOSIS, POLYPS, HEMORRHOIDS   • MN THROMBOENDARTECTMY NECK,NECK INCIS Left 2016    Procedure: LT CAROTID ENDARTERECTOMY WITH INTRA OPERATIVE CAROTID DUPLEX SCAN;  Surgeon: Arnulfo Hernandez MD;  Location: [facility] MAIN OR;  Service: Vascular   • SUBTOTAL HYSTERECTOMY         Family History:    Family History   Problem Relation Age of Onset   • Vision loss Mother    • Malig Hyperthermia Neg Hx    • Breast cancer Neg Hx    • Ovarian cancer Neg Hx      Social  History:  • Smokes cigarettes, 1 pack per day  • Occasional alcohol use    Allergies:   No Known Allergies    Medications:     Current Outpatient Medications:   •  aspirin 81 MG EC tablet, Take 81 mg by mouth Daily., Disp: , Rfl:   •  atorvastatin (LIPITOR) 20 MG tablet, Take 1 tablet by mouth Every Night., Disp: 90 tablet, Rfl: 3  •  Cholecalciferol (Vitamin D) 50 MCG (2000 UT) capsule, Take 2,000 Units by mouth Daily., Disp: , Rfl:     Laboratory Values:    Labs from 2021 reviewed    Review of Systems:   Influenza-like illness: no fever, no  cough, no  sore throat, no  body aches, no loss of sense of taste or smell, no known exposure to person with Covid-19.  Constitutional: Negative for fevers or chills  HENT: Negative for hearing loss or runny nose  Eyes: Negative for vision changes or scleral icterus  Respiratory: Negative for cough or shortness of breath  Cardiovascular: Negative for chest pain or heart palpitations  Gastrointestinal: Negative for abdominal pain, nausea, vomiting, constipation, melena, or hematochezia  Genitourinary: Negative for hematuria or dysuria  Musculoskeletal: Negative for joint swelling or gait instability  Neurologic: Negative for tremors or seizures  Psychiatric: Negative for suicidal ideations or depression  All other systems reviewed and negative    Physical Exam:   • ECO - Asymptomatic  • Constitutional: Well-developed well-nourished, no acute distress  • Eyes: Conjunctiva normal, sclera nonicteric  • ENMT: Hearing grossly normal, oral mucosa moist  • Neck: Supple, no palpable mass, trachea midline  • Respiratory: Clear to auscultation, normal inspiratory effort  • Cardiovascular: Regular rate, no peripheral edema, no jugular venous distention  • Breast: symmetric,   o Right: No visible abnormalities on inspection while seated, with arms raised or hands on hips. No masses, skin changes, or nipple abnormalities.  o Left:  No visible abnormalities on inspection while seated,  with arms raised or hands on hips. No masses, skin changes, or nipple abnormalities.  o Biopsy site appreciated in right breast, otherwise no skin changes.   o No clinical chest wall involvement.  • Gastrointestinal: Soft, nontender  • Lymphatics (palpable nodes): No cervical, supraclavicular or axillary lymphadenopathy  • Skin:  Warm, dry, no rash on visualized skin surfaces  • Musculoskeletal: Symmetric strength, normal gait  • Psychiatric: Alert and oriented ×3, normal affect     Discussion:  I had an extensive discussion with the patient and her family about the nature of her breast cancer diagnosis. We reviewed the components of breast tissue including ducts and lobules. We reviewed her pathology report in detail. We reviewed breast cancer histology, including stage, grade, ER/AK receptors, HER2 receptors and how this applies to her diagnosis. We reviewed the basics of systemic and local/regional management of breast cancer.     We discussed that most breast cancer is not hereditary, that I do not believe this plays a role in her case, and that genetic testing is not warranted.     We reviewed potential surgical treatments to include partial mastectomy, mastectomy, sentinel lymph node biopsy and axillary node dissection and discussed the rationale associated with each approach. Regarding radiation therapy, we discussed that radiation is indicated in all cases of breast conservation and in only limited circumstances following mastectomy. We discussed that the primary goal of adjuvant radiation is to decrease the likelihood of local recurrence.     In her case, she is a good candidate for lumpectomy and she would like to proceed with breast conservation. We discussed that lumpectomy would require preoperative wire-localization. We also discussed the risk of positive margins and that she must have negative margins for lumpectomy to be an appropriate oncologic procedure. I will make every effort to obtain negative  margins at her initial operation, but there is a 10-15% chance that she will require a second operation for re-excision, or possibly a total mastectomy. We will not know the margin status until after her final pathology has returned.     We discussed axillary staging. I described the procedure for sentinel lymph node biopsy in detail, including the preoperative injection of technetium sulfur colloid and intraoperative injection of lymphazurin blue dye. I explained that this is a mapping test and not a cancer test, that all of the lymph nodes containing these dyes will be removed for complete testing by pathology, and that the results could impact the decision for adjuvant treatment or additional surgery.    I described additional risks and potential complications associated with surgery, including, but not limited to, bleeding, infection, complications related to blue dye, lymphedema, deformity/poor cosmetic result, chronic pain, neurovascular injury, numbness, seroma, hematoma, deep venous thrombosis, skin flap necrosis, disease recurrence and the possibility of requiring additional surgery. We also discussed other treatment options including the option of not undergoing any surgical treatment and the risks associated with this including disease progression. She expressed an understanding of these factors and wished to proceed.    We discussed that in her case, systemic treatment would involve endocrine therapy and possibly chemotherapy. She will be referred to medical oncology postoperatively to discuss this further.     ALLIE BRUSH M.D.  General and Endoscopic Surgery  [facility]    [address], [address]  [place], [place], [address]  P: [phone]  F: [phone]     Transcribed from ambient dictation for Allie Brush MD by Allie Brush MD.  03/14/22   22:04 EDT    Patient verbalized consent to the visit recording.  I have personally performed the services described in this document  as transcribed by the above individual, and it is both accurate and complete.  Allei Bah MD  3/17/2022  22:36 EDT   Transcribed from ambient dictation for Allie Bah MD by Linda Badillo.  03/16/22   11:22 EDT

## 2022-03-15 NOTE — PROGRESS NOTES
Referral received from Dr. Bah's office. Met Ms. Zaragoza during her surgery consult. I introduced myself and navigational services. She has a good understanding of her pathology and treatment options presented to her by Dr. Bah. After the consult she is leaning toward having a lumpectomy. She is comfortable with this plan and has no questions or concerns following the consult.     We discussed her support system and she stated she has family that have been very encouraging and helpful. She stated she has no resource needs at this time.     We discussed integrative therapies and other services at [facility]. I gave her a navigation folder with the following information: [facility] Cancer Support Network, Sharing Our Stories Breast Cancer Support Group, Cancer and Restorative Exercise (CARE), [facility] program, Together for Breast Cancer Survival, Guide for the Newly Diagnosed, Bio[employer], [facility], Massage Therapy, Reiki Therapy, [employer] [place], Cancer Nutrition, and Survivorship Clinic.    She verbalized appreciation for navigational services and she has my contact information and will call with any questions that arise.

## 2022-03-16 ENCOUNTER — HOSPITAL ENCOUNTER (OUTPATIENT)
Dept: MRI IMAGING | Facility: HOSPITAL | Age: 67
Discharge: HOME | End: 2022-03-16
Admitting: STUDENT IN AN ORGANIZED HEALTH CARE EDUCATION/TRAINING PROGRAM

## 2022-03-16 DIAGNOSIS — Z17.0 MALIGNANT NEOPLASM OF RIGHT BREAST IN FEMALE, ESTROGEN RECEPTOR POSITIVE, UNSPECIFIED SITE OF BREAST: ICD-10-CM

## 2022-03-16 DIAGNOSIS — C50.911 MALIGNANT NEOPLASM OF RIGHT BREAST IN FEMALE, ESTROGEN RECEPTOR POSITIVE, UNSPECIFIED SITE OF BREAST: ICD-10-CM

## 2022-03-16 PROCEDURE — C8908 MRI W/O FOL W/CONT, BREAST,: HCPCS

## 2022-03-16 PROCEDURE — 82565 ASSAY OF CREATININE: CPT

## 2022-03-16 PROCEDURE — C8937 CAD BREAST MRI: HCPCS

## 2022-03-16 PROCEDURE — 0 GADOBENATE DIMEGLUMINE 529 MG/ML SOLUTION: Performed by: STUDENT IN AN ORGANIZED HEALTH CARE EDUCATION/TRAINING PROGRAM

## 2022-03-16 PROCEDURE — A9577 INJ MULTIHANCE: HCPCS | Performed by: STUDENT IN AN ORGANIZED HEALTH CARE EDUCATION/TRAINING PROGRAM

## 2022-03-16 RX ADMIN — GADOBENATE DIMEGLUMINE 11 ML: 529 INJECTION, SOLUTION INTRAVENOUS at 20:22

## 2022-03-17 LAB — CREAT BLDA-MCNC: 0.7 MG/DL (ref 0.6–1.3)

## 2022-03-21 ENCOUNTER — TELEPHONE (OUTPATIENT)
Dept: SURGERY | Facility: CLINIC | Age: 67
End: 2022-03-21

## 2022-03-21 ENCOUNTER — PREP FOR SURGERY (OUTPATIENT)
Dept: OTHER | Facility: HOSPITAL | Age: 67
End: 2022-03-21

## 2022-03-21 DIAGNOSIS — C50.911 MALIGNANT NEOPLASM OF RIGHT BREAST IN FEMALE, ESTROGEN RECEPTOR POSITIVE, UNSPECIFIED SITE OF BREAST: Primary | ICD-10-CM

## 2022-03-21 DIAGNOSIS — Z17.0 MALIGNANT NEOPLASM OF RIGHT BREAST IN FEMALE, ESTROGEN RECEPTOR POSITIVE, UNSPECIFIED SITE OF BREAST: Primary | ICD-10-CM

## 2022-03-21 RX ORDER — DIAZEPAM 5 MG/1
5 TABLET ORAL ONCE
Status: CANCELLED | OUTPATIENT
Start: 2022-03-21 | End: 2022-03-21

## 2022-03-21 RX ORDER — LIDOCAINE AND PRILOCAINE 25; 25 MG/G; MG/G
CREAM TOPICAL ONCE
Status: CANCELLED | OUTPATIENT
Start: 2022-03-21 | End: 2022-03-21

## 2022-03-21 RX ORDER — CEFAZOLIN SODIUM 2 G/100ML
2 INJECTION, SOLUTION INTRAVENOUS ONCE
Status: CANCELLED | OUTPATIENT
Start: 2022-03-21 | End: 2022-03-21

## 2022-03-21 NOTE — TELEPHONE ENCOUNTER
Called Arline Zaragoza regarding MRI results    No new findings. Known area of malignancy seen. Remainder of both breasts and lymph node areas normal.     Plan to proceed with surgery as planned: right breast wire localized lumpectomy with SLN biop. Orders placed. Will schedule.     Allie Bah MD

## 2022-03-22 PROBLEM — C50.911 MALIGNANT NEOPLASM OF RIGHT BREAST IN FEMALE, ESTROGEN RECEPTOR POSITIVE: Status: ACTIVE | Noted: 2022-03-22

## 2022-03-22 PROBLEM — Z17.0 MALIGNANT NEOPLASM OF RIGHT BREAST IN FEMALE, ESTROGEN RECEPTOR POSITIVE: Status: ACTIVE | Noted: 2022-03-22

## 2022-03-31 ENCOUNTER — NURSE NAVIGATOR (OUTPATIENT)
Dept: OTHER | Facility: HOSPITAL | Age: 67
End: 2022-03-31

## 2022-03-31 NOTE — PROGRESS NOTES
Called Ms. Zaragoza to see how she was doing. She stated she is doing well but had a few questions about surgery. We discussed those and she was thankful for the information. She was thankful for the call and will reach out if any questions or needs arise.

## 2022-04-26 ENCOUNTER — NURSE NAVIGATOR (OUTPATIENT)
Dept: OTHER | Facility: HOSPITAL | Age: 67
End: 2022-04-26

## 2022-04-26 ENCOUNTER — PRE-ADMISSION TESTING (OUTPATIENT)
Dept: PREADMISSION TESTING | Facility: HOSPITAL | Age: 67
End: 2022-04-26

## 2022-04-26 VITALS
BODY MASS INDEX: 19.93 KG/M2 | HEART RATE: 80 BPM | TEMPERATURE: 99.3 F | SYSTOLIC BLOOD PRESSURE: 164 MMHG | HEIGHT: 66 IN | WEIGHT: 124 LBS | DIASTOLIC BLOOD PRESSURE: 71 MMHG | RESPIRATION RATE: 20 BRPM | OXYGEN SATURATION: 98 %

## 2022-04-26 DIAGNOSIS — Z17.0 MALIGNANT NEOPLASM OF RIGHT BREAST IN FEMALE, ESTROGEN RECEPTOR POSITIVE, UNSPECIFIED SITE OF BREAST: ICD-10-CM

## 2022-04-26 DIAGNOSIS — C50.911 MALIGNANT NEOPLASM OF RIGHT BREAST IN FEMALE, ESTROGEN RECEPTOR POSITIVE, UNSPECIFIED SITE OF BREAST: ICD-10-CM

## 2022-04-26 LAB
ANION GAP SERPL CALCULATED.3IONS-SCNC: 13.8 MMOL/L (ref 5–15)
BUN SERPL-MCNC: 11 MG/DL (ref 8–23)
BUN/CREAT SERPL: 16.9 (ref 7–25)
CALCIUM SPEC-SCNC: 9.6 MG/DL (ref 8.6–10.5)
CHLORIDE SERPL-SCNC: 103 MMOL/L (ref 98–107)
CO2 SERPL-SCNC: 22.2 MMOL/L (ref 22–29)
CREAT SERPL-MCNC: 0.65 MG/DL (ref 0.57–1)
DEPRECATED RDW RBC AUTO: 42.1 FL (ref 37–54)
EGFRCR SERPLBLD CKD-EPI 2021: 97.2 ML/MIN/1.73
ERYTHROCYTE [DISTWIDTH] IN BLOOD BY AUTOMATED COUNT: 13 % (ref 12.3–15.4)
GLUCOSE SERPL-MCNC: 104 MG/DL (ref 65–99)
HCT VFR BLD AUTO: 43.4 % (ref 34–46.6)
HGB BLD-MCNC: 14.8 G/DL (ref 12–15.9)
MCH RBC QN AUTO: 30.5 PG (ref 26.6–33)
MCHC RBC AUTO-ENTMCNC: 34.1 G/DL (ref 31.5–35.7)
MCV RBC AUTO: 89.3 FL (ref 79–97)
PLATELET # BLD AUTO: 163 10*3/MM3 (ref 140–450)
PMV BLD AUTO: 12.1 FL (ref 6–12)
POTASSIUM SERPL-SCNC: 3.9 MMOL/L (ref 3.5–5.2)
QT INTERVAL: 414 MS
RBC # BLD AUTO: 4.86 10*6/MM3 (ref 3.77–5.28)
SARS-COV-2 ORF1AB RESP QL NAA+PROBE: NOT DETECTED
SODIUM SERPL-SCNC: 139 MMOL/L (ref 136–145)
WBC NRBC COR # BLD: 8.36 10*3/MM3 (ref 3.4–10.8)

## 2022-04-26 PROCEDURE — 93010 ELECTROCARDIOGRAM REPORT: CPT | Performed by: INTERNAL MEDICINE

## 2022-04-26 PROCEDURE — 85027 COMPLETE CBC AUTOMATED: CPT

## 2022-04-26 PROCEDURE — 36415 COLL VENOUS BLD VENIPUNCTURE: CPT

## 2022-04-26 PROCEDURE — C9803 HOPD COVID-19 SPEC COLLECT: HCPCS

## 2022-04-26 PROCEDURE — 80048 BASIC METABOLIC PNL TOTAL CA: CPT

## 2022-04-26 PROCEDURE — U0004 COV-19 TEST NON-CDC HGH THRU: HCPCS

## 2022-04-26 PROCEDURE — 93005 ELECTROCARDIOGRAM TRACING: CPT

## 2022-04-26 RX ORDER — CHLORHEXIDINE GLUCONATE 500 MG/1
1 CLOTH TOPICAL
COMMUNITY
End: 2022-05-16

## 2022-04-26 NOTE — PROGRESS NOTES
MsRakesh Zaragoza called with questions about her medications. Message sent to Dr. Bah to review. Will update pt with response.

## 2022-04-26 NOTE — DISCHARGE INSTRUCTIONS

## 2022-04-28 ENCOUNTER — HOSPITAL ENCOUNTER (OUTPATIENT)
Facility: HOSPITAL | Age: 67
Setting detail: HOSPITAL OUTPATIENT SURGERY
Discharge: HOME | End: 2022-04-28
Attending: STUDENT IN AN ORGANIZED HEALTH CARE EDUCATION/TRAINING PROGRAM | Admitting: STUDENT IN AN ORGANIZED HEALTH CARE EDUCATION/TRAINING PROGRAM

## 2022-04-28 ENCOUNTER — HOSPITAL ENCOUNTER (OUTPATIENT)
Dept: NUCLEAR MEDICINE | Facility: HOSPITAL | Age: 67
Discharge: HOME | End: 2022-04-28

## 2022-04-28 ENCOUNTER — ANESTHESIA (OUTPATIENT)
Dept: PERIOP | Facility: HOSPITAL | Age: 67
End: 2022-04-28

## 2022-04-28 ENCOUNTER — HOSPITAL ENCOUNTER (OUTPATIENT)
Dept: MAMMOGRAPHY | Facility: HOSPITAL | Age: 67
Discharge: HOME | End: 2022-04-28

## 2022-04-28 ENCOUNTER — APPOINTMENT (OUTPATIENT)
Dept: GENERAL RADIOLOGY | Facility: HOSPITAL | Age: 67
End: 2022-04-28

## 2022-04-28 ENCOUNTER — ANESTHESIA EVENT (OUTPATIENT)
Dept: PERIOP | Facility: HOSPITAL | Age: 67
End: 2022-04-28

## 2022-04-28 VITALS
DIASTOLIC BLOOD PRESSURE: 73 MMHG | OXYGEN SATURATION: 94 % | TEMPERATURE: 97.8 F | SYSTOLIC BLOOD PRESSURE: 136 MMHG | RESPIRATION RATE: 16 BRPM | HEART RATE: 74 BPM

## 2022-04-28 DIAGNOSIS — C50.911 MALIGNANT NEOPLASM OF RIGHT BREAST IN FEMALE, ESTROGEN RECEPTOR POSITIVE, UNSPECIFIED SITE OF BREAST: ICD-10-CM

## 2022-04-28 DIAGNOSIS — Z17.0 MALIGNANT NEOPLASM OF RIGHT BREAST IN FEMALE, ESTROGEN RECEPTOR POSITIVE, UNSPECIFIED SITE OF BREAST: Primary | ICD-10-CM

## 2022-04-28 DIAGNOSIS — Z17.0 MALIGNANT NEOPLASM OF RIGHT BREAST IN FEMALE, ESTROGEN RECEPTOR POSITIVE, UNSPECIFIED SITE OF BREAST: ICD-10-CM

## 2022-04-28 DIAGNOSIS — C50.911 MALIGNANT NEOPLASM OF RIGHT BREAST IN FEMALE, ESTROGEN RECEPTOR POSITIVE, UNSPECIFIED SITE OF BREAST: Primary | ICD-10-CM

## 2022-04-28 PROCEDURE — 38900 IO MAP OF SENT LYMPH NODE: CPT | Performed by: STUDENT IN AN ORGANIZED HEALTH CARE EDUCATION/TRAINING PROGRAM

## 2022-04-28 PROCEDURE — 25010000002 CEFAZOLIN IN DEXTROSE 2-4 GM/100ML-% SOLUTION: Performed by: STUDENT IN AN ORGANIZED HEALTH CARE EDUCATION/TRAINING PROGRAM

## 2022-04-28 PROCEDURE — 25010000002 DEXAMETHASONE PER 1 MG: Performed by: NURSE ANESTHETIST, CERTIFIED REGISTERED

## 2022-04-28 PROCEDURE — A9520 TC99 TILMANOCEPT DIAG 0.5MCI: HCPCS | Performed by: STUDENT IN AN ORGANIZED HEALTH CARE EDUCATION/TRAINING PROGRAM

## 2022-04-28 PROCEDURE — 0 LIDOCAINE 1 % SOLUTION: Performed by: STUDENT IN AN ORGANIZED HEALTH CARE EDUCATION/TRAINING PROGRAM

## 2022-04-28 PROCEDURE — C1889 IMPLANT/INSERT DEVICE, NOC: HCPCS | Performed by: STUDENT IN AN ORGANIZED HEALTH CARE EDUCATION/TRAINING PROGRAM

## 2022-04-28 PROCEDURE — 25010000002 FENTANYL CITRATE (PF) 50 MCG/ML SOLUTION: Performed by: ANESTHESIOLOGY

## 2022-04-28 PROCEDURE — 76098 X-RAY EXAM SURGICAL SPECIMEN: CPT

## 2022-04-28 PROCEDURE — 25010000002 ONDANSETRON PER 1 MG: Performed by: NURSE ANESTHETIST, CERTIFIED REGISTERED

## 2022-04-28 PROCEDURE — 19301 PARTIAL MASTECTOMY: CPT | Performed by: SPECIALIST/TECHNOLOGIST, OTHER

## 2022-04-28 PROCEDURE — 25010000002 FENTANYL CITRATE (PF) 50 MCG/ML SOLUTION: Performed by: NURSE ANESTHETIST, CERTIFIED REGISTERED

## 2022-04-28 PROCEDURE — 19301 PARTIAL MASTECTOMY: CPT | Performed by: STUDENT IN AN ORGANIZED HEALTH CARE EDUCATION/TRAINING PROGRAM

## 2022-04-28 PROCEDURE — 25010000002 ONDANSETRON PER 1 MG: Performed by: ANESTHESIOLOGY

## 2022-04-28 PROCEDURE — C1819 TISSUE LOCALIZATION-EXCISION: HCPCS

## 2022-04-28 PROCEDURE — 38525 BIOPSY/REMOVAL LYMPH NODES: CPT | Performed by: STUDENT IN AN ORGANIZED HEALTH CARE EDUCATION/TRAINING PROGRAM

## 2022-04-28 PROCEDURE — 38792 RA TRACER ID OF SENTINL NODE: CPT

## 2022-04-28 PROCEDURE — 0 TECHETIUM TC99M TILMANOCEPT: Performed by: STUDENT IN AN ORGANIZED HEALTH CARE EDUCATION/TRAINING PROGRAM

## 2022-04-28 PROCEDURE — 25010000002 HYDROMORPHONE PER 4 MG: Performed by: NURSE ANESTHETIST, CERTIFIED REGISTERED

## 2022-04-28 PROCEDURE — 25010000002 PROPOFOL 10 MG/ML EMULSION: Performed by: NURSE ANESTHETIST, CERTIFIED REGISTERED

## 2022-04-28 PROCEDURE — 88307 TISSUE EXAM BY PATHOLOGIST: CPT | Performed by: STUDENT IN AN ORGANIZED HEALTH CARE EDUCATION/TRAINING PROGRAM

## 2022-04-28 DEVICE — LIGACLIP MCA MULTIPLE CLIP APPLIERS, 20 SMALL CLIPS
Type: IMPLANTABLE DEVICE | Site: BREAST | Status: FUNCTIONAL
Brand: LIGACLIP

## 2022-04-28 RX ORDER — FENTANYL CITRATE 50 UG/ML
INJECTION, SOLUTION INTRAMUSCULAR; INTRAVENOUS AS NEEDED
Status: DISCONTINUED | OUTPATIENT
Start: 2022-04-28 | End: 2022-04-28 | Stop reason: SURG

## 2022-04-28 RX ORDER — MIDAZOLAM HYDROCHLORIDE 1 MG/ML
0.5 INJECTION INTRAMUSCULAR; INTRAVENOUS
Status: DISCONTINUED | OUTPATIENT
Start: 2022-04-28 | End: 2022-04-28 | Stop reason: HOSPADM

## 2022-04-28 RX ORDER — FLUMAZENIL 0.1 MG/ML
0.2 INJECTION INTRAVENOUS AS NEEDED
Status: DISCONTINUED | OUTPATIENT
Start: 2022-04-28 | End: 2022-04-28 | Stop reason: HOSPADM

## 2022-04-28 RX ORDER — EPHEDRINE SULFATE 50 MG/ML
5 INJECTION, SOLUTION INTRAVENOUS ONCE AS NEEDED
Status: DISCONTINUED | OUTPATIENT
Start: 2022-04-28 | End: 2022-04-28 | Stop reason: HOSPADM

## 2022-04-28 RX ORDER — CEFAZOLIN SODIUM 2 G/100ML
2 INJECTION, SOLUTION INTRAVENOUS ONCE
Status: COMPLETED | OUTPATIENT
Start: 2022-04-28 | End: 2022-04-28

## 2022-04-28 RX ORDER — DIPHENHYDRAMINE HCL 25 MG
25 CAPSULE ORAL
Status: DISCONTINUED | OUTPATIENT
Start: 2022-04-28 | End: 2022-04-28 | Stop reason: HOSPADM

## 2022-04-28 RX ORDER — HYDRALAZINE HYDROCHLORIDE 20 MG/ML
5 INJECTION INTRAMUSCULAR; INTRAVENOUS
Status: DISCONTINUED | OUTPATIENT
Start: 2022-04-28 | End: 2022-04-28 | Stop reason: HOSPADM

## 2022-04-28 RX ORDER — LIDOCAINE HYDROCHLORIDE 20 MG/ML
INJECTION, SOLUTION INFILTRATION; PERINEURAL AS NEEDED
Status: DISCONTINUED | OUTPATIENT
Start: 2022-04-28 | End: 2022-04-28 | Stop reason: SURG

## 2022-04-28 RX ORDER — PROMETHAZINE HYDROCHLORIDE 25 MG/1
25 SUPPOSITORY RECTAL ONCE AS NEEDED
Status: DISCONTINUED | OUTPATIENT
Start: 2022-04-28 | End: 2022-04-28 | Stop reason: HOSPADM

## 2022-04-28 RX ORDER — NALOXONE HCL 0.4 MG/ML
0.2 VIAL (ML) INJECTION AS NEEDED
Status: DISCONTINUED | OUTPATIENT
Start: 2022-04-28 | End: 2022-04-28 | Stop reason: HOSPADM

## 2022-04-28 RX ORDER — MAGNESIUM HYDROXIDE 1200 MG/15ML
LIQUID ORAL AS NEEDED
Status: DISCONTINUED | OUTPATIENT
Start: 2022-04-28 | End: 2022-04-28 | Stop reason: HOSPADM

## 2022-04-28 RX ORDER — HYDROCODONE BITARTRATE AND ACETAMINOPHEN 7.5; 325 MG/1; MG/1
1 TABLET ORAL ONCE AS NEEDED
Status: COMPLETED | OUTPATIENT
Start: 2022-04-28 | End: 2022-04-28

## 2022-04-28 RX ORDER — IBUPROFEN 600 MG/1
600 TABLET ORAL ONCE AS NEEDED
Status: DISCONTINUED | OUTPATIENT
Start: 2022-04-28 | End: 2022-04-28 | Stop reason: HOSPADM

## 2022-04-28 RX ORDER — FAMOTIDINE 10 MG/ML
20 INJECTION, SOLUTION INTRAVENOUS ONCE
Status: COMPLETED | OUTPATIENT
Start: 2022-04-28 | End: 2022-04-28

## 2022-04-28 RX ORDER — SODIUM CHLORIDE, SODIUM LACTATE, POTASSIUM CHLORIDE, CALCIUM CHLORIDE 600; 310; 30; 20 MG/100ML; MG/100ML; MG/100ML; MG/100ML
9 INJECTION, SOLUTION INTRAVENOUS CONTINUOUS
Status: DISCONTINUED | OUTPATIENT
Start: 2022-04-28 | End: 2022-04-28 | Stop reason: HOSPADM

## 2022-04-28 RX ORDER — LABETALOL HYDROCHLORIDE 5 MG/ML
5 INJECTION, SOLUTION INTRAVENOUS
Status: DISCONTINUED | OUTPATIENT
Start: 2022-04-28 | End: 2022-04-28 | Stop reason: HOSPADM

## 2022-04-28 RX ORDER — FENTANYL CITRATE 50 UG/ML
50 INJECTION, SOLUTION INTRAMUSCULAR; INTRAVENOUS
Status: DISCONTINUED | OUTPATIENT
Start: 2022-04-28 | End: 2022-04-28 | Stop reason: HOSPADM

## 2022-04-28 RX ORDER — BUPIVACAINE HYDROCHLORIDE AND EPINEPHRINE 5; 5 MG/ML; UG/ML
INJECTION, SOLUTION EPIDURAL; INTRACAUDAL; PERINEURAL AS NEEDED
Status: DISCONTINUED | OUTPATIENT
Start: 2022-04-28 | End: 2022-04-28 | Stop reason: HOSPADM

## 2022-04-28 RX ORDER — SCOLOPAMINE TRANSDERMAL SYSTEM 1 MG/1
1 PATCH, EXTENDED RELEASE TRANSDERMAL ONCE
Status: DISCONTINUED | OUTPATIENT
Start: 2022-04-28 | End: 2022-04-28 | Stop reason: HOSPADM

## 2022-04-28 RX ORDER — EPHEDRINE SULFATE 50 MG/ML
INJECTION, SOLUTION INTRAVENOUS AS NEEDED
Status: DISCONTINUED | OUTPATIENT
Start: 2022-04-28 | End: 2022-04-28 | Stop reason: SURG

## 2022-04-28 RX ORDER — PROPOFOL 10 MG/ML
VIAL (ML) INTRAVENOUS AS NEEDED
Status: DISCONTINUED | OUTPATIENT
Start: 2022-04-28 | End: 2022-04-28 | Stop reason: SURG

## 2022-04-28 RX ORDER — OXYCODONE AND ACETAMINOPHEN 7.5; 325 MG/1; MG/1
1 TABLET ORAL EVERY 4 HOURS PRN
Status: DISCONTINUED | OUTPATIENT
Start: 2022-04-28 | End: 2022-04-28 | Stop reason: HOSPADM

## 2022-04-28 RX ORDER — DEXAMETHASONE SODIUM PHOSPHATE 10 MG/ML
INJECTION INTRAMUSCULAR; INTRAVENOUS AS NEEDED
Status: DISCONTINUED | OUTPATIENT
Start: 2022-04-28 | End: 2022-04-28 | Stop reason: SURG

## 2022-04-28 RX ORDER — LIDOCAINE AND PRILOCAINE 25; 25 MG/G; MG/G
CREAM TOPICAL ONCE
Status: COMPLETED | OUTPATIENT
Start: 2022-04-28 | End: 2022-04-28

## 2022-04-28 RX ORDER — DIAZEPAM 5 MG/1
5 TABLET ORAL ONCE
Status: COMPLETED | OUTPATIENT
Start: 2022-04-28 | End: 2022-04-28

## 2022-04-28 RX ORDER — ONDANSETRON 2 MG/ML
4 INJECTION INTRAMUSCULAR; INTRAVENOUS ONCE AS NEEDED
Status: COMPLETED | OUTPATIENT
Start: 2022-04-28 | End: 2022-04-28

## 2022-04-28 RX ORDER — SODIUM CHLORIDE 0.9 % (FLUSH) 0.9 %
3 SYRINGE (ML) INJECTION EVERY 12 HOURS SCHEDULED
Status: DISCONTINUED | OUTPATIENT
Start: 2022-04-28 | End: 2022-04-28 | Stop reason: HOSPADM

## 2022-04-28 RX ORDER — DIPHENHYDRAMINE HYDROCHLORIDE 50 MG/ML
12.5 INJECTION INTRAMUSCULAR; INTRAVENOUS
Status: DISCONTINUED | OUTPATIENT
Start: 2022-04-28 | End: 2022-04-28 | Stop reason: HOSPADM

## 2022-04-28 RX ORDER — LIDOCAINE HYDROCHLORIDE 10 MG/ML
3 INJECTION, SOLUTION INFILTRATION; PERINEURAL ONCE
Status: COMPLETED | OUTPATIENT
Start: 2022-04-28 | End: 2022-04-28

## 2022-04-28 RX ORDER — HYDROMORPHONE HYDROCHLORIDE 1 MG/ML
0.5 INJECTION, SOLUTION INTRAMUSCULAR; INTRAVENOUS; SUBCUTANEOUS
Status: DISCONTINUED | OUTPATIENT
Start: 2022-04-28 | End: 2022-04-28 | Stop reason: HOSPADM

## 2022-04-28 RX ORDER — HYDROMORPHONE HCL 110MG/55ML
PATIENT CONTROLLED ANALGESIA SYRINGE INTRAVENOUS AS NEEDED
Status: DISCONTINUED | OUTPATIENT
Start: 2022-04-28 | End: 2022-04-28 | Stop reason: SURG

## 2022-04-28 RX ORDER — PROMETHAZINE HYDROCHLORIDE 25 MG/1
25 TABLET ORAL ONCE AS NEEDED
Status: DISCONTINUED | OUTPATIENT
Start: 2022-04-28 | End: 2022-04-28 | Stop reason: HOSPADM

## 2022-04-28 RX ORDER — SODIUM CHLORIDE 0.9 % (FLUSH) 0.9 %
3-10 SYRINGE (ML) INJECTION AS NEEDED
Status: DISCONTINUED | OUTPATIENT
Start: 2022-04-28 | End: 2022-04-28 | Stop reason: HOSPADM

## 2022-04-28 RX ORDER — HYDROCODONE BITARTRATE AND ACETAMINOPHEN 5; 325 MG/1; MG/1
1 TABLET ORAL EVERY 6 HOURS PRN
Qty: 8 TABLET | Refills: 0 | Status: SHIPPED | OUTPATIENT
Start: 2022-04-28 | End: 2022-05-16

## 2022-04-28 RX ORDER — LIDOCAINE HYDROCHLORIDE 10 MG/ML
0.5 INJECTION, SOLUTION EPIDURAL; INFILTRATION; INTRACAUDAL; PERINEURAL ONCE AS NEEDED
Status: DISCONTINUED | OUTPATIENT
Start: 2022-04-28 | End: 2022-04-28 | Stop reason: HOSPADM

## 2022-04-28 RX ORDER — ONDANSETRON 2 MG/ML
INJECTION INTRAMUSCULAR; INTRAVENOUS AS NEEDED
Status: DISCONTINUED | OUTPATIENT
Start: 2022-04-28 | End: 2022-04-28 | Stop reason: SURG

## 2022-04-28 RX ADMIN — TILMANOCEPT 1 DOSE: KIT at 09:25

## 2022-04-28 RX ADMIN — LIDOCAINE HYDROCHLORIDE 60 MG: 20 INJECTION, SOLUTION INFILTRATION; PERINEURAL at 10:12

## 2022-04-28 RX ADMIN — DEXAMETHASONE SODIUM PHOSPHATE 8 MG: 10 INJECTION INTRAMUSCULAR; INTRAVENOUS at 10:17

## 2022-04-28 RX ADMIN — FAMOTIDINE 20 MG: 10 INJECTION INTRAVENOUS at 07:56

## 2022-04-28 RX ADMIN — DIAZEPAM 5 MG: 5 TABLET ORAL at 07:14

## 2022-04-28 RX ADMIN — SODIUM CHLORIDE, POTASSIUM CHLORIDE, SODIUM LACTATE AND CALCIUM CHLORIDE 9 ML/HR: 600; 310; 30; 20 INJECTION, SOLUTION INTRAVENOUS at 09:51

## 2022-04-28 RX ADMIN — SCOPALAMINE 1 PATCH: 1 PATCH, EXTENDED RELEASE TRANSDERMAL at 07:56

## 2022-04-28 RX ADMIN — ONDANSETRON 4 MG: 2 INJECTION INTRAMUSCULAR; INTRAVENOUS at 12:27

## 2022-04-28 RX ADMIN — FENTANYL CITRATE 50 MCG: 50 INJECTION, SOLUTION INTRAMUSCULAR; INTRAVENOUS at 11:59

## 2022-04-28 RX ADMIN — CEFAZOLIN SODIUM 2 G: 2 INJECTION, SOLUTION INTRAVENOUS at 09:59

## 2022-04-28 RX ADMIN — Medication 3 ML: at 09:12

## 2022-04-28 RX ADMIN — HYDROMORPHONE HYDROCHLORIDE 0.5 MG: 2 INJECTION, SOLUTION INTRAMUSCULAR; INTRAVENOUS; SUBCUTANEOUS at 11:20

## 2022-04-28 RX ADMIN — ONDANSETRON 4 MG: 2 INJECTION INTRAMUSCULAR; INTRAVENOUS at 11:15

## 2022-04-28 RX ADMIN — HYDROCODONE BITARTRATE AND ACETAMINOPHEN 1 TABLET: 7.5; 325 TABLET ORAL at 11:54

## 2022-04-28 RX ADMIN — FENTANYL CITRATE 50 MCG: 50 INJECTION INTRAMUSCULAR; INTRAVENOUS at 10:12

## 2022-04-28 RX ADMIN — PROPOFOL 150 MG: 10 INJECTION, EMULSION INTRAVENOUS at 10:12

## 2022-04-28 RX ADMIN — LIDOCAINE AND PRILOCAINE 1 APPLICATION: 25; 25 CREAM TOPICAL at 07:14

## 2022-04-28 RX ADMIN — FENTANYL CITRATE 50 MCG: 50 INJECTION INTRAMUSCULAR; INTRAVENOUS at 10:16

## 2022-04-28 RX ADMIN — EPHEDRINE SULFATE 10 MG: 50 INJECTION INTRAVENOUS at 11:11

## 2022-04-28 NOTE — ANESTHESIA PROCEDURE NOTES
Airway  Urgency: elective    Date/Time: 4/28/2022 10:13 AM  Airway not difficult    General Information and Staff    Patient location during procedure: OR  Anesthesiologist: Juan Stanton MD  CRNA/CAA: Janelle Cotto CRNA    Indications and Patient Condition  Indications for airway management: airway protection    Preoxygenated: yes  Mask difficulty assessment: 1 - vent by mask    Final Airway Details  Final airway type: supraglottic airway      Successful airway: classic  Size 4    Number of attempts at approach: 1  Assessment: lips, teeth, and gum same as pre-op    Additional Comments  PreO2, IV induction, easy mask, LMA placed w/o difficulty, cuff air placed, secured in placed, EBBSH, +etCO2, atraumatic, teeth and lips as preop.

## 2022-04-28 NOTE — ANESTHESIA POSTPROCEDURE EVALUATION
Patient: Arline Zaragoza    Procedure Summary     Date: 04/28/22 Room / Location:  [facility]    Anesthesia Start: 1001 Anesthesia Stop: 1124    Procedure: RIGHT BREAST WIRE LOCALIZED LUMPECTOMY WITH SENTINEL NODE BIOPSY (Right Breast) Diagnosis:       Malignant neoplasm of right breast in female, estrogen receptor positive, unspecified site of breast (HCC)      (Malignant neoplasm of right breast in female, estrogen receptor positive, unspecified site of breast (HCC) [C50.911, Z17.0])    Surgeons: Allie Bah MD Provider: Juan Stanton MD    Anesthesia Type: general ASA Status: 3          Anesthesia Type: general    Vitals  Vitals Value Taken Time   /78 04/28/22 1201   Temp 36.6 °C (97.8 °F) 04/28/22 1123   Pulse 85 04/28/22 1217   Resp 16 04/28/22 1200   SpO2 95 % 04/28/22 1217   Vitals shown include unvalidated device data.        Post Anesthesia Care and Evaluation    Patient location during evaluation: bedside  Patient participation: complete - patient participated  Level of consciousness: awake and alert  Pain management: adequate  Airway patency: patent  Anesthetic complications: No anesthetic complications    Cardiovascular status: acceptable  Respiratory status: acceptable  Hydration status: acceptable    Comments: /78 (BP Location: Left arm, Patient Position: Lying)   Pulse 78   Temp 36.6 °C (97.8 °F) (Temporal)   Resp 16   SpO2 100%

## 2022-04-28 NOTE — ANESTHESIA PREPROCEDURE EVALUATION
Anesthesia Evaluation     Patient summary reviewed and Nursing notes reviewed   no history of anesthetic complications:  NPO Solid Status: > 8 hours  NPO Liquid Status: > 2 hours           Airway   Mallampati: II  TM distance: >3 FB  Neck ROM: full  Dental - normal exam   (+) poor dentition    Comment: Loose lower front tooth    Pulmonary - normal exam    breath sounds clear to auscultation  (+) a smoker (40 pack years) Current Abstained day of surgery,   (-) shortness of breath, recent URI  Cardiovascular - normal exam    Rhythm: regular  Rate: normal    (+) CAD, dysrhythmias Tachycardia, hyperlipidemia,  carotid artery disease left carotid  (-) angina, orthopnea, PND, ARMSTRONG    ROS comment: Non specific changes, stress neg for ischemia    Neuro/Psych- negative ROS  GI/Hepatic/Renal/Endo - negative ROS   (-) liver disease, no renal disease, diabetes    Musculoskeletal     (+) back pain,   Abdominal    Substance History - negative use     OB/GYN negative ob/gyn ROS         Other      history of cancer (Cervical and Malignant neoplasm of right breast in female, estrogen receptor positive ) active                      Anesthesia Plan    ASA 3     general     intravenous induction     Anesthetic plan, all risks, benefits, and alternatives have been provided, discussed and informed consent has been obtained with: patient.

## 2022-05-02 ENCOUNTER — NURSE NAVIGATOR (OUTPATIENT)
Dept: OTHER | Facility: HOSPITAL | Age: 67
End: 2022-05-02

## 2022-05-02 ENCOUNTER — TELEPHONE (OUTPATIENT)
Dept: SURGERY | Facility: CLINIC | Age: 67
End: 2022-05-02

## 2022-05-02 DIAGNOSIS — C50.919 MALIGNANT NEOPLASM OF FEMALE BREAST, UNSPECIFIED ESTROGEN RECEPTOR STATUS, UNSPECIFIED LATERALITY, UNSPECIFIED SITE OF BREAST: Primary | ICD-10-CM

## 2022-05-02 NOTE — TELEPHONE ENCOUNTER
Called Arline Zaragoza regarding recent surgical pathology.    Right breast invasive mammary carcinoma (5 x 5 x 5 mm), grade II, margins negative, ER+/NV+, her2-.  0/7 lymph nodes.   PT1aN0    Referral placed for medical oncology, radiation oncology.   Oncotype Dx sent.   Follow up in two weeks for wound check and further cancer surveillance planning.     CLINICAL POOL: Can you send an Oncotype Dx please?     Allie Bah MD

## 2022-05-02 NOTE — PROGRESS NOTES
Called Ms. Zaragoza to see how she was doing. She stated she is recovering well from surgery with mild pain. She had a questions about the axilla incision site and we discussed that. Otherwise she is doing well and has no needs at this time. She was thankful for the call and will reach out if any questions or needs arise.

## 2022-05-12 NOTE — PROGRESS NOTES
General Surgery Breast Cancer History and Physical Exam     Summary:    Arline Zaragoza is a 67 y.o. lady. The patient presents with a history of right breast invasive ductal carcinoma: Grade 3,  ER +/MO +, Her2 -; pT1aN0, Anatomic Stage 1A, Prognostic Stage 1A.      A multidisciplinary plan has been formulated for the patient:    (1) Breast Surgical Oncology:  -[employer] 9 panel genetic testing: negative   -s/p R breast wire localized lumpectomy with SLN biopsy 4/2022.   -Annual mammogram due 1/2023.   -Follow up 2/2023.     (2) Medical Oncology:  -Appointment with Dr. Desai 5/26/2022.  -Oncotype Dx 20.    (3) Radiation Oncology:  -Appointment with Dr. Delvalle 6/1/2022.     Referring Provider: No ref. provider found    Chief Complaint: abnormal breast imaging    History of Present Illness: Ms. Arline Zaragoza is a 67 y.o. year old lady, seen at the request of No ref. provider found for a new diagnosis of right breast cancer.      This was initially detected as an imaging abnormality. She has not had annual mammograms each year and skipped many years. Her most recent prior mammogram was at least 10 years ago. She denies any prior history of abnormal mammograms or breast biopsies. Her work-up is detailed in the oncologic history below.     She denies any palpable breast lumps, pain, skin changes, or nipple discharge. She denies any family history of breast or ovarian cancer.     5/16/2022 she is doing well since surgery with no complaints or problems.  She did have a little leaking from her incision last night but that was the only episode and it is now stopped.  She is getting back to her daily activities.    Workup of Current Diagnosis:    01/17/2022 - Bilateral Screening Mammogram:  Right breast  Impression: Architectural distortion.  BIRADS 0    02/15/2022 - Diagnostic Mammogram with Ultrasound:   Right breast - There is a persistent area of architecture distortion in the upper central posterior right breast on  ultrasound. At 11:30, 4 cm from the nipple, there is an incidental 0.7 cm benign appearing cluster cyst. At 12:00, 4 cm from the nipple, there is a vague regular hypoechoic mass area of shadowing measuring 0.7 x 0.6 x 0.6 cm in that region.  Impression: Suspicious, recommend stereotactic biopsy.  BIRADS 4    Pathology:   Pathology: Right breast 12:00, 4 cm from the nipple invasive ductal carcinoma, grade 2, measures up to 6.5 mm, ER positive, NH positive, HER2 negative, Ki-67 3%.    3/16/2022 Bilateral Breast MRI:   IMPRESSION:   IMPRESSION:  1. Biopsy-proven malignancy in the right breast in the posterior one-third at the 12-o'clock position measuring on the order of 1.1 cm in greatest dimension with the bowtie-shaped metallic clip positioned on the order of 0.7 cm superior and slightly medial to the epicenter of the suspected residual malignancy within a hematoma cavity that measures on the order of 2 cm in greatest dimension. No other suspicious findings are seen within the right breast and there is no evidence for right axillary adenopathy.  2. There are no findings suspicious for malignancy in the left breast.   BI-RADS category 6: Known biopsy-proven malignancy.    4/28/2022 Right breast wire localized lumpectomy with sentinel lymph node biopsy  Final Diagnosis   1. Right Breast, Oriented Needle Localization Lumpectomy (13 grams):  INVASIVE MAMMARY CARCINOMA, NO      SPECIAL TYPE (INVASIVE DUCTAL CARCINOMA).               A. Tumor site:  12:00 o'clock.               B. Histologic Grade:  Latonya.               C. Histologic Score:  II (tubules = 3, nuclei = 2, mitoses = 1).               D. Tumor size:  5 x 5 x 5 mm.               E. Tumor focality:  Single focus.               F. No definitive in-situ component identified.                G. No lobular neoplasia identified.               H. No lymphovascular nor perineural invasion identified.               I. Margins:  Uninvolved by invasive carcinoma.                             1. Invasive carcinoma is present 2.4 mm from the original inferior margin, 5 mm from the medial margin,                                10 mm from the superior margin, 1 mm from the anterior, 18 mm from the posterior margin and 20 mm                                lateral margin of resection in specimen 1.               J. Lymph node:  Seven total lymph nodes, negative for metastatic carcinoma (0/7) (specimens 6-8).               K. Hormone receptor status:  ER positive (99%), CT positive (25%), HER-2 negative (1+), Ki67 3% (performed on                   prior biopsy [id number]).               L. Pathologic stage:  pT1a,N0.     2. Right Breast, Additional Superior Margin, Oriented Excision:                 A. Benign unremarkable breast tissue.               B. Final superior margin free by an additional 7 mm.       3. Right Breast, Additional Lateral Margin, Oriented Excision:               A. Benign breast tissue with clustered apocrine cyst.               B. Final lateral margin free by an additional 12 mm.     4. Right Breast, Additional Medial Margin, Oriented Excision:               A. Benign unremarkable breast tissue.               B. Final medial margin free by an additional 6 mm.     5. Right Breast, Additional Inferior Margin, Oriented Excision:               A. Benign breast tissue with focal usual ductal hyperplasia.               B. No atypical hyperplasia, in-situ nor invasive carcinoma identified.                 C. Final inferior margin free by an additional 6 mm.       6. [place] Lymph Node #1, Right Axilla, Excision:                 A. Five lymph nodes, negative for metastatic carcinoma (0/5).     7. [place] Lymph Node #2, Right Axilla, Excision:               A. One lymph node, negative for metastatic carcinoma (0/1).     8. [place] Lymph Node #3, Right Axilla, Excision:                 A. One lymph node, negative for metastatic carcinoma (0/1).     Gynecologic History:    . P:2 AB:0  Age at first childbirth: 28 or 29  Lactation/How long: Did not breastfeed.  Age at menarche: 15  Age at menopause: 45  Total years of oral contraceptive use: 2  Total years of hormone replacement therapy: 0    Past Medical History:   Past Medical History:   Diagnosis Date   • Back pain      had ruptured disc no surgery so prn c/o pain   • Breast cancer (HCC) 2022    Right breast invasive ductal adenocarcinoma, ER/NJ positive, HER2 negative   • Carotid artery stenosis    • Cervical cancer (HCC)    • Colon polyp 2021    Rectum: hyperplastic polyp   • Diverticulosis    • Fibromuscular dysplasia (HCC)     dx 2 yrs ago   • H/O bronchitis    • Hyperlipidemia    • Loose stools    • Osteoporosis    • Positive colorectal cancer screening using Cologuard test    • Psoriasis        Past Surgical History:    Past Surgical History:   Procedure Laterality Date   • BREAST BIOPSY Right 2022    US guided mammotoma vaccum assisted right breast biopsy-Dr. Julián Palacios, [facility]   • BREAST LUMPECTOMY WITH SENTINEL NODE BIOPSY Right 2022    Procedure: RIGHT BREAST WIRE LOCALIZED LUMPECTOMY WITH SENTINEL NODE BIOPSY;  Surgeon: Allie Bah MD;  Location:  [facility];  Service: General;  Laterality: Right;   • COLONOSCOPY N/A 2021    Procedure: COLONOSCOPY INTO CECUM AND TI WITH COLD BX POLYPECTOMIES;  Surgeon: Catia Kate MD;  Location: [facility];  Service: Gastroenterology;  Laterality: N/A;  PRE: POSITIVE COLOGUARD  POST: DIVERTICULOSIS, POLYPS, HEMORRHOIDS   • NJ THROMBOENDARTECTMY NECK,NECK INCIS Left 2016    Procedure: LT CAROTID ENDARTERECTOMY WITH INTRA OPERATIVE CAROTID DUPLEX SCAN;  Surgeon: Arnulfo Hernandez MD;  Location: [facility] MAIN OR;  Service: Vascular   • SUBTOTAL HYSTERECTOMY Bilateral 1987    done at [facility], ovaries still in tact       Family History:    Family History   Problem Relation Age of Onset   • Vision loss Mother    • Skin cancer Father     • Skin cancer Sister    • Depression Daughter    • Malig Hyperthermia Neg Hx    • Breast cancer Neg Hx    • Ovarian cancer Neg Hx      Social History:  • Smokes cigarettes, 1 pack per day  • Occasional alcohol use    Allergies:   No Known Allergies    Medications:     Current Outpatient Medications:   •  aspirin 81 MG EC tablet, Take 81 mg by mouth Daily., Disp: , Rfl:   •  atorvastatin (LIPITOR) 20 MG tablet, Take 1 tablet by mouth Every Night. (Patient taking differently: Take 20 mg by mouth Every Night. Pharmacist said To call MD surgeon about telling pt to stop prior to surgery), Disp: 90 tablet, Rfl: 3  •  Calcium Carbonate (CALCIUM 500 PO), Take 1 tablet by mouth Daily., Disp: , Rfl:   •  Chlorhexidine Gluconate Cloth 2 % pads, Apply 1 application topically. USE AS DIRECTED PREOP, Disp: , Rfl:   •  Cholecalciferol (Vitamin D) 50 MCG (2000 UT) capsule, Take 2,000 Units by mouth Daily., Disp: , Rfl:   •  HYDROcodone-acetaminophen (Norco) 5-325 MG per tablet, Take 1 tablet by mouth Every 6 (Six) Hours As Needed for Mild Pain ., Disp: 8 tablet, Rfl: 0    Laboratory Values:    Labs from 05/2022 reviewed    Review of Systems:   Influenza-like illness: no fever, no  cough, no  sore throat, no  body aches, no loss of sense of taste or smell, no known exposure to person with Covid-19.  Constitutional: Negative for fevers or chills  HENT: Negative for hearing loss or runny nose  Eyes: Negative for vision changes or scleral icterus  Respiratory: Negative for cough or shortness of breath  Cardiovascular: Negative for chest pain or heart palpitations  Gastrointestinal: Negative for abdominal pain, nausea, vomiting, constipation, melena, or hematochezia  Genitourinary: Negative for hematuria or dysuria  Musculoskeletal: Negative for joint swelling or gait instability  Neurologic: Negative for tremors or seizures  Psychiatric: Negative for suicidal ideations or depression  All other systems reviewed and  negative    Physical Exam:   • ECO - Asymptomatic  • Constitutional: Well-developed well-nourished, no acute distress  • Eyes: Conjunctiva normal, sclera nonicteric  • ENMT: Hearing grossly normal, oral mucosa moist  • Neck: Supple, no palpable mass, trachea midline  • Respiratory: Clear to auscultation, normal inspiratory effort  • Cardiovascular: Regular rate, no peripheral edema, no jugular venous distention  • Breast: symmetric,   o Right: No visible abnormalities on inspection while seated, with arms raised or hands on hips. No masses, skin changes, or nipple abnormalities.  Incision clean and dry, small hematoma of the right breast, no axillary swelling or cellulitis  o Left:  No visible abnormalities on inspection while seated, with arms raised or hands on hips. No masses, skin changes, or nipple abnormalities.  o No clinical chest wall involvement.  • Gastrointestinal: Soft, nontender  • Lymphatics (palpable nodes): No cervical, supraclavicular or axillary lymphadenopathy  • Skin:  Warm, dry, no rash on visualized skin surfaces  • Musculoskeletal: Symmetric strength, normal gait  • Psychiatric: Alert and oriented ×3, normal affect     AGUSTINA BRUSH M.D.  General and Endoscopic Surgery  [facility]    [address], [address]  [place], [place], [address]  P: [phone]  F: [phone]

## 2022-05-16 ENCOUNTER — OFFICE VISIT (OUTPATIENT)
Dept: SURGERY | Facility: CLINIC | Age: 67
End: 2022-05-16

## 2022-05-16 VITALS — HEIGHT: 66 IN | BODY MASS INDEX: 19.93 KG/M2 | WEIGHT: 124 LBS

## 2022-05-16 DIAGNOSIS — C50.911 MALIGNANT NEOPLASM OF RIGHT FEMALE BREAST, UNSPECIFIED ESTROGEN RECEPTOR STATUS, UNSPECIFIED SITE OF BREAST: Primary | ICD-10-CM

## 2022-05-16 LAB
LAB AP CASE REPORT: NORMAL
LAB AP CLINICAL INFORMATION: NORMAL
LAB AP DIAGNOSIS COMMENT: NORMAL
LAB AP SYNOPTIC CHECKLIST: NORMAL
Lab: NORMAL
PATH REPORT.ADDENDUM SPEC: NORMAL
PATH REPORT.FINAL DX SPEC: NORMAL
PATH REPORT.GROSS SPEC: NORMAL

## 2022-05-16 PROCEDURE — 99024 POSTOP FOLLOW-UP VISIT: CPT | Performed by: STUDENT IN AN ORGANIZED HEALTH CARE EDUCATION/TRAINING PROGRAM

## 2022-05-19 ENCOUNTER — TELEPHONE (OUTPATIENT)
Dept: SURGERY | Facility: CLINIC | Age: 67
End: 2022-05-19

## 2022-05-19 NOTE — TELEPHONE ENCOUNTER
Called patient back and informed her of Dr. Bah's response. Advised that she call if the bleeding gets worse. She is aware to also massage and apply ice to the area.

## 2022-05-19 NOTE — TELEPHONE ENCOUNTER
Patient called today with concern that she has had continued bleeding from her incision site hematoma since she was seen this past Monday. Small dribbles of bright red blood. Advised to place a bandage over top of the site or put gauze in her bra to cover the area. She states she was told to call with continued bleeding. I will forward to Dr. Bah to see she wants to see the patient in the office.

## 2022-05-26 ENCOUNTER — LAB (OUTPATIENT)
Dept: LAB | Facility: HOSPITAL | Age: 67
End: 2022-05-26

## 2022-05-26 ENCOUNTER — CONSULT (OUTPATIENT)
Dept: ONCOLOGY | Facility: CLINIC | Age: 67
End: 2022-05-26

## 2022-05-26 VITALS
DIASTOLIC BLOOD PRESSURE: 70 MMHG | HEART RATE: 83 BPM | RESPIRATION RATE: 16 BRPM | WEIGHT: 122.8 LBS | BODY MASS INDEX: 20.46 KG/M2 | TEMPERATURE: 97.1 F | OXYGEN SATURATION: 98 % | HEIGHT: 65 IN | SYSTOLIC BLOOD PRESSURE: 167 MMHG

## 2022-05-26 DIAGNOSIS — C50.011 MALIGNANT NEOPLASM OF NIPPLE OF RIGHT BREAST IN FEMALE, ESTROGEN RECEPTOR POSITIVE: Primary | ICD-10-CM

## 2022-05-26 DIAGNOSIS — C50.919 MALIGNANT NEOPLASM OF FEMALE BREAST, UNSPECIFIED ESTROGEN RECEPTOR STATUS, UNSPECIFIED LATERALITY, UNSPECIFIED SITE OF BREAST: Primary | ICD-10-CM

## 2022-05-26 DIAGNOSIS — Z17.0 MALIGNANT NEOPLASM OF NIPPLE OF RIGHT BREAST IN FEMALE, ESTROGEN RECEPTOR POSITIVE: Primary | ICD-10-CM

## 2022-05-26 LAB
BASOPHILS # BLD AUTO: 0.04 10*3/MM3 (ref 0–0.2)
BASOPHILS NFR BLD AUTO: 0.4 % (ref 0–1.5)
DEPRECATED RDW RBC AUTO: 46.1 FL (ref 37–54)
EOSINOPHIL # BLD AUTO: 0.07 10*3/MM3 (ref 0–0.4)
EOSINOPHIL NFR BLD AUTO: 0.7 % (ref 0.3–6.2)
ERYTHROCYTE [DISTWIDTH] IN BLOOD BY AUTOMATED COUNT: 13.5 % (ref 12.3–15.4)
HCT VFR BLD AUTO: 44.9 % (ref 34–46.6)
HGB BLD-MCNC: 14.9 G/DL (ref 12–15.9)
IMM GRANULOCYTES # BLD AUTO: 0.03 10*3/MM3 (ref 0–0.05)
IMM GRANULOCYTES NFR BLD AUTO: 0.3 % (ref 0–0.5)
LYMPHOCYTES # BLD AUTO: 3 10*3/MM3 (ref 0.7–3.1)
LYMPHOCYTES NFR BLD AUTO: 27.9 % (ref 19.6–45.3)
MCH RBC QN AUTO: 30.7 PG (ref 26.6–33)
MCHC RBC AUTO-ENTMCNC: 33.2 G/DL (ref 31.5–35.7)
MCV RBC AUTO: 92.6 FL (ref 79–97)
MONOCYTES # BLD AUTO: 0.86 10*3/MM3 (ref 0.1–0.9)
MONOCYTES NFR BLD AUTO: 8 % (ref 5–12)
NEUTROPHILS NFR BLD AUTO: 6.76 10*3/MM3 (ref 1.7–7)
NEUTROPHILS NFR BLD AUTO: 62.7 % (ref 42.7–76)
NRBC BLD AUTO-RTO: 0 /100 WBC (ref 0–0.2)
PLATELET # BLD AUTO: 193 10*3/MM3 (ref 140–450)
PMV BLD AUTO: 11.8 FL (ref 6–12)
RBC # BLD AUTO: 4.85 10*6/MM3 (ref 3.77–5.28)
WBC NRBC COR # BLD: 10.76 10*3/MM3 (ref 3.4–10.8)

## 2022-05-26 PROCEDURE — 85025 COMPLETE CBC W/AUTO DIFF WBC: CPT

## 2022-05-26 PROCEDURE — 36416 COLLJ CAPILLARY BLOOD SPEC: CPT

## 2022-05-26 PROCEDURE — 99205 OFFICE O/P NEW HI 60 MIN: CPT | Performed by: INTERNAL MEDICINE

## 2022-05-26 NOTE — PROGRESS NOTES
Subjective     REASON FOR CONSULTATION:  . T1a/T1BN0 invasive ductal carcinoma of the right breast, 12 o'clock position, grade 2 with a Gilbert score of 6 with tumor size of 5 x 5 x 5 mm, on biopsy was 6.5 mm, margins were uninvolved by invasive carcinoma.  7 lymph nodes were negative.  ER 99%, SC 25%, HER2/sanchez 1+ negative, Ki-67 3%.  Margins are clear.    Provide an opinion on any further workup or treatment                             REQUESTING PHYSICIAN: Dr. Glenys Rao    RECORDS OBTAINED:  Records of the patients history including those obtained from the referring provider were reviewed and summarized in detail.    HISTORY OF PRESENT ILLNESS:  The patient is a 67 y.o. year old female who is here for an opinion about the above issue.    History of Present Illness     Patient is a 67-year-old female who was found to have an abnormality seen on the screening mammogram.  She had skipped mammograms for many years.  Her most recent mammogram was at least 10 years ago.  She denied having any masses that was palpable.    Details are as follows.    January 17, 2022:  FINDINGS:  The breasts are heterogeneously dense, which may obscure small masses.     There is an area of architectural distortion in the upper inner  posterior right breast, which is best appreciated on Tomosynthesis and  on the MLO view. There are no suspicious masses, calcifications, or  areas of architectural distortion in the left breast.    IMPRESSION/RECOMMENDATION(S):  Right breast architectural distortion. Recommend further evaluation with  CC and MLO spot compression and lateral views with Tomosynthesis and  targeted ultrasound.    February 15, 2022:  Right breast diagnostic mammogram and ultrasound  There is a persistent area of architectural distortion in the upper  central posterior right breast, best appreciated with Tomosynthesis.  This area was further assessed with ultrasound.     ULTRASOUND:  Targeted sonographic evaluation of  the right breast was performed from  12:00 to 1:00 in the region of the mammographic abnormality. At 11:30, 4  cm from the nipple, there is an incidental 0.6 x 0.3 x 0.7 cm  benign-appearing cluster of cysts. At 12:00, 4 cm from the nipple, there  is a vague irregular hypoechoic mass/area of shadowing measuring 0.7 x  0.6 x 0.6 cm in the region of the mammographic distortion.        IMPRESSION:  Suspicious 0.7 cm mass/area of shadowing at 12:00 in the right breast.  Recommend further evaluation with ultrasound-guided core needle biopsy  and clip correlation with post biopsy mammogram. If the biopsy clip does  not correspond to the area of mammographic architectural distortion in  the upper central posterior right breast, further evaluation with  stereotactic/Tomosynthesis guided core needle biopsy would then be  recommended.    March 9, 2022: Ultrasound-guided right breast biopsy at 12:00 showed    Breast, Right, 12:00 o'clock, 4 cm from Nipple, Biopsy:  A. Invasive ductal adenocarcinoma, [place] grade II (tubular grade=3, nuclear grade=2, mitotic grade=1).  B. Microcalcifications are associated with the invasive tumor and non-neoplastic tissue.  C. Ductal carcinoma in-situ is not identified.  D. The tumor measures up to 6.5 mm.    ER: 99%  SD: 25%  HER2/sanchez: 1+ negative  Ki-67 3%    March 17, 2022: MRI of the bilateral breast    IMPRESSION:  1. Biopsy-proven malignancy in the right breast in the posterior  one-third at the 12-o'clock position measuring on the order of 1.1 cm in  greatest dimension with the bowtie-shaped metallic clip positioned on  the order of 0.7 cm superior and slightly medial to the epicenter of the  suspected residual malignancy within a hematoma cavity that measures on  the order of 2 cm in greatest dimension. No other suspicious findings  are seen within the right breast and there is no evidence for right  axillary adenopathy.  2. There are no findings suspicious for malignancy in the  left breast.     April 28, 2022: Right breast lumpectomy with sentinel lymph node surgery    Synoptic Checklist  INVASIVE CARCINOMA OF THE BREAST: Resection (INVASIVE CARCINOMA OF THE BREAST: COMPLETE EXCISION - All Specimens)  Procedure Excision (less than total mastectomy)  Specimen Laterality Right  .  TUMOR  Tumor Site Clock position  12 o'clock  Histologic Type Invasive carcinoma of no special type (ductal)  Histologic Grade ([place] Histologic Score)  Glandular (Acinar) / Tubular Differentiation Score 3  Nuclear Pleomorphism  .  1. Breast, Right.  Mitotic Rate Score 1  Overall Grade Grade 2 (scores of 6 or 7)  Tumor Size Greatest dimension of largest invasive focus (Millimeters): 5 mm  Additional Dimension (Millimeters) 5 mm  5 mm  Tumor Focality Single focus of invasive carcinoma  Ductal Carcinoma In Situ (DCIS) Not identified  Lobular Carcinoma In Situ (LCIS) Not identified  Lymphovascular Invasion Not identified  Dermal Lymphovascular Invasion No skin present  Microcalcifications Present in invasive carcinoma  Present in non-neoplastic tissue  Treatment Effect in the Breast No known presurgical therapy  .  MARGINS  Margin Status for Invasive Carcinoma All margins negative for invasive carcinoma  Distance from Invasive Carcinoma to Closest Margin 8.4 mm  Closest Margin(s) to Invasive Carcinoma Inferior  Distance from Invasive Carcinoma to Anterior Margin 11 mm  Distance from Invasive Carcinoma to Posterior Margin 18 mm  Distance from Invasive Carcinoma to Superior Margin 17 mm  Distance from Invasive Carcinoma to Inferior Margin 8.4 mm  Distance from Invasive Carcinoma to Medial Margin 11 mm  Distance from Invasive Carcinoma to Lateral Margin 22 mm  .  REGIONAL LYMPH NODES  Regional Lymph Node Status All regional lymph nodes negative for tumor  Total Number of Lymph Nodes Examined (sentinel and non-sentinel) 7  Number of [place] Nodes Examined 7  .      Past Medical History:   Diagnosis Date   • Back  pain     2005 had ruptured disc no surgery so prn c/o pain   • Breast cancer (HCC) 03/09/2022    Right breast invasive ductal adenocarcinoma, ER/[place] positive, HER2 negative   • Carotid artery stenosis    • Cervical cancer (HCC)    • Colon polyp 08/20/2021    Rectum: hyperplastic polyp   • Diverticulosis    • Fibromuscular dysplasia (HCC)     dx 2 yrs ago   • H/O bronchitis    • Hyperlipidemia    • Loose stools    • Osteoporosis    • Positive colorectal cancer screening using Cologuard test    • Psoriasis         Past Surgical History:   Procedure Laterality Date   • BREAST BIOPSY Right 03/09/2022    US guided mammotome vaccum assisted right breast biopsy-Dr. Julián Palacios, [facility]   • BREAST LUMPECTOMY WITH SENTINEL NODE BIOPSY Right 04/28/2022    Procedure: RIGHT BREAST WIRE LOCALIZED LUMPECTOMY WITH SENTINEL NODE BIOPSY;  Surgeon: Allie Bah MD;  Location: [facility] OR [facility];  Service: General;  Laterality: Right;   • COLONOSCOPY N/A 08/20/2021    Procedure: COLONOSCOPY INTO CECUM AND TI WITH COLD BX POLYPECTOMIES;  Surgeon: Catia Kate MD;  Location: [facility] ENDOSCOPY;  Service: Gastroenterology;  Laterality: N/A;  PRE: POSITIVE COLOGUARD  POST: DIVERTICULOSIS, POLYPS, HEMORRHOIDS   • [place] THROMBOENDARTECTMY NECK,NECK INCIS Left 09/14/2016    Procedure: LT CAROTID ENDARTERECTOMY WITH INTRA OPERATIVE CAROTID DUPLEX SCAN;  Surgeon: Arnulfo Hernandez MD;  Location: [facility] OR;  Service: Vascular   • SUBTOTAL HYSTERECTOMY Bilateral 09/1987    done at [facility], ovaries still in tact        Current Outpatient Medications on File Prior to Visit   Medication Sig Dispense Refill   • aspirin 81 MG EC tablet Take 81 mg by mouth Daily.     • atorvastatin (LIPITOR) 20 MG tablet Take 1 tablet by mouth Every Night. (Patient taking differently: Take 20 mg by mouth Every Night. Pharmacist said To call MD surgeon about telling pt to stop prior to surgery) 90 tablet 3   • Calcium Carbonate (CALCIUM 500 PO) Take 1 tablet by mouth  Daily.     • Cholecalciferol (Vitamin D) 50 MCG (2000 UT) capsule Take 2,000 Units by mouth Daily.       No current facility-administered medications on file prior to visit.        ALLERGIES:  No Known Allergies     Social History     Socioeconomic History   • Marital status: Single   Tobacco Use   • Smoking status: Current Every Day Smoker     Packs/day: 1.00     Years: 40.00     Pack years: 40.00     Types: Cigarettes     Start date: 1982   • Smokeless tobacco: Never Used   • Tobacco comment:  PPD   Vaping Use   • Vaping Use: Never used   Substance and Sexual Activity   • Alcohol use: Yes     Alcohol/week: 6.0 standard drinks     Types: 2 Glasses of wine, 4 Cans of beer per week   • Drug use: Never   • Sexual activity: Not Currently        Family History   Problem Relation Age of Onset   • Vision loss Mother    • Skin cancer Father    • Skin cancer Sister    • Depression Daughter    • Malig Hyperthermia Neg Hx    • Breast cancer Neg Hx    • Ovarian cancer Neg Hx       OB/GYN history:  Age of menarche: 15  Age of menopause: 45  Age of first childbirth 28   2 para 2 no miscarriages  Total number of years of contraceptive use: 2 years  No hormone replacement therapy given    Family history: Negative for breast or ovarian cancer.  Patient had cervical cancer when she was very young and had to undergo hysterectomy.    Review of Systems   Constitutional: Negative for appetite change, chills, diaphoresis, fatigue, fever and unexpected weight change.   HENT: Negative for hearing loss, sore throat and trouble swallowing.    Respiratory: Negative for cough, chest tightness, shortness of breath and wheezing.    Cardiovascular: Negative for chest pain, palpitations and leg swelling.   Gastrointestinal: Negative for abdominal distention, abdominal pain, constipation, diarrhea, nausea and vomiting.   Genitourinary: Negative for dysuria, frequency, hematuria and urgency.   Musculoskeletal: Negative for joint  "swelling.        No muscle weakness.   Skin: Negative for rash and wound.   Neurological: Negative for seizures, syncope, speech difficulty, weakness, numbness and headaches.   Hematological: Negative for adenopathy. Does not bruise/bleed easily.   Psychiatric/Behavioral: Negative for behavioral problems, confusion and suicidal ideas.   All other systems reviewed and are negative.       Objective     Vitals:    05/26/22 1300   BP: 167/70   Pulse: 83   Resp: 16   Temp: 97.1 °F (36.2 °C)   TempSrc: Temporal   SpO2: 98%   Weight: 55.7 kg (122 lb 12.8 oz)   Height: 166.4 cm (65.51\")   PainSc: 0-No pain     Current Status 5/26/2022   ECOG score 0       Physical Exam         This patient's ACP documentation is up to date, and there's nothing further left to document.      CONSTITUTIONAL:  Vital signs reviewed.  No distress, looks comfortable.  RESPIRATORY:  Normal respiratory effort.  Lungs clear to auscultation bilaterally.  BREAST: Right breast: No skin changes, no evidence of breast mass, no nipple discharge, no evidence of any right axillary adenopathy or right supraclavicular adenopathy  Left breast: No evidence of any skin changes, no evidence of any left breast mass and no evidence of left nipple discharge as well as no left axillary adenopathy or left supraclavicular adenopathy.  CARDIOVASCULAR:  Normal S1, S2.  No murmurs rubs or gallops.  No significant lower extremity edema.  GASTROINTESTINAL: Abdomen appears unremarkable.  Nontender.  No hepatomegaly.  No splenomegaly.  LYMPHATIC:  No cervical, supraclavicular, axillary lymphadenopathy.  SKIN:  Warm.  No rashes.  PSYCHIATRIC:  Normal judgment and insight.  Normal mood and affect.    RECENT LABS:  Hematology WBC   Date Value Ref Range Status   04/26/2022 8.36 3.40 - 10.80 10*3/mm3 Final     RBC   Date Value Ref Range Status   04/26/2022 4.86 3.77 - 5.28 10*6/mm3 Final     Hemoglobin   Date Value Ref Range Status   04/26/2022 14.8 12.0 - 15.9 g/dL Final "     Hematocrit   Date Value Ref Range Status   04/26/2022 43.4 34.0 - 46.6 % Final     Platelets   Date Value Ref Range Status   04/26/2022 163 140 - 450 10*3/mm3 Final          Assessment & Plan       1. T1a/T1BN0 invasive ductal carcinoma of the right breast, 12 o'clock position, grade 2 with a [place] score of 6 with tumor size of 5 x 5 x 5 mm, on biopsy was 6.5 mm, margins were uninvolved by invasive carcinoma.  7 lymph nodes were negative.  ER 99%, FL 25%, HER2/sanchez 1+ negative, Ki-67 3%.  Margins are clear.  · Patient's Oncotype DX  shows a score of 20  · Given the patient is highly ER/FL positive HER2 negative and a small tumor we can consider endocrine therapy alone with Oncotype DX score of 20.  · Her benefit from chemotherapy is very minimal and hence we discussed about endocrine therapy  · Tamoxifen causes hot flashes, rare chance for uterine cancer, blood clots, DVT, PE, hair thinning, rare chance for thrombocytopenia, cataracts.  Aromatase inhibitors can cause arthralgias, hot flashes, decrease in bone density, rare chance for diarrhea, also discussed about hot flashes with it.  Evista is approved for osteopenia and can also be used for prophylaxis with fewer side effects except hot flashes and rare chance for cataracts but it can improve bone density.   · Patient is only 3 weeks from surgery and will need to heal up well prior to starting treatment  · Patient has follow-up appointment with radiation oncology June 1    2.  Osteoporosis, patient has not had bone density for some time  Bone density done January 17, 2022 did show osteoporosis    · Will start calcium and vitamin D supplementation    3.  Family history of cancer: No family history of breast or ovarian cancer.  Patient had history of cervical cancer when she was [age] years  · .  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  · Clinical significance of the variant is uncertain.  Familial variation of uncertain  significance testing is not recommended.    Plan  · Reviewed the mammograms, MRI of the breast and pathology  · Reviewed the surgical plans and discussed in length with patient  · Patient has a very small T1a/T1BN0 tumor invasive ductal carcinoma ER/MT positive HER2 negative with Oncotype score going 20 and her age being greater than 50 years would not require chemotherapy but would benefit from endocrine therapy  · I have in length discussed about side effects of endocrine therapy.  · I have reviewed the bone density which shows evidence of osteoporosis  · We discussed options of either trying tamoxifen which would be helpful in patients with osteoporosis versus aromatase inhibitor which is far more beneficial than tamoxifen in postmenopausal women  · In that case I would have to consider Prolia  · We will need to let her heal up from surgery prior to starting Prolia  · Follow-up with me in 4 weeks at which time we will plan to start Arimidex along with Prolia but will start Arimidex towards the end of radiation  · Patient has follow-up with radiation oncology on June 1.    Thank you for allowing me to participate in the care of this patient     I spent 60  total minutes, face-to-face, caring for Arline today.  Greater than 50% of this time involved counseling and/or coordination of care as documented within this note.      MD Dr. Glenys Stanford

## 2022-06-01 ENCOUNTER — CONSULT (OUTPATIENT)
Dept: RADIATION ONCOLOGY | Facility: HOSPITAL | Age: 67
End: 2022-06-01

## 2022-06-01 ENCOUNTER — APPOINTMENT (OUTPATIENT)
Dept: RADIATION ONCOLOGY | Facility: HOSPITAL | Age: 67
End: 2022-06-01

## 2022-06-01 VITALS
OXYGEN SATURATION: 100 % | WEIGHT: 123 LBS | HEART RATE: 74 BPM | TEMPERATURE: 98.9 F | BODY MASS INDEX: 20.49 KG/M2 | SYSTOLIC BLOOD PRESSURE: 164 MMHG | DIASTOLIC BLOOD PRESSURE: 79 MMHG

## 2022-06-01 DIAGNOSIS — C50.911 MALIGNANT NEOPLASM OF RIGHT BREAST IN FEMALE, ESTROGEN RECEPTOR POSITIVE, UNSPECIFIED SITE OF BREAST: Primary | ICD-10-CM

## 2022-06-01 DIAGNOSIS — Z17.0 MALIGNANT NEOPLASM OF RIGHT BREAST IN FEMALE, ESTROGEN RECEPTOR POSITIVE, UNSPECIFIED SITE OF BREAST: Primary | ICD-10-CM

## 2022-06-01 PROCEDURE — G0463 HOSPITAL OUTPT CLINIC VISIT: HCPCS | Performed by: RADIOLOGY

## 2022-06-01 PROCEDURE — 99205 OFFICE O/P NEW HI 60 MIN: CPT | Performed by: RADIOLOGY

## 2022-06-01 RX ORDER — CEPHALEXIN 500 MG/1
500 CAPSULE ORAL 4 TIMES DAILY
Qty: 28 CAPSULE | Refills: 0 | Status: SHIPPED | OUTPATIENT
Start: 2022-06-01 | End: 2022-06-08

## 2022-06-01 NOTE — PROGRESS NOTES
Subjective     Allie Bah MD    Cancer Staging  Stage IA (pT1a, pN0(sn), cM0, G2, ER+, AZ+, HER2-)    CC: right breast cancer, pT1aN0 ER AZ pos Her 2 neg                              Dear Allie Bah MD    I had the pleasure of seeing Arline Zaragoza  today in [facility].   The patient is a 67 y.o. female with recently diagnosed right breast cancer.  She had a screening mammogram on 22 which showed an area of architectural distortion upper central right breast.  She had a diagnostic right mammogram and us on 2/15/22 which confirmed an area of architectural distortion upper central posteriro right breast which corresponds to vague hypoechoic area at 12 oclock.  She underwent a right breast biopsy on 3/22 which revealed invasive ductal carcinoma, grade 2, 6.5mm ER Pos AZ pos Her 2 neg ki67 3%.    She had a breast mri on 3/16/22 which showed biopsy proven malignancy right breast posterior one thrid at 12 oclock 1.1cm in size with clip.    She underwent a right breast lumpectomy and sentinel node biopsy on 22 with pathology revealing invasive ductal carcinoma, grade II, 5mm in size no dcis no lvsi,.  The original inferior margin was 2.4mm from invasive component and 1mm from the anterior margin.  Seven nodes were negative.  ER 99%PR25% Her 2 neg and ki67 3%.  Her pathologic stage was pT1aN0.  Additional inferior margin was negative by 6mm.      She is  with menarche age 15 and first childbirth age 28.  She did not breast feed and went through menopause age 45.  She used oral contraceptives for 2 years and has never used hormone replacement therapy.     She met with medical oncology on 22 Dr. Desai.  Patient's Oncotype DX  shows a score of 20.  She has recommended arimidex.    She is referred today for evaluation for adjuvant radiation.           Review of Systems   Constitutional: Negative.    HENT: Negative.    Respiratory: Negative.    Cardiovascular: Negative.     Gastrointestinal: Negative.    Genitourinary: Negative.    Musculoskeletal: Negative.    Allergic/Immunologic: Negative.    Neurological: Negative.    Hematological: Negative.    Psychiatric/Behavioral: Negative.          Past Medical History:   Diagnosis Date   • Back pain     2005 had ruptured disc no surgery so prn c/o pain   • Breast cancer (HCC) 03/09/2022    Right breast invasive ductal adenocarcinoma, ER/[place] positive, HER2 negative   • Carotid artery stenosis    • Cervical cancer (HCC)    • Colon polyp 08/20/2021    Rectum: hyperplastic polyp   • Diverticulosis    • Fibromuscular dysplasia (HCC)     dx 2 yrs ago   • H/O bronchitis    • Hyperlipidemia    • Loose stools    • Osteoporosis    • Positive colorectal cancer screening using Cologuard test    • Psoriasis          Past Surgical History:   Procedure Laterality Date   • BREAST BIOPSY Right 03/09/2022    US guided mammotome vaccum assisted right breast biopsy-Dr. Julián Palacios, [facility]   • BREAST LUMPECTOMY WITH SENTINEL NODE BIOPSY Right 04/28/2022    Procedure: RIGHT BREAST WIRE LOCALIZED LUMPECTOMY WITH SENTINEL NODE BIOPSY;  Surgeon: Allie Bah MD;  Location: [facility] [place] [facility];  Service: General;  Laterality: Right;   • COLONOSCOPY N/A 08/20/2021    Procedure: COLONOSCOPY INTO CECUM AND TI WITH COLD BX POLYPECTOMIES;  Surgeon: Catia Kate MD;  Location: [facility] ENDOSCOPY;  Service: Gastroenterology;  Laterality: N/A;  PRE: POSITIVE COLOGUARD  POST: DIVERTICULOSIS, POLYPS, HEMORRHOIDS   • [place] THROMBOENDARTECTMY NECK,NECK INCIS Left 09/14/2016    Procedure: LT CAROTID ENDARTERECTOMY WITH INTRA OPERATIVE CAROTID DUPLEX SCAN;  Surgeon: Arnulfo Hernnadez MD;  Location: [facility] MAIN OR;  Service: Vascular   • SUBTOTAL HYSTERECTOMY Bilateral 09/1987    done at [facility], ovaries still in tact         Social History     Socioeconomic History   • Marital status: Single   Tobacco Use   • Smoking status: Current Every Day Smoker     Packs/day: 1.00     Years:  40.00     Pack years: 40.00     Types: Cigarettes     Start date: 1/1/1982   • Smokeless tobacco: Never Used   • Tobacco comment: 1/2 PPD   Vaping Use   • Vaping Use: Never used   Substance and Sexual Activity   • Alcohol use: Yes     Alcohol/week: 6.0 standard drinks     Types: 2 Glasses of wine, 4 Cans of beer per week   • Drug use: Never   • Sexual activity: Not Currently         Family History   Problem Relation Age of Onset   • Vision loss Mother    • Skin cancer Father    • Skin cancer Sister    • Depression Daughter    • Malig Hyperthermia Neg Hx    • Breast cancer Neg Hx    • Ovarian cancer Neg Hx           Objective    Physical Exam  Constitutional:       Appearance: Normal appearance.   HENT:      Head: Atraumatic.   Eyes:      Extraocular Movements: Extraocular movements intact.   Chest:          Comments: Hematoma upper central right breast, moderate erythema over entire right breast, no palpable masses in left breast  Abdominal:      General: Abdomen is flat.   Musculoskeletal:         General: Normal range of motion.   Neurological:      General: No focal deficit present.      Mental Status: She is alert.   Psychiatric:         Mood and Affect: Mood normal.         Behavior: Behavior normal.           Current Outpatient Medications on File Prior to Visit   Medication Sig Dispense Refill   • aspirin 81 MG EC tablet Take 81 mg by mouth Daily.     • atorvastatin (LIPITOR) 20 MG tablet Take 1 tablet by mouth Every Night. (Patient taking differently: Take 20 mg by mouth Every Night. Pharmacist said To call MD surgeon about telling pt to stop prior to surgery) 90 tablet 3   • Calcium Carbonate (CALCIUM 500 PO) Take 1 tablet by mouth Daily.     • Cholecalciferol (Vitamin D) 50 MCG (2000 UT) capsule Take 2,000 Units by mouth Daily.       No current facility-administered medications on file prior to visit.       ALLERGIES:  No Known Allergies    There were no vitals taken for this visit.     (0) Fully active,  able to carry on all predisease performance without restriction  Current Status 5/26/2022   ECOG score 0         Assessment & Plan     67 year old female with pT1aN0 right breast cancer, ERPR pos Her 2 negative.  I discussed with her my recommendation for post-operative radiation therapy to the right breast to decrease the risk of local recurrence.      I discussed with her in detail the risks, benefits and rationale of radiation therapy to the right breast to include but not limited to the following:    Acute: skin erythema, breakdown/moist desquamation, swelling or discomfort of the breast, fatigue, pneumonitis resulting in shortness of breath, cough or pain    Late: Permanent skin changes including hyperpigmentation, telangiectasias, fibrosis of the breast resulting in smaller size or poor cosmetic outcome, late edema or cellulitis, late rib fracture, late pulmonary fibrosis and the remote risk of second malignancies.      She voiced understanding and was given an opportunity to ask questions which I believe were answered to her satisfaction.  I have scheduled her to return for re-evaluation and CT simulation for treatment planning on Monday June 27 per her request, after a vacation. I plan to treat the right breast to a dose of approximately 3990cGy in 15 fractions followed by a boost to the tumor bed for an additional 1000cGy in 5 fractions.    I personally spent greater than 60 minutes today assessing, managing, discussing and documenting my visit with the patient. That time includes review of records, imaging and pathology reports, obtaining my own history, performing a medically appropriate evaluation, counseling and educating the patient, discussing goals, logistics, alternatives and risks of my recommendations, surveillance options and potential outcomes. It also includes the time documenting the clinical information in the EMR and communicating my recommendations to the other involved physicians.                  Thank you very much for allowing me to participate in the care of this very pleasant patient.    Sincerely,      Linda Delvalle MD

## 2022-06-27 ENCOUNTER — OFFICE VISIT (OUTPATIENT)
Dept: RADIATION ONCOLOGY | Facility: HOSPITAL | Age: 67
End: 2022-06-27

## 2022-06-27 ENCOUNTER — OFFICE VISIT (OUTPATIENT)
Dept: ONCOLOGY | Facility: CLINIC | Age: 67
End: 2022-06-27

## 2022-06-27 ENCOUNTER — APPOINTMENT (OUTPATIENT)
Dept: RADIATION ONCOLOGY | Facility: HOSPITAL | Age: 67
End: 2022-06-27

## 2022-06-27 ENCOUNTER — LAB (OUTPATIENT)
Dept: LAB | Facility: HOSPITAL | Age: 67
End: 2022-06-27

## 2022-06-27 VITALS
WEIGHT: 121.2 LBS | BODY MASS INDEX: 20.19 KG/M2 | OXYGEN SATURATION: 97 % | DIASTOLIC BLOOD PRESSURE: 77 MMHG | HEART RATE: 66 BPM | TEMPERATURE: 96.6 F | SYSTOLIC BLOOD PRESSURE: 173 MMHG | HEIGHT: 65 IN | RESPIRATION RATE: 16 BRPM

## 2022-06-27 VITALS
RESPIRATION RATE: 20 BRPM | HEART RATE: 78 BPM | OXYGEN SATURATION: 100 % | BODY MASS INDEX: 20.29 KG/M2 | DIASTOLIC BLOOD PRESSURE: 84 MMHG | SYSTOLIC BLOOD PRESSURE: 173 MMHG | WEIGHT: 121.8 LBS

## 2022-06-27 DIAGNOSIS — C50.011 MALIGNANT NEOPLASM OF NIPPLE OF RIGHT BREAST IN FEMALE, ESTROGEN RECEPTOR POSITIVE: Primary | ICD-10-CM

## 2022-06-27 DIAGNOSIS — Z17.0 MALIGNANT NEOPLASM OF NIPPLE OF RIGHT BREAST IN FEMALE, ESTROGEN RECEPTOR POSITIVE: Primary | ICD-10-CM

## 2022-06-27 DIAGNOSIS — C50.911 MALIGNANT NEOPLASM OF RIGHT BREAST IN FEMALE, ESTROGEN RECEPTOR POSITIVE, UNSPECIFIED SITE OF BREAST: Primary | ICD-10-CM

## 2022-06-27 DIAGNOSIS — Z17.0 MALIGNANT NEOPLASM OF RIGHT BREAST IN FEMALE, ESTROGEN RECEPTOR POSITIVE, UNSPECIFIED SITE OF BREAST: Primary | ICD-10-CM

## 2022-06-27 DIAGNOSIS — C50.919 MALIGNANT NEOPLASM OF FEMALE BREAST, UNSPECIFIED ESTROGEN RECEPTOR STATUS, UNSPECIFIED LATERALITY, UNSPECIFIED SITE OF BREAST: Primary | ICD-10-CM

## 2022-06-27 DIAGNOSIS — M81.0 OSTEOPOROSIS, UNSPECIFIED OSTEOPOROSIS TYPE, UNSPECIFIED PATHOLOGICAL FRACTURE PRESENCE: ICD-10-CM

## 2022-06-27 LAB
BASOPHILS # BLD AUTO: 0.04 10*3/MM3 (ref 0–0.2)
BASOPHILS NFR BLD AUTO: 0.5 % (ref 0–1.5)
DEPRECATED RDW RBC AUTO: 45.6 FL (ref 37–54)
EOSINOPHIL # BLD AUTO: 0.08 10*3/MM3 (ref 0–0.4)
EOSINOPHIL NFR BLD AUTO: 0.9 % (ref 0.3–6.2)
ERYTHROCYTE [DISTWIDTH] IN BLOOD BY AUTOMATED COUNT: 13.4 % (ref 12.3–15.4)
HCT VFR BLD AUTO: 44.5 % (ref 34–46.6)
HGB BLD-MCNC: 15.3 G/DL (ref 12–15.9)
IMM GRANULOCYTES # BLD AUTO: 0.04 10*3/MM3 (ref 0–0.05)
IMM GRANULOCYTES NFR BLD AUTO: 0.5 % (ref 0–0.5)
LYMPHOCYTES # BLD AUTO: 2.41 10*3/MM3 (ref 0.7–3.1)
LYMPHOCYTES NFR BLD AUTO: 27.7 % (ref 19.6–45.3)
MCH RBC QN AUTO: 31.6 PG (ref 26.6–33)
MCHC RBC AUTO-ENTMCNC: 34.4 G/DL (ref 31.5–35.7)
MCV RBC AUTO: 91.9 FL (ref 79–97)
MONOCYTES # BLD AUTO: 0.8 10*3/MM3 (ref 0.1–0.9)
MONOCYTES NFR BLD AUTO: 9.2 % (ref 5–12)
NEUTROPHILS NFR BLD AUTO: 5.34 10*3/MM3 (ref 1.7–7)
NEUTROPHILS NFR BLD AUTO: 61.2 % (ref 42.7–76)
NRBC BLD AUTO-RTO: 0 /100 WBC (ref 0–0.2)
PLATELET # BLD AUTO: 141 10*3/MM3 (ref 140–450)
PMV BLD AUTO: 12 FL (ref 6–12)
RBC # BLD AUTO: 4.84 10*6/MM3 (ref 3.77–5.28)
WBC NRBC COR # BLD: 8.71 10*3/MM3 (ref 3.4–10.8)

## 2022-06-27 PROCEDURE — 99212 OFFICE O/P EST SF 10 MIN: CPT | Performed by: RADIOLOGY

## 2022-06-27 PROCEDURE — 85025 COMPLETE CBC W/AUTO DIFF WBC: CPT

## 2022-06-27 PROCEDURE — 99214 OFFICE O/P EST MOD 30 MIN: CPT | Performed by: INTERNAL MEDICINE

## 2022-06-27 PROCEDURE — G0463 HOSPITAL OUTPT CLINIC VISIT: HCPCS

## 2022-06-27 PROCEDURE — 77263 THER RADIOLOGY TX PLNG CPLX: CPT | Performed by: RADIOLOGY

## 2022-06-27 PROCEDURE — 36415 COLL VENOUS BLD VENIPUNCTURE: CPT

## 2022-06-27 RX ORDER — TAMOXIFEN CITRATE 20 MG/1
20 TABLET ORAL DAILY
Qty: 30 TABLET | Refills: 5 | Status: SHIPPED | OUTPATIENT
Start: 2022-06-27 | End: 2022-07-27

## 2022-06-27 NOTE — PROGRESS NOTES
Subjective     Allie Bah MD    Cancer Staging  Stage IA (pT1a, pN0(sn), cM0, G2, ER+, WV+, HER2-)   No chief complaint on file.       CC: right breast cancer, pT1aN0 ER WV pos Her 2 neg                              Dear Allie Bah MD     I had the pleasure of seeing Arline Zaragoza  today [facility].   The patient is a 67 y.o. female with recently diagnosed right breast cancer.  She had a screening mammogram on 22 which showed an area of architectural distortion upper central right breast.  She had a diagnostic right mammogram and us on 2/15/22 which confirmed an area of architectural distortion upper central posteriro right breast which corresponds to vague hypoechoic area at 12 oclock.  She underwent a right breast biopsy on 3/22 which revealed invasive ductal carcinoma, grade 2, 6.5mm ER Pos WV pos Her 2 neg ki67 3%.     She had a breast mri on 3/16/22 which showed biopsy proven malignancy right breast posterior one thrid at 12 oclock 1.1cm in size with clip.     She underwent a right breast lumpectomy and sentinel node biopsy on 22 with pathology revealing invasive ductal carcinoma, grade II, 5mm in size no dcis no lvsi,.  The original inferior margin was 2.4mm from invasive component and 1mm from the anterior margin.  Seven nodes were negative.  ER 99%PR25% Her 2 neg and ki67 3%.  Her pathologic stage was pT1aN0.  Additional inferior margin was negative by 6mm.       She is  with menarche age 15 and first childbirth age 28.  She did not breast feed and went through menopause age 45.  She used oral contraceptives for 2 years and has never used hormone replacement therapy.      She met with medical oncology on 22 Dr. Desai.  Patient's Oncotype DX  shows a score of 20.  She has recommended arimidex.     She is referred today for evaluation for adjuvant radiation.     Interval hx 22 :   She returns today for re-evaluation and possible ct simulation.  She thinks the  seroma has decreased in size, however, she has developed a scab in the central portion of her incision.     Review of Systems   Constitutional: Negative.    Skin: Positive for color change.         Past Medical History:   Diagnosis Date   • Back pain     2005 had ruptured disc no surgery so prn c/o pain   • Breast cancer (HCC) 03/09/2022    Right breast invasive ductal adenocarcinoma, ER/ME positive, HER2 negative   • Carotid artery stenosis    • Cervical cancer (HCC)    • Colon polyp 08/20/2021    Rectum: hyperplastic polyp   • Diverticulosis    • Fibromuscular dysplasia (HCC)     dx 2 yrs ago   • H/O bronchitis    • Hyperlipidemia    • Loose stools    • Osteoporosis    • Positive colorectal cancer screening using Cologuard test    • Psoriasis          Past Surgical History:   Procedure Laterality Date   • BREAST BIOPSY Right 03/09/2022    US guided mammotome vaccum assisted right breast biopsy-Dr. Julián Palacios, [facility]   • BREAST LUMPECTOMY WITH SENTINEL NODE BIOPSY Right 04/28/2022    Procedure: RIGHT BREAST WIRE LOCALIZED LUMPECTOMY WITH SENTINEL NODE BIOPSY;  Surgeon: Allie Bah MD;  Location: [facility] OR [facility];  Service: General;  Laterality: Right;   • COLONOSCOPY N/A 08/20/2021    Procedure: COLONOSCOPY INTO CECUM AND TI WITH COLD BX POLYPECTOMIES;  Surgeon: Catia Kate MD;  Location: [facility] ENDOSCOPY;  Service: Gastroenterology;  Laterality: N/A;  PRE: POSITIVE COLOGUARD  POST: DIVERTICULOSIS, POLYPS, HEMORRHOIDS   • ME THROMBOENDARTECTMY NECK,NECK INCIS Left 09/14/2016    Procedure: LT CAROTID ENDARTERECTOMY WITH INTRA OPERATIVE CAROTID DUPLEX SCAN;  Surgeon: Arnulfo Hernandez MD;  Location: [facility] MAIN OR;  Service: Vascular   • SUBTOTAL HYSTERECTOMY Bilateral 09/1987    done at [facility], ovaries still in tact         Social History     Socioeconomic History   • Marital status: Single   Tobacco Use   • Smoking status: Current Every Day Smoker     Packs/day: 1.00     Years: 40.00     Pack years: 40.00      Types: Cigarettes     Start date: 1/1/1982   • Smokeless tobacco: Never Used   • Tobacco comment: 1/2 PPD   Vaping Use   • Vaping Use: Never used   Substance and Sexual Activity   • Alcohol use: Yes     Alcohol/week: 6.0 standard drinks     Types: 2 Glasses of wine, 4 Cans of beer per week   • Drug use: Never   • Sexual activity: Not Currently         Family History   Problem Relation Age of Onset   • Vision loss Mother    • Skin cancer Father    • Skin cancer Sister    • Depression Daughter    • Malig Hyperthermia Neg Hx    • Breast cancer Neg Hx    • Ovarian cancer Neg Hx           Objective    Physical Exam  Constitutional:       Appearance: Normal appearance.   Chest:          Comments: 2cm area of scabbed/dried blood central incision, seroma much smaller  Neurological:      Mental Status: She is alert.           Current Outpatient Medications on File Prior to Visit   Medication Sig Dispense Refill   • aspirin 81 MG EC tablet Take 81 mg by mouth Daily.     • atorvastatin (LIPITOR) 20 MG tablet Take 1 tablet by mouth Every Night. (Patient taking differently: Take 20 mg by mouth Every Night. Pharmacist said To call MD surgeon about telling pt to stop prior to surgery) 90 tablet 3   • Calcium Carbonate (CALCIUM 500 PO) Take 1 tablet by mouth Daily.     • Cholecalciferol (Vitamin D) 50 MCG (2000 UT) capsule Take 2,000 Units by mouth Daily.       No current facility-administered medications on file prior to visit.       ALLERGIES:  No Known Allergies    There were no vitals taken for this visit.     Current Status 5/26/2022   ECOG score 0         Assessment & Plan      67 year old female with pT1aN0 right breast cancer, ERPR pos Her 2 negative. Her seroma is smaller but she has a large scab in the central portion of scar/incision.  I gave her samples of aquaphor today and instructed her to put it on twice daily as well as gently was the area with cetaphil soap and hopefully it should fall off in the next week.  I  have scheduled her to return on July 11 for re-eval and simulation.  She is also seeing her surgeon Dr. Bah on July 1.       I personally spent greater than 18 minutes today assessing, managing, discussing and documenting my visit with the patient. That time includes review of records, imaging and pathology reports, obtaining my own history, performing a medically appropriate evaluation, counseling and educating the patient, discussing goals, logistics, alternatives and risks of my recommendations, surveillance options and potential outcomes. It also includes the time documenting the clinical information in the EMR and communicating my recommendations to the other involved physicians.                  Thank you very much for allowing me to participate in the care of this very pleasant patient.    Sincerely,      Linda Delvalle MD

## 2022-06-27 NOTE — PROGRESS NOTES
Subjective     REASON FOR CONSULTATION:  . T1a/T1BN0 invasive ductal carcinoma of the right breast, 12 o'clock position, grade 2 with a [place] score of 6 with tumor size of 5 x 5 x 5 mm, on biopsy was 6.5 mm, margins were uninvolved by invasive carcinoma.  7 lymph nodes were negative.  ER 99%, KY 25%, HER2/sanchez 1+ negative, Ki-67 3%.  Margins are clear.      HISTORY OF PRESENT ILLNESS:    Patient is a 67-year-old female recently diagnosed with a T1 a/T1b N0 invasive ductal carcinoma of the right breast grade 2, ER/KY positive HER2/sanchez negative.     Her bone density showed evidence of osteoporosis.  We had discussed aromatase inhibitor with Prolia.  However she is not keen on taking Prolia and now is agreeable for tamoxifen.  She does take a baby aspirin.  She has never had blood clots and never had depression.  We did discuss in length side effects of tamoxifen today and she is agreeable to start tamoxifen as she does not want to take anything that can affect her bones.    Oncologic history:  Patient is a 67-year-old female who was found to have an abnormality seen on the screening mammogram.  She had skipped mammograms for many years.  Her most recent mammogram was at least 10 years ago.  She denied having any masses that was palpable.    Details are as follows.    January 17, 2022:  FINDINGS:  The breasts are heterogeneously dense, which may obscure small masses.     There is an area of architectural distortion in the upper inner  posterior right breast, which is best appreciated on Tomosynthesis and  on the MLO view. There are no suspicious masses, calcifications, or  areas of architectural distortion in the left breast.    IMPRESSION/RECOMMENDATION(S):  Right breast architectural distortion. Recommend further evaluation with  CC and MLO spot compression and lateral views with Tomosynthesis and  targeted ultrasound.    February 15, 2022:  Right breast diagnostic mammogram and ultrasound  There is a  persistent area of architectural distortion in the upper  central posterior right breast, best appreciated with Tomosynthesis.  This area was further assessed with ultrasound.     ULTRASOUND:  Targeted sonographic evaluation of the right breast was performed from  12:00 to 1:00 in the region of the mammographic abnormality. At 11:30, 4  cm from the nipple, there is an incidental 0.6 x 0.3 x 0.7 cm  benign-appearing cluster of cysts. At 12:00, 4 cm from the nipple, there  is a vague irregular hypoechoic mass/area of shadowing measuring 0.7 x  0.6 x 0.6 cm in the region of the mammographic distortion.        IMPRESSION:  Suspicious 0.7 cm mass/area of shadowing at 12:00 in the right breast.  Recommend further evaluation with ultrasound-guided core needle biopsy  and clip correlation with post biopsy mammogram. If the biopsy clip does  not correspond to the area of mammographic architectural distortion in  the upper central posterior right breast, further evaluation with  stereotactic/Tomosynthesis guided core needle biopsy would then be  recommended.    March 9, 2022: Ultrasound-guided right breast biopsy at 12:00 showed    Breast, Right, 12:00 o'clock, 4 cm from Nipple, Biopsy:  A. Invasive ductal adenocarcinoma, Latonya grade II (tubular grade=3, nuclear grade=2, mitotic grade=1).  B. Microcalcifications are associated with the invasive tumor and non-neoplastic tissue.  C. Ductal carcinoma in-situ is not identified.  D. The tumor measures up to 6.5 mm.    ER: 99%  RI: 25%  HER2/sanchez: 1+ negative  Ki-67 3%    March 17, 2022: MRI of the bilateral breast    IMPRESSION:  1. Biopsy-proven malignancy in the right breast in the posterior  one-third at the 12-o'clock position measuring on the order of 1.1 cm in  greatest dimension with the bowtie-shaped metallic clip positioned on  the order of 0.7 cm superior and slightly medial to the epicenter of the  suspected residual malignancy within a hematoma cavity that measures  on  the order of 2 cm in greatest dimension. No other suspicious findings  are seen within the right breast and there is no evidence for right  axillary adenopathy.  2. There are no findings suspicious for malignancy in the left breast.     April 28, 2022: Right breast lumpectomy with sentinel lymph node surgery    Synoptic Checklist  INVASIVE CARCINOMA OF THE BREAST: Resection (INVASIVE CARCINOMA OF THE BREAST: COMPLETE EXCISION - All Specimens)  Procedure Excision (less than total mastectomy)  Specimen Laterality Right  .  TUMOR  Tumor Site Clock position  12 o'clock  Histologic Type Invasive carcinoma of no special type (ductal)  Histologic Grade ([place] Histologic Score)  Glandular (Acinar) / Tubular Differentiation Score 3  Nuclear Pleomorphism  .  1. Breast, Right.  Mitotic Rate Score 1  Overall Grade Grade 2 (scores of 6 or 7)  Tumor Size Greatest dimension of largest invasive focus (Millimeters): 5 mm  Additional Dimension (Millimeters) 5 mm  5 mm  Tumor Focality Single focus of invasive carcinoma  Ductal Carcinoma In Situ (DCIS) Not identified  Lobular Carcinoma In Situ (LCIS) Not identified  Lymphovascular Invasion Not identified  Dermal Lymphovascular Invasion No skin present  Microcalcifications Present in invasive carcinoma  Present in non-neoplastic tissue  Treatment Effect in the Breast No known presurgical therapy  .  MARGINS  Margin Status for Invasive Carcinoma All margins negative for invasive carcinoma  Distance from Invasive Carcinoma to Closest Margin 8.4 mm  Closest Margin(s) to Invasive Carcinoma Inferior  Distance from Invasive Carcinoma to Anterior Margin 11 mm  Distance from Invasive Carcinoma to Posterior Margin 18 mm  Distance from Invasive Carcinoma to Superior Margin 17 mm  Distance from Invasive Carcinoma to Inferior Margin 8.4 mm  Distance from Invasive Carcinoma to Medial Margin 11 mm  Distance from Invasive Carcinoma to Lateral Margin 22 mm  .  REGIONAL LYMPH NODES  Regional  Lymph Node Status All regional lymph nodes negative for tumor  Total Number of Lymph Nodes Examined (sentinel and non-sentinel) 7  Number of [place] Nodes Examined 7  .      Past Medical History:   Diagnosis Date   • Back pain     2005 had ruptured disc no surgery so prn c/o pain   • Breast cancer (HCC) 03/09/2022    Right breast invasive ductal adenocarcinoma, ER/AK positive, HER2 negative   • Carotid artery stenosis    • Cervical cancer (HCC)    • Colon polyp 08/20/2021    Rectum: hyperplastic polyp   • Diverticulosis    • Fibromuscular dysplasia (HCC)     dx 2 yrs ago   • H/O bronchitis    • Hyperlipidemia    • Loose stools    • Osteoporosis    • Positive colorectal cancer screening using Cologuard test    • Psoriasis         Past Surgical History:   Procedure Laterality Date   • BREAST BIOPSY Right 03/09/2022    US guided mammotome vaccum assisted right breast biopsy-Dr. Julián Palacios, [facility]   • BREAST LUMPECTOMY WITH SENTINEL NODE BIOPSY Right 04/28/2022    Procedure: RIGHT BREAST WIRE LOCALIZED LUMPECTOMY WITH SENTINEL NODE BIOPSY;  Surgeon: Allie Bah MD;  Location: [facility] OR [facility] [place];  Service: General;  Laterality: Right;   • COLONOSCOPY N/A 08/20/2021    Procedure: COLONOSCOPY INTO CECUM AND TI WITH COLD BX POLYPECTOMIES;  Surgeon: Catia Kate MD;  Location: [facility] ENDOSCOPY;  Service: Gastroenterology;  Laterality: N/A;  PRE: POSITIVE COLOGUARD  POST: DIVERTICULOSIS, POLYPS, HEMORRHOIDS   • AK THROMBOENDARTECTMY NECK,NECK INCIS Left 09/14/2016    Procedure: LT CAROTID ENDARTERECTOMY WITH INTRA OPERATIVE CAROTID DUPLEX SCAN;  Surgeon: Arnulfo Hernandez MD;  Location: [facility] OR;  Service: Vascular   • SUBTOTAL HYSTERECTOMY Bilateral 09/1987    done at [facility], ovaries still in tact        Current Outpatient Medications on File Prior to Visit   Medication Sig Dispense Refill   • aspirin 81 MG EC tablet Take 81 mg by mouth Daily.     • atorvastatin (LIPITOR) 20 MG tablet Take 1 tablet by mouth  Every Night. (Patient taking differently: Take 20 mg by mouth Every Night. Pharmacist said To call MD surgeon about telling pt to stop prior to surgery) 90 tablet 3   • Calcium Carbonate (CALCIUM 500 PO) Take 1 tablet by mouth Daily.     • Cholecalciferol (Vitamin D) 50 MCG (2000 UT) capsule Take 2,000 Units by mouth Daily.       No current facility-administered medications on file prior to visit.        ALLERGIES:  No Known Allergies     Social History     Socioeconomic History   • Marital status: Single   Tobacco Use   • Smoking status: Current Every Day Smoker     Packs/day: 1.00     Years: 40.00     Pack years: 40.00     Types: Cigarettes     Start date: 1982   • Smokeless tobacco: Never Used   • Tobacco comment:  PPD   Vaping Use   • Vaping Use: Never used   Substance and Sexual Activity   • Alcohol use: Yes     Alcohol/week: 6.0 standard drinks     Types: 2 Glasses of wine, 4 Cans of beer per week   • Drug use: Never   • Sexual activity: Not Currently        Family History   Problem Relation Age of Onset   • Vision loss Mother    • Skin cancer Father    • Skin cancer Sister    • Depression Daughter    • Malig Hyperthermia Neg Hx    • Breast cancer Neg Hx    • Ovarian cancer Neg Hx       OB/GYN history:  Age of menarche: 15  Age of menopause: 45  Age of first childbirth 28   2 para 2 no miscarriages  Total number of years of contraceptive use: 2 years  No hormone replacement therapy given    Family history: Negative for breast or ovarian cancer.  Patient had cervical cancer when she was very young and had to undergo hysterectomy.    Review of Systems   Constitutional: Negative for appetite change, chills, diaphoresis, fatigue, fever and unexpected weight change.   HENT: Negative for hearing loss, sore throat and trouble swallowing.    Respiratory: Negative for cough, chest tightness, shortness of breath and wheezing.    Cardiovascular: Negative for chest pain, palpitations and leg swelling.  "  Gastrointestinal: Negative for abdominal distention, abdominal pain, constipation, diarrhea, nausea and vomiting.   Genitourinary: Negative for dysuria, frequency, hematuria and urgency.   Musculoskeletal: Negative for joint swelling.        No muscle weakness.   Skin: Negative for rash and wound.   Neurological: Negative for seizures, syncope, speech difficulty, weakness, numbness and headaches.   Hematological: Negative for adenopathy. Does not bruise/bleed easily.   Psychiatric/Behavioral: Negative for behavioral problems, confusion and suicidal ideas.   All other systems reviewed and are negative.       Objective     Vitals:    06/27/22 1059   BP: 173/77   Pulse: 66   Resp: 16   Temp: 96.6 °F (35.9 °C)   TempSrc: Temporal   SpO2: 97%   Weight: 55 kg (121 lb 3.2 oz)   Height: 165 cm (64.96\")   PainSc: 0-No pain     Current Status 5/26/2022   ECOG score 0       Physical Exam         This patient's ACP documentation is up to date, and there's nothing further left to document.      CONSTITUTIONAL:  Vital signs reviewed.  No distress, looks comfortable.  RESPIRATORY:  Normal respiratory effort.  Lungs clear to auscultation bilaterally.  CARDIOVASCULAR:  Normal S1, S2.  No murmurs rubs or gallops.  No significant lower extremity edema.  GASTROINTESTINAL: Abdomen appears unremarkable.  Nontender.  No hepatomegaly.  No splenomegaly.  LYMPHATIC:  No cervical, supraclavicular, axillary lymphadenopathy.  SKIN:  Warm.  No rashes.  PSYCHIATRIC:  Normal judgment and insight.  Normal mood and affect.    RECENT LABS:  Hematology WBC   Date Value Ref Range Status   06/27/2022 8.71 3.40 - 10.80 10*3/mm3 Final     RBC   Date Value Ref Range Status   06/27/2022 4.84 3.77 - 5.28 10*6/mm3 Final     Hemoglobin   Date Value Ref Range Status   06/27/2022 15.3 12.0 - 15.9 g/dL Final     Hematocrit   Date Value Ref Range Status   06/27/2022 44.5 34.0 - 46.6 % Final     Platelets   Date Value Ref Range Status   06/27/2022 141 140 - 450 " 10*3/mm3 Final          Assessment & Plan       1. T1a/T1BN0 invasive ductal carcinoma of the right breast, 12 o'clock position, grade 2 with a [place] score of 6 with tumor size of 5 x 5 x 5 mm, on biopsy was 6.5 mm, margins were uninvolved by invasive carcinoma.  7 lymph nodes were negative.  ER 99%, AR 25%, HER2/sanchez 1+ negative, Ki-67 3%.  Margins are clear.  · Patient's Oncotype DX  shows a score of 20  · Given the patient is highly ER/AR positive HER2 negative and a small tumor we can consider endocrine therapy alone with Oncotype DX score of 20.  · Her benefit from chemotherapy is very minimal and hence we discussed about endocrine therapy  · We had discussed aromatase inhibitor with Prolia versus tamoxifen.  · Bone density does show osteoporosis and she is leaning towards tamoxifen as she does not want do anything that can affect the bones.  · Discussed in length side effects of tamoxifen.   · Patient is to start radiation starting July 11. 2022  · Patient is to start tamoxifen towards the end of radiation        2.  Osteoporosis, patient has not had bone density for some time  Bone density done January 17, 2022 did show osteoporosis    · Will start calcium and vitamin D supplementation  · We discussed about consideration of starting Prolia but patient refuses Prolia.  She also has gum issues and she has to have many of the teeth pulled.    3.  Family history of cancer: No family history of breast or ovarian cancer.  Patient had history of cervical cancer when she was [age] years  · .  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  · Clinical significance of the variant is uncertain.  Familial variation of uncertain significance testing is not recommended.    Plan  · Patient seen by radiation oncology Dr. Linda Matthews with plans to start radiation as of July 11/2022  · Reviewed DEXA scan which shows osteoporosis  · Patient refuses to go on Prolia as she has issues with her gums and  needs to have tooth pulled  · She is agreeable to try tamoxifen.  She does not have any medications interacting tamoxifen and have educated her in length the side effects of tamoxifen today.  She has never had depression nor any DVTs.  She currently is taking a baby aspirin a day  · We will plan to bring her back in 3 months with labs    MD Dr. Glenys Stanford Dr.

## 2022-06-28 ENCOUNTER — APPOINTMENT (OUTPATIENT)
Dept: LAB | Facility: HOSPITAL | Age: 67
End: 2022-06-28

## 2022-07-11 ENCOUNTER — OFFICE VISIT (OUTPATIENT)
Dept: RADIATION ONCOLOGY | Facility: HOSPITAL | Age: 67
End: 2022-07-11

## 2022-07-11 ENCOUNTER — APPOINTMENT (OUTPATIENT)
Dept: RADIATION ONCOLOGY | Facility: HOSPITAL | Age: 67
End: 2022-07-11

## 2022-07-11 VITALS
SYSTOLIC BLOOD PRESSURE: 147 MMHG | HEART RATE: 77 BPM | OXYGEN SATURATION: 98 % | RESPIRATION RATE: 20 BRPM | DIASTOLIC BLOOD PRESSURE: 76 MMHG | WEIGHT: 123 LBS | BODY MASS INDEX: 20.49 KG/M2

## 2022-07-11 DIAGNOSIS — C50.911 MALIGNANT NEOPLASM OF RIGHT BREAST IN FEMALE, ESTROGEN RECEPTOR POSITIVE, UNSPECIFIED SITE OF BREAST: Primary | ICD-10-CM

## 2022-07-11 DIAGNOSIS — Z17.0 MALIGNANT NEOPLASM OF RIGHT BREAST IN FEMALE, ESTROGEN RECEPTOR POSITIVE, UNSPECIFIED SITE OF BREAST: Primary | ICD-10-CM

## 2022-07-11 PROCEDURE — 77333 RADIATION TREATMENT AID(S): CPT | Performed by: RADIOLOGY

## 2022-07-11 PROCEDURE — 77290 THER RAD SIMULAJ FIELD CPLX: CPT | Performed by: RADIOLOGY

## 2022-07-11 PROCEDURE — 99214 OFFICE O/P EST MOD 30 MIN: CPT | Performed by: RADIOLOGY

## 2022-07-11 NOTE — PROGRESS NOTES
Subjective     No ref. provider found    Cancer Staging  Stage IA (pT1a, pN0(sn), cM0, G2, ER+, OK+, HER2-)   No chief complaint on file.     CC: right breast cancer, pT1aN0 ER OK pos Her 2 neg                              Dear Allie Bah MD     I had the pleasure of seeing Arline Zaragoza  [facility].   The patient is a 67 y.o. female with recently diagnosed right breast cancer.  She had a screening mammogram on 22 which showed an area of architectural distortion upper central right breast.  She had a diagnostic right mammogram and us on 2/15/22 which confirmed an area of architectural distortion upper central posteriro right breast which corresponds to vague hypoechoic area at 12 oclock.  She underwent a right breast biopsy on 3/22 which revealed invasive ductal carcinoma, grade 2, 6.5mm ER Pos OK pos Her 2 neg ki67 3%.     She had a breast mri on 3/16/22 which showed biopsy proven malignancy right breast posterior one thrid at 12 oclock 1.1cm in size with clip.     She underwent a right breast lumpectomy and sentinel node biopsy on 22 with pathology revealing invasive ductal carcinoma, grade II, 5mm in size no dcis no lvsi,.  The original inferior margin was 2.4mm from invasive component and 1mm from the anterior margin.  Seven nodes were negative.  ER 99%PR25% Her 2 neg and ki67 3%.  Her pathologic stage was pT1aN0.  Additional inferior margin was negative by 6mm.       She is  with menarche age 15 and first childbirth age 28.  She did not breast feed and went through menopause age 45.  She used oral contraceptives for 2 years and has never used hormone replacement therapy.      She met with medical oncology on 22 Dr. Desai.  Patient's Oncotype DX  shows a score of 20.  She has recommended arimidex.     She is referred today for evaluation for adjuvant radiation.      Interval hx 22 :   She returns today for re-evaluation and possible ct simulation.  She thinks  "the seroma has decreased in size, however, she has developed a scab in the central portion of her incision.    Interval hx 7/11/22:  She returns today for re-evaluation of her incision prior to simulation.  She states the \"scab\" over the incision is gone and she has no new complaints.            Review of Systems   Constitutional: Negative.    Musculoskeletal: Negative.    Skin: Negative.    Psychiatric/Behavioral: Negative.          Past Medical History:   Diagnosis Date   • Back pain     2005 had ruptured disc no surgery so prn c/o pain   • Breast cancer (HCC) 03/09/2022    Right breast invasive ductal adenocarcinoma, ER/[place] positive, HER2 negative   • Carotid artery stenosis    • Cervical cancer (HCC)    • Colon polyp 08/20/2021    Rectum: hyperplastic polyp   • Diverticulosis    • Fibromuscular dysplasia (HCC)     dx 2 yrs ago   • H/O bronchitis    • Hyperlipidemia    • Loose stools    • Osteoporosis    • Positive colorectal cancer screening using Cologuard test    • Psoriasis          Past Surgical History:   Procedure Laterality Date   • BREAST BIOPSY Right 03/09/2022    US guided mammotome vaccum assisted right breast biopsy-Dr. Julián Palacios, [facility]   • BREAST LUMPECTOMY WITH SENTINEL NODE BIOPSY Right 04/28/2022    Procedure: RIGHT BREAST WIRE LOCALIZED LUMPECTOMY WITH SENTINEL NODE BIOPSY;  Surgeon: Allie Bah MD;  [place] [facility] [place] [facility] [place]  Service: General;  Laterality: Right;   • COLONOSCOPY N/A 08/20/2021    Procedure: COLONOSCOPY INTO CECUM AND TI WITH COLD BX POLYPECTOMIES;  Surgeon: Catia Kate MD;  Location: [facility] ENDOSCOPY;  Service: Gastroenterology;  Laterality: N/A;  PRE: POSITIVE COLOGUARD  POST: DIVERTICULOSIS, POLYPS, HEMORRHOIDS   • AR THROMBOENDARTECTMY NECK,NECK INCIS Left 09/14/2016    Procedure: LT CAROTID ENDARTERECTOMY WITH INTRA OPERATIVE CAROTID DUPLEX SCAN;  Surgeon: Arnulfo Hernandez MD;  Location: [facility] MAIN OR;  Service: Vascular   • SUBTOTAL HYSTERECTOMY Bilateral " 09/1987    done at [facility], ovaries still in tact         Social History     Socioeconomic History   • Marital status: Single   Tobacco Use   • Smoking status: Current Every Day Smoker     Packs/day: 1.00     Years: 40.00     Pack years: 40.00     Types: Cigarettes     Start date: 1/1/1982   • Smokeless tobacco: Never Used   • Tobacco comment: 1/2 PPD   Vaping Use   • Vaping Use: Never used   Substance and Sexual Activity   • Alcohol use: Yes     Alcohol/week: 6.0 standard drinks     Types: 2 Glasses of wine, 4 Cans of beer per week   • Drug use: Never   • Sexual activity: Not Currently         Family History   Problem Relation Age of Onset   • Vision loss Mother    • Skin cancer Father    • Skin cancer Sister    • Depression Daughter    • Malig Hyperthermia Neg Hx    • Breast cancer Neg Hx    • Ovarian cancer Neg Hx           Objective    Physical Exam  Constitutional:       Appearance: Normal appearance.   Chest:          Comments: Incision well healed, no erythema, no palpable masses   Neurological:      Mental Status: She is alert.           Current Outpatient Medications on File Prior to Visit   Medication Sig Dispense Refill   • aspirin 81 MG EC tablet Take 81 mg by mouth Daily.     • atorvastatin (LIPITOR) 20 MG tablet Take 1 tablet by mouth Every Night. (Patient taking differently: Take 20 mg by mouth Every Night. Pharmacist said To call MD surgeon about telling pt to stop prior to surgery) 90 tablet 3   • Calcium Carbonate (CALCIUM 500 PO) Take 1 tablet by mouth Daily.     • Cholecalciferol (Vitamin D) 50 MCG (2000 UT) capsule Take 2,000 Units by mouth Daily.     • tamoxifen (NOLVADEX) 20 MG chemo tablet Take 1 tablet by mouth Daily for 30 days. 30 tablet 5     No current facility-administered medications on file prior to visit.       ALLERGIES:  No Known Allergies    There were no vitals taken for this visit.     Current Status 5/26/2022   ECOG score 0         Assessment & Plan     67 year old female with  pT1aN0 right breast cancer, ERPR pos Her 2 negative. Her seroma is smaller and her incision has completely healed, thus I think we can proceed with cT simulation today.          I reviewed with her again the risks, benefits and rationale of radiation therapy to the right breast to include but not limited to the following:     Acute: skin erythema, breakdown/moist desquamation, swelling or discomfort of the breast, fatigue, pneumonitis resulting in shortness of breath, cough or pain     Late: Permanent skin changes including hyperpigmentation, telangiectasias, fibrosis of the breast resulting in smaller size or poor cosmetic outcome, late edema or cellulitis, late rib fracture, late pulmonary fibrosis and the remote risk of second malignancies.       She voiced understanding and was given an opportunity to ask questions which I believe were answered to her satisfaction.  we will proceed with CT simulation for treatment planning today. I plan to treat the right breast to a dose of approximately 3990cGy in 15 fractions followed by a boost to the tumor bed for an additional 1000cGy in 5 fractions.    I personally spent greater than 20 minutes today assessing, managing, discussing and documenting my visit with the patient. That time includes review of records, imaging and pathology reports, obtaining my own history, performing a medically appropriate evaluation, counseling and educating the patient, discussing goals, logistics, alternatives and risks of my recommendations, surveillance options and potential outcomes. It also includes the time documenting the clinical information in the EMR and communicating my recommendations to the other involved physicians.                Thank you very much for allowing me to participate in the care of this very pleasant patient.    Sincerely,      Linda Delvalle MD

## 2022-07-13 PROCEDURE — 77300 RADIATION THERAPY DOSE PLAN: CPT | Performed by: RADIOLOGY

## 2022-07-13 PROCEDURE — 77334 RADIATION TREATMENT AID(S): CPT | Performed by: RADIOLOGY

## 2022-07-13 PROCEDURE — 77295 3-D RADIOTHERAPY PLAN: CPT | Performed by: RADIOLOGY

## 2022-07-14 LAB
RAD ONC ARIA COURSE ID: NORMAL
RAD ONC ARIA COURSE INTENT: NORMAL
RAD ONC ARIA COURSE LAST TREATMENT DATE: NORMAL
RAD ONC ARIA COURSE START DATE: NORMAL
RAD ONC ARIA COURSE TREATMENT ELAPSED DAYS: 0
RAD ONC ARIA FIRST TREATMENT DATE: NORMAL
RAD ONC ARIA PLAN FRACTIONS TREATED TO DATE: 1
RAD ONC ARIA PLAN ID: NORMAL
RAD ONC ARIA PLAN PRESCRIBED DOSE PER FRACTION: 2.66 GY
RAD ONC ARIA PLAN PRIMARY REFERENCE POINT: NORMAL
RAD ONC ARIA PLAN TOTAL FRACTIONS PRESCRIBED: 15
RAD ONC ARIA PLAN TOTAL PRESCRIBED DOSE: 3990 CGY
RAD ONC ARIA REFERENCE POINT DOSAGE GIVEN TO DATE: 2.66 GY
RAD ONC ARIA REFERENCE POINT DOSAGE GIVEN TO DATE: 2.66 GY
RAD ONC ARIA REFERENCE POINT ID: NORMAL
RAD ONC ARIA REFERENCE POINT ID: NORMAL
RAD ONC ARIA REFERENCE POINT SESSION DOSAGE GIVEN: 2.66 GY
RAD ONC ARIA REFERENCE POINT SESSION DOSAGE GIVEN: 2.66 GY

## 2022-07-14 PROCEDURE — 77280 THER RAD SIMULAJ FIELD SMPL: CPT | Performed by: RADIOLOGY

## 2022-07-14 PROCEDURE — 77412 RADIATION TX DELIVERY LVL 3: CPT | Performed by: RADIOLOGY

## 2022-07-14 PROCEDURE — 77427 RADIATION TX MANAGEMENT X5: CPT | Performed by: RADIOLOGY

## 2022-07-15 LAB
RAD ONC ARIA COURSE ID: NORMAL
RAD ONC ARIA COURSE INTENT: NORMAL
RAD ONC ARIA COURSE LAST TREATMENT DATE: NORMAL
RAD ONC ARIA COURSE START DATE: NORMAL
RAD ONC ARIA COURSE TREATMENT ELAPSED DAYS: 1
RAD ONC ARIA FIRST TREATMENT DATE: NORMAL
RAD ONC ARIA PLAN FRACTIONS TREATED TO DATE: 2
RAD ONC ARIA PLAN ID: NORMAL
RAD ONC ARIA PLAN PRESCRIBED DOSE PER FRACTION: 2.66 GY
RAD ONC ARIA PLAN PRIMARY REFERENCE POINT: NORMAL
RAD ONC ARIA PLAN TOTAL FRACTIONS PRESCRIBED: 15
RAD ONC ARIA PLAN TOTAL PRESCRIBED DOSE: 3990 CGY
RAD ONC ARIA REFERENCE POINT DOSAGE GIVEN TO DATE: 5.32 GY
RAD ONC ARIA REFERENCE POINT DOSAGE GIVEN TO DATE: 5.32 GY
RAD ONC ARIA REFERENCE POINT ID: NORMAL
RAD ONC ARIA REFERENCE POINT ID: NORMAL
RAD ONC ARIA REFERENCE POINT SESSION DOSAGE GIVEN: 2.66 GY
RAD ONC ARIA REFERENCE POINT SESSION DOSAGE GIVEN: 2.66 GY

## 2022-07-15 PROCEDURE — 77412 RADIATION TX DELIVERY LVL 3: CPT | Performed by: RADIOLOGY

## 2022-07-15 PROCEDURE — 77387 GUIDANCE FOR RADJ TX DLVR: CPT | Performed by: RADIOLOGY

## 2022-07-18 LAB
RAD ONC ARIA COURSE ID: NORMAL
RAD ONC ARIA COURSE INTENT: NORMAL
RAD ONC ARIA COURSE LAST TREATMENT DATE: NORMAL
RAD ONC ARIA COURSE START DATE: NORMAL
RAD ONC ARIA COURSE TREATMENT ELAPSED DAYS: 4
RAD ONC ARIA FIRST TREATMENT DATE: NORMAL
RAD ONC ARIA PLAN FRACTIONS TREATED TO DATE: 3
RAD ONC ARIA PLAN ID: NORMAL
RAD ONC ARIA PLAN PRESCRIBED DOSE PER FRACTION: 2.66 GY
RAD ONC ARIA PLAN PRIMARY REFERENCE POINT: NORMAL
RAD ONC ARIA PLAN TOTAL FRACTIONS PRESCRIBED: 15
RAD ONC ARIA PLAN TOTAL PRESCRIBED DOSE: 3990 CGY
RAD ONC ARIA REFERENCE POINT DOSAGE GIVEN TO DATE: 7.98 GY
RAD ONC ARIA REFERENCE POINT DOSAGE GIVEN TO DATE: 7.98 GY
RAD ONC ARIA REFERENCE POINT ID: NORMAL
RAD ONC ARIA REFERENCE POINT ID: NORMAL
RAD ONC ARIA REFERENCE POINT SESSION DOSAGE GIVEN: 2.66 GY
RAD ONC ARIA REFERENCE POINT SESSION DOSAGE GIVEN: 2.66 GY

## 2022-07-18 PROCEDURE — 77387 GUIDANCE FOR RADJ TX DLVR: CPT | Performed by: RADIOLOGY

## 2022-07-18 PROCEDURE — 77412 RADIATION TX DELIVERY LVL 3: CPT | Performed by: RADIOLOGY

## 2022-07-19 ENCOUNTER — RADIATION ONCOLOGY WEEKLY ASSESSMENT (OUTPATIENT)
Dept: RADIATION ONCOLOGY | Facility: HOSPITAL | Age: 67
End: 2022-07-19

## 2022-07-19 ENCOUNTER — NURSE NAVIGATOR (OUTPATIENT)
Dept: OTHER | Facility: HOSPITAL | Age: 67
End: 2022-07-19

## 2022-07-19 ENCOUNTER — TREATMENT (OUTPATIENT)
Dept: RADIATION ONCOLOGY | Facility: HOSPITAL | Age: 67
End: 2022-07-19

## 2022-07-19 VITALS
RESPIRATION RATE: 20 BRPM | BODY MASS INDEX: 20.39 KG/M2 | HEART RATE: 68 BPM | SYSTOLIC BLOOD PRESSURE: 151 MMHG | OXYGEN SATURATION: 99 % | WEIGHT: 122.4 LBS | DIASTOLIC BLOOD PRESSURE: 80 MMHG

## 2022-07-19 DIAGNOSIS — Z17.0 MALIGNANT NEOPLASM OF RIGHT BREAST IN FEMALE, ESTROGEN RECEPTOR POSITIVE, UNSPECIFIED SITE OF BREAST: Primary | ICD-10-CM

## 2022-07-19 DIAGNOSIS — C50.911 MALIGNANT NEOPLASM OF RIGHT BREAST IN FEMALE, ESTROGEN RECEPTOR POSITIVE, UNSPECIFIED SITE OF BREAST: Primary | ICD-10-CM

## 2022-07-19 LAB
RAD ONC ARIA COURSE ID: NORMAL
RAD ONC ARIA COURSE INTENT: NORMAL
RAD ONC ARIA COURSE LAST TREATMENT DATE: NORMAL
RAD ONC ARIA COURSE START DATE: NORMAL
RAD ONC ARIA COURSE TREATMENT ELAPSED DAYS: 5
RAD ONC ARIA FIRST TREATMENT DATE: NORMAL
RAD ONC ARIA PLAN FRACTIONS TREATED TO DATE: 4
RAD ONC ARIA PLAN ID: NORMAL
RAD ONC ARIA PLAN PRESCRIBED DOSE PER FRACTION: 2.66 GY
RAD ONC ARIA PLAN PRIMARY REFERENCE POINT: NORMAL
RAD ONC ARIA PLAN TOTAL FRACTIONS PRESCRIBED: 15
RAD ONC ARIA PLAN TOTAL PRESCRIBED DOSE: 3990 CGY
RAD ONC ARIA REFERENCE POINT DOSAGE GIVEN TO DATE: 10.64 GY
RAD ONC ARIA REFERENCE POINT DOSAGE GIVEN TO DATE: 10.64 GY
RAD ONC ARIA REFERENCE POINT ID: NORMAL
RAD ONC ARIA REFERENCE POINT ID: NORMAL
RAD ONC ARIA REFERENCE POINT SESSION DOSAGE GIVEN: 2.66 GY
RAD ONC ARIA REFERENCE POINT SESSION DOSAGE GIVEN: 2.66 GY

## 2022-07-19 PROCEDURE — 77412 RADIATION TX DELIVERY LVL 3: CPT | Performed by: RADIOLOGY

## 2022-07-19 PROCEDURE — 77387 GUIDANCE FOR RADJ TX DLVR: CPT | Performed by: RADIOLOGY

## 2022-07-19 PROCEDURE — 77336 RADIATION PHYSICS CONSULT: CPT | Performed by: RADIOLOGY

## 2022-07-19 NOTE — PROGRESS NOTES
Physician Weekly Management Note    Diagnosis:     Diagnosis Plan   1. Malignant neoplasm of right breast in female, estrogen receptor positive, unspecified site of breast (HCC)         RT Details:  fx 4/20 right breast 1064cGy    Notes on Treatment course, Films, Medical progress:  Doing well so far, no issues, no erythema    Weekly Management:  Medication reviewed?   Yes  New medications given?   No  Problemlist reviewed?   Yes  Problem added?   No  Issues raised requiring referral to support services - task assigned to:  na    Technical aspects reviewed:  Weekly OBI approved?   Yes  Weekly port films approved?   Yes  Change requests noted on port film?   No  Patient setup and plan reviewed?   Yes    Chart Reviewed:  Continue current treatment plan?   Yes  Treatment plan change requested?   No  CBC reviewed?   No  Concurrent Chemo?   No    Objective     Toxicities:   none     Review of Systems   Constitutional: Positive for fatigue.   Skin: Negative.           There were no vitals filed for this visit.    Current Status 5/26/2022   ECOG score 0       Physical Exam  Constitutional:       Appearance: Normal appearance.   Chest:          Comments: No erythema  Neurological:      Mental Status: She is alert.             Problem Summary List    Diagnosis:     Diagnosis Plan   1. Malignant neoplasm of right breast in female, estrogen receptor positive, unspecified site of breast (HCC)       Pathology:   breast    Past Medical History:   Diagnosis Date   • Back pain     2005 had ruptured disc no surgery so prn c/o pain   • Breast cancer (HCC) 03/09/2022    Right breast invasive ductal adenocarcinoma, ER/[place] positive, HER2 negative   • Carotid artery stenosis    • Cervical cancer (HCC)    • Colon polyp 08/20/2021    Rectum: hyperplastic polyp   • Diverticulosis    • Fibromuscular dysplasia (HCC)     dx 2 yrs ago   • H/O bronchitis    • Hyperlipidemia    • Loose stools    • Osteoporosis    • Positive colorectal cancer  screening using Cologuard test    • Psoriasis          Past Surgical History:   Procedure Laterality Date   • BREAST BIOPSY Right 03/09/2022    US guided mammotome vaccum assisted right breast biopsy-Dr. Julián Palacios, [facility]   • BREAST LUMPECTOMY WITH SENTINEL NODE BIOPSY Right 04/28/2022    Procedure: RIGHT BREAST WIRE LOCALIZED LUMPECTOMY WITH SENTINEL NODE BIOPSY;  Surgeon: Allie Bah MD;  Location:  [facility];  Service: General;  Laterality: Right;   • COLONOSCOPY N/A 08/20/2021    Procedure: COLONOSCOPY INTO CECUM AND TI WITH COLD BX POLYPECTOMIES;  Surgeon: Catia Kate MD;  Location: [facility] ENDOSCOPY;  Service: Gastroenterology;  Laterality: N/A;  PRE: POSITIVE COLOGUARD  POST: DIVERTICULOSIS, POLYPS, HEMORRHOIDS   • RI THROMBOENDARTECTMY NECK,NECK INCIS Left 09/14/2016    Procedure: LT CAROTID ENDARTERECTOMY WITH INTRA OPERATIVE CAROTID DUPLEX SCAN;  Surgeon: Arnulfo Hernandez MD;  Location: [facility] MAIN OR;  Service: Vascular   • SUBTOTAL HYSTERECTOMY Bilateral 09/1987    done at [facility], ovaries still in tact         Current Outpatient Medications on File Prior to Visit   Medication Sig Dispense Refill   • aspirin 81 MG EC tablet Take 81 mg by mouth Daily.     • atorvastatin (LIPITOR) 20 MG tablet Take 1 tablet by mouth Every Night. (Patient taking differently: Take 20 mg by mouth Every Night. Pharmacist said To call MD surgeon about telling pt to stop prior to surgery) 90 tablet 3   • Calcium Carbonate (CALCIUM 500 PO) Take 1 tablet by mouth Daily.     • Cholecalciferol (Vitamin D) 50 MCG (2000 UT) capsule Take 2,000 Units by mouth Daily.     • tamoxifen (NOLVADEX) 20 MG chemo tablet Take 1 tablet by mouth Daily for 30 days. 30 tablet 5     No current facility-administered medications on file prior to visit.       No Known Allergies      Referring Provider:    No referring provider defined for this encounter.    Oncologist:  No primary care provider on file.      Seen and approved by:  Linda  ESTHER Delvalle MD  07/19/2022

## 2022-07-19 NOTE — PROGRESS NOTES
Call placed to patient for telephone follow-up. Introduced self and explained role. States she feels fine and complaints verbalized. Says she is receiving XRT and has completed #4. Encouraged patient to contact nurse navigator with questions and concerns as needed. Will continue to follow.....Rashida Solorio RN, Oncology Nurse Navigator

## 2022-07-20 ENCOUNTER — TREATMENT (OUTPATIENT)
Dept: RADIATION ONCOLOGY | Facility: HOSPITAL | Age: 67
End: 2022-07-20

## 2022-07-20 LAB
RAD ONC ARIA COURSE ID: NORMAL
RAD ONC ARIA COURSE INTENT: NORMAL
RAD ONC ARIA COURSE LAST TREATMENT DATE: NORMAL
RAD ONC ARIA COURSE START DATE: NORMAL
RAD ONC ARIA COURSE TREATMENT ELAPSED DAYS: 6
RAD ONC ARIA FIRST TREATMENT DATE: NORMAL
RAD ONC ARIA PLAN FRACTIONS TREATED TO DATE: 5
RAD ONC ARIA PLAN ID: NORMAL
RAD ONC ARIA PLAN PRESCRIBED DOSE PER FRACTION: 2.66 GY
RAD ONC ARIA PLAN PRIMARY REFERENCE POINT: NORMAL
RAD ONC ARIA PLAN TOTAL FRACTIONS PRESCRIBED: 15
RAD ONC ARIA PLAN TOTAL PRESCRIBED DOSE: 3990 CGY
RAD ONC ARIA REFERENCE POINT DOSAGE GIVEN TO DATE: 13.3 GY
RAD ONC ARIA REFERENCE POINT DOSAGE GIVEN TO DATE: 13.3 GY
RAD ONC ARIA REFERENCE POINT ID: NORMAL
RAD ONC ARIA REFERENCE POINT ID: NORMAL
RAD ONC ARIA REFERENCE POINT SESSION DOSAGE GIVEN: 2.66 GY
RAD ONC ARIA REFERENCE POINT SESSION DOSAGE GIVEN: 2.66 GY

## 2022-07-20 PROCEDURE — 77412 RADIATION TX DELIVERY LVL 3: CPT | Performed by: RADIOLOGY

## 2022-07-20 PROCEDURE — 77387 GUIDANCE FOR RADJ TX DLVR: CPT | Performed by: RADIOLOGY

## 2022-07-21 ENCOUNTER — TREATMENT (OUTPATIENT)
Dept: RADIATION ONCOLOGY | Facility: HOSPITAL | Age: 67
End: 2022-07-21

## 2022-07-21 LAB
RAD ONC ARIA COURSE ID: NORMAL
RAD ONC ARIA COURSE INTENT: NORMAL
RAD ONC ARIA COURSE LAST TREATMENT DATE: NORMAL
RAD ONC ARIA COURSE START DATE: NORMAL
RAD ONC ARIA COURSE TREATMENT ELAPSED DAYS: 7
RAD ONC ARIA FIRST TREATMENT DATE: NORMAL
RAD ONC ARIA PLAN FRACTIONS TREATED TO DATE: 6
RAD ONC ARIA PLAN ID: NORMAL
RAD ONC ARIA PLAN PRESCRIBED DOSE PER FRACTION: 2.66 GY
RAD ONC ARIA PLAN PRIMARY REFERENCE POINT: NORMAL
RAD ONC ARIA PLAN TOTAL FRACTIONS PRESCRIBED: 15
RAD ONC ARIA PLAN TOTAL PRESCRIBED DOSE: 3990 CGY
RAD ONC ARIA REFERENCE POINT DOSAGE GIVEN TO DATE: 15.96 GY
RAD ONC ARIA REFERENCE POINT DOSAGE GIVEN TO DATE: 15.96 GY
RAD ONC ARIA REFERENCE POINT ID: NORMAL
RAD ONC ARIA REFERENCE POINT ID: NORMAL
RAD ONC ARIA REFERENCE POINT SESSION DOSAGE GIVEN: 2.66 GY
RAD ONC ARIA REFERENCE POINT SESSION DOSAGE GIVEN: 2.66 GY

## 2022-07-21 PROCEDURE — 77412 RADIATION TX DELIVERY LVL 3: CPT | Performed by: RADIOLOGY

## 2022-07-21 PROCEDURE — 77387 GUIDANCE FOR RADJ TX DLVR: CPT | Performed by: RADIOLOGY

## 2022-07-21 PROCEDURE — 77427 RADIATION TX MANAGEMENT X5: CPT | Performed by: RADIOLOGY

## 2022-07-22 ENCOUNTER — TREATMENT (OUTPATIENT)
Dept: RADIATION ONCOLOGY | Facility: HOSPITAL | Age: 67
End: 2022-07-22

## 2022-07-22 LAB
RAD ONC ARIA COURSE ID: NORMAL
RAD ONC ARIA COURSE INTENT: NORMAL
RAD ONC ARIA COURSE LAST TREATMENT DATE: NORMAL
RAD ONC ARIA COURSE START DATE: NORMAL
RAD ONC ARIA COURSE TREATMENT ELAPSED DAYS: 8
RAD ONC ARIA FIRST TREATMENT DATE: NORMAL
RAD ONC ARIA PLAN FRACTIONS TREATED TO DATE: 7
RAD ONC ARIA PLAN ID: NORMAL
RAD ONC ARIA PLAN PRESCRIBED DOSE PER FRACTION: 2.66 GY
RAD ONC ARIA PLAN PRIMARY REFERENCE POINT: NORMAL
RAD ONC ARIA PLAN TOTAL FRACTIONS PRESCRIBED: 15
RAD ONC ARIA PLAN TOTAL PRESCRIBED DOSE: 3990 CGY
RAD ONC ARIA REFERENCE POINT DOSAGE GIVEN TO DATE: 18.62 GY
RAD ONC ARIA REFERENCE POINT DOSAGE GIVEN TO DATE: 18.62 GY
RAD ONC ARIA REFERENCE POINT ID: NORMAL
RAD ONC ARIA REFERENCE POINT ID: NORMAL
RAD ONC ARIA REFERENCE POINT SESSION DOSAGE GIVEN: 2.66 GY
RAD ONC ARIA REFERENCE POINT SESSION DOSAGE GIVEN: 2.66 GY

## 2022-07-22 PROCEDURE — 77412 RADIATION TX DELIVERY LVL 3: CPT | Performed by: RADIOLOGY

## 2022-07-22 PROCEDURE — 77387 GUIDANCE FOR RADJ TX DLVR: CPT | Performed by: RADIOLOGY

## 2022-07-25 ENCOUNTER — TREATMENT (OUTPATIENT)
Dept: RADIATION ONCOLOGY | Facility: HOSPITAL | Age: 67
End: 2022-07-25

## 2022-07-25 LAB
RAD ONC ARIA COURSE ID: NORMAL
RAD ONC ARIA COURSE INTENT: NORMAL
RAD ONC ARIA COURSE LAST TREATMENT DATE: NORMAL
RAD ONC ARIA COURSE START DATE: NORMAL
RAD ONC ARIA COURSE TREATMENT ELAPSED DAYS: 11
RAD ONC ARIA FIRST TREATMENT DATE: NORMAL
RAD ONC ARIA PLAN FRACTIONS TREATED TO DATE: 8
RAD ONC ARIA PLAN ID: NORMAL
RAD ONC ARIA PLAN PRESCRIBED DOSE PER FRACTION: 2.66 GY
RAD ONC ARIA PLAN PRIMARY REFERENCE POINT: NORMAL
RAD ONC ARIA PLAN TOTAL FRACTIONS PRESCRIBED: 15
RAD ONC ARIA PLAN TOTAL PRESCRIBED DOSE: 3990 CGY
RAD ONC ARIA REFERENCE POINT DOSAGE GIVEN TO DATE: 21.28 GY
RAD ONC ARIA REFERENCE POINT DOSAGE GIVEN TO DATE: 21.28 GY
RAD ONC ARIA REFERENCE POINT ID: NORMAL
RAD ONC ARIA REFERENCE POINT ID: NORMAL
RAD ONC ARIA REFERENCE POINT SESSION DOSAGE GIVEN: 2.66 GY
RAD ONC ARIA REFERENCE POINT SESSION DOSAGE GIVEN: 2.66 GY

## 2022-07-25 PROCEDURE — 77412 RADIATION TX DELIVERY LVL 3: CPT | Performed by: RADIOLOGY

## 2022-07-25 PROCEDURE — 77387 GUIDANCE FOR RADJ TX DLVR: CPT | Performed by: RADIOLOGY

## 2022-07-26 ENCOUNTER — RADIATION ONCOLOGY WEEKLY ASSESSMENT (OUTPATIENT)
Dept: RADIATION ONCOLOGY | Facility: HOSPITAL | Age: 67
End: 2022-07-26

## 2022-07-26 ENCOUNTER — TREATMENT (OUTPATIENT)
Dept: RADIATION ONCOLOGY | Facility: HOSPITAL | Age: 67
End: 2022-07-26

## 2022-07-26 VITALS
DIASTOLIC BLOOD PRESSURE: 77 MMHG | WEIGHT: 122.2 LBS | OXYGEN SATURATION: 97 % | BODY MASS INDEX: 20.36 KG/M2 | HEART RATE: 66 BPM | SYSTOLIC BLOOD PRESSURE: 163 MMHG

## 2022-07-26 DIAGNOSIS — Z17.0 MALIGNANT NEOPLASM OF RIGHT BREAST IN FEMALE, ESTROGEN RECEPTOR POSITIVE, UNSPECIFIED SITE OF BREAST: Primary | ICD-10-CM

## 2022-07-26 DIAGNOSIS — C50.911 MALIGNANT NEOPLASM OF RIGHT BREAST IN FEMALE, ESTROGEN RECEPTOR POSITIVE, UNSPECIFIED SITE OF BREAST: Primary | ICD-10-CM

## 2022-07-26 LAB
RAD ONC ARIA COURSE ID: NORMAL
RAD ONC ARIA COURSE INTENT: NORMAL
RAD ONC ARIA COURSE LAST TREATMENT DATE: NORMAL
RAD ONC ARIA COURSE START DATE: NORMAL
RAD ONC ARIA COURSE TREATMENT ELAPSED DAYS: 12
RAD ONC ARIA FIRST TREATMENT DATE: NORMAL
RAD ONC ARIA PLAN FRACTIONS TREATED TO DATE: 9
RAD ONC ARIA PLAN ID: NORMAL
RAD ONC ARIA PLAN PRESCRIBED DOSE PER FRACTION: 2.66 GY
RAD ONC ARIA PLAN PRIMARY REFERENCE POINT: NORMAL
RAD ONC ARIA PLAN TOTAL FRACTIONS PRESCRIBED: 15
RAD ONC ARIA PLAN TOTAL PRESCRIBED DOSE: 3990 CGY
RAD ONC ARIA REFERENCE POINT DOSAGE GIVEN TO DATE: 23.93 GY
RAD ONC ARIA REFERENCE POINT DOSAGE GIVEN TO DATE: 23.94 GY
RAD ONC ARIA REFERENCE POINT ID: NORMAL
RAD ONC ARIA REFERENCE POINT ID: NORMAL
RAD ONC ARIA REFERENCE POINT SESSION DOSAGE GIVEN: 2.66 GY
RAD ONC ARIA REFERENCE POINT SESSION DOSAGE GIVEN: 2.66 GY

## 2022-07-26 PROCEDURE — 77387 GUIDANCE FOR RADJ TX DLVR: CPT | Performed by: RADIOLOGY

## 2022-07-26 PROCEDURE — 77412 RADIATION TX DELIVERY LVL 3: CPT | Performed by: RADIOLOGY

## 2022-07-26 NOTE — PROGRESS NOTES
Physician Weekly Management Note    Diagnosis:     Diagnosis Plan   1. Malignant neoplasm of right breast in female, estrogen receptor positive, unspecified site of breast (HCC)         RT Details:  fx 9/20 right breast     Notes on Treatment course, Films, Medical progress:  Doing well, kps 90% no erythema cont on, no pain    Weekly Management:  Medication reviewed?   Yes  New medications given?   No  Problemlist reviewed?   Yes  Problem added?   No  Issues raised requiring referral to support services - task assigned to:  luis    Technical aspects reviewed:  Weekly OBI approved?   Yes  Weekly port films approved?   Yes  Change requests noted on port film?   No  Patient setup and plan reviewed?   Yes    Chart Reviewed:  Continue current treatment plan?   Yes  Treatment plan change requested?   No  CBC reviewed?   No  Concurrent Chemo?   No    Objective     Toxicities:   none     Review of Systems   Constitutional: Negative.    Skin: Negative.           Vitals:    07/26/22 1517   BP: 163/77   Pulse: 66   SpO2: 97%       Current Status 5/26/2022   ECOG score 0       Physical Exam  Constitutional:       Appearance: Normal appearance.   Chest:          Comments: No erythema            Problem Summary List    Diagnosis:     Diagnosis Plan   1. Malignant neoplasm of right breast in female, estrogen receptor positive, unspecified site of breast (HCC)       Pathology:   breast    Past Medical History:   Diagnosis Date   • Back pain     2005 had ruptured disc no surgery so prn c/o pain   • Breast cancer (HCC) 03/09/2022    Right breast invasive ductal adenocarcinoma, ER/[place] positive, HER2 negative   • Carotid artery stenosis    • Cervical cancer (HCC)    • Colon polyp 08/20/2021    Rectum: hyperplastic polyp   • Diverticulosis    • Fibromuscular dysplasia (HCC)     dx 2 yrs ago   • H/O bronchitis    • Hyperlipidemia    • Loose stools    • Osteoporosis    • Positive colorectal cancer screening using Cologuard test    •  Psoriasis          Past Surgical History:   Procedure Laterality Date   • BREAST BIOPSY Right 03/09/2022    US guided mammotome vaccum assisted right breast biopsy-Dr. Julián Palacios, [facility]   • BREAST LUMPECTOMY WITH SENTINEL NODE BIOPSY Right 04/28/2022    Procedure: RIGHT BREAST WIRE LOCALIZED LUMPECTOMY WITH SENTINEL NODE BIOPSY;  Surgeon: Allie Bah MD;  Location:  [facility];  Service: General;  Laterality: Right;   • COLONOSCOPY N/A 08/20/2021    Procedure: COLONOSCOPY INTO CECUM AND TI WITH COLD BX POLYPECTOMIES;  Surgeon: Catia Kate MD;  Location: [facility] ENDOSCOPY;  Service: Gastroenterology;  Laterality: N/A;  PRE: POSITIVE COLOGUARD  POST: DIVERTICULOSIS, POLYPS, HEMORRHOIDS   • IA THROMBOENDARTECTMY NECK,NECK INCIS Left 09/14/2016    Procedure: LT CAROTID ENDARTERECTOMY WITH INTRA OPERATIVE CAROTID DUPLEX SCAN;  Surgeon: Arnulfo Hernandez MD;  Location: [facility] MAIN OR;  Service: Vascular   • SUBTOTAL HYSTERECTOMY Bilateral 09/1987    done at [facility], ovaries still in tact         Current Outpatient Medications on File Prior to Visit   Medication Sig Dispense Refill   • aspirin 81 MG EC tablet Take 81 mg by mouth Daily.     • atorvastatin (LIPITOR) 20 MG tablet Take 1 tablet by mouth Every Night. (Patient taking differently: Take 20 mg by mouth Every Night. Pharmacist said To call MD surgeon about telling pt to stop prior to surgery) 90 tablet 3   • Calcium Carbonate (CALCIUM 500 PO) Take 1 tablet by mouth Daily.     • Cholecalciferol (Vitamin D) 50 MCG (2000 UT) capsule Take 2,000 Units by mouth Daily.     • tamoxifen (NOLVADEX) 20 MG chemo tablet Take 1 tablet by mouth Daily for 30 days. 30 tablet 5     No current facility-administered medications on file prior to visit.       No Known Allergies      Referring Provider:    No referring provider defined for this encounter.    Oncologist:  No primary care provider on file.      Seen and approved by:  Linda Delvalle MD  07/26/2022

## 2022-07-27 ENCOUNTER — TREATMENT (OUTPATIENT)
Dept: RADIATION ONCOLOGY | Facility: HOSPITAL | Age: 67
End: 2022-07-27

## 2022-07-27 LAB
RAD ONC ARIA COURSE ID: NORMAL
RAD ONC ARIA COURSE INTENT: NORMAL
RAD ONC ARIA COURSE LAST TREATMENT DATE: NORMAL
RAD ONC ARIA COURSE START DATE: NORMAL
RAD ONC ARIA COURSE TREATMENT ELAPSED DAYS: 13
RAD ONC ARIA FIRST TREATMENT DATE: NORMAL
RAD ONC ARIA PLAN FRACTIONS TREATED TO DATE: 10
RAD ONC ARIA PLAN ID: NORMAL
RAD ONC ARIA PLAN PRESCRIBED DOSE PER FRACTION: 2.66 GY
RAD ONC ARIA PLAN PRIMARY REFERENCE POINT: NORMAL
RAD ONC ARIA PLAN TOTAL FRACTIONS PRESCRIBED: 15
RAD ONC ARIA PLAN TOTAL PRESCRIBED DOSE: 3990 CGY
RAD ONC ARIA REFERENCE POINT DOSAGE GIVEN TO DATE: 26.59 GY
RAD ONC ARIA REFERENCE POINT DOSAGE GIVEN TO DATE: 26.6 GY
RAD ONC ARIA REFERENCE POINT ID: NORMAL
RAD ONC ARIA REFERENCE POINT ID: NORMAL
RAD ONC ARIA REFERENCE POINT SESSION DOSAGE GIVEN: 2.66 GY
RAD ONC ARIA REFERENCE POINT SESSION DOSAGE GIVEN: 2.66 GY

## 2022-07-27 PROCEDURE — 77336 RADIATION PHYSICS CONSULT: CPT | Performed by: RADIOLOGY

## 2022-07-27 PROCEDURE — 77412 RADIATION TX DELIVERY LVL 3: CPT | Performed by: RADIOLOGY

## 2022-07-28 ENCOUNTER — TREATMENT (OUTPATIENT)
Dept: RADIATION ONCOLOGY | Facility: HOSPITAL | Age: 67
End: 2022-07-28

## 2022-07-28 LAB
RAD ONC ARIA COURSE ID: NORMAL
RAD ONC ARIA COURSE INTENT: NORMAL
RAD ONC ARIA COURSE LAST TREATMENT DATE: NORMAL
RAD ONC ARIA COURSE START DATE: NORMAL
RAD ONC ARIA COURSE TREATMENT ELAPSED DAYS: 14
RAD ONC ARIA FIRST TREATMENT DATE: NORMAL
RAD ONC ARIA PLAN FRACTIONS TREATED TO DATE: 11
RAD ONC ARIA PLAN ID: NORMAL
RAD ONC ARIA PLAN PRESCRIBED DOSE PER FRACTION: 2.66 GY
RAD ONC ARIA PLAN PRIMARY REFERENCE POINT: NORMAL
RAD ONC ARIA PLAN TOTAL FRACTIONS PRESCRIBED: 15
RAD ONC ARIA PLAN TOTAL PRESCRIBED DOSE: 3990 CGY
RAD ONC ARIA REFERENCE POINT DOSAGE GIVEN TO DATE: 29.25 GY
RAD ONC ARIA REFERENCE POINT DOSAGE GIVEN TO DATE: 29.26 GY
RAD ONC ARIA REFERENCE POINT ID: NORMAL
RAD ONC ARIA REFERENCE POINT ID: NORMAL
RAD ONC ARIA REFERENCE POINT SESSION DOSAGE GIVEN: 2.66 GY
RAD ONC ARIA REFERENCE POINT SESSION DOSAGE GIVEN: 2.66 GY

## 2022-07-28 PROCEDURE — 77412 RADIATION TX DELIVERY LVL 3: CPT | Performed by: RADIOLOGY

## 2022-07-28 PROCEDURE — 77427 RADIATION TX MANAGEMENT X5: CPT | Performed by: RADIOLOGY

## 2022-07-28 PROCEDURE — 77417 THER RADIOLOGY PORT IMAGE(S): CPT | Performed by: RADIOLOGY

## 2022-07-29 ENCOUNTER — TREATMENT (OUTPATIENT)
Dept: RADIATION ONCOLOGY | Facility: HOSPITAL | Age: 67
End: 2022-07-29

## 2022-07-29 LAB
RAD ONC ARIA COURSE ID: NORMAL
RAD ONC ARIA COURSE INTENT: NORMAL
RAD ONC ARIA COURSE LAST TREATMENT DATE: NORMAL
RAD ONC ARIA COURSE START DATE: NORMAL
RAD ONC ARIA COURSE TREATMENT ELAPSED DAYS: 15
RAD ONC ARIA FIRST TREATMENT DATE: NORMAL
RAD ONC ARIA PLAN FRACTIONS TREATED TO DATE: 12
RAD ONC ARIA PLAN ID: NORMAL
RAD ONC ARIA PLAN PRESCRIBED DOSE PER FRACTION: 2.66 GY
RAD ONC ARIA PLAN PRIMARY REFERENCE POINT: NORMAL
RAD ONC ARIA PLAN TOTAL FRACTIONS PRESCRIBED: 15
RAD ONC ARIA PLAN TOTAL PRESCRIBED DOSE: 3990 CGY
RAD ONC ARIA REFERENCE POINT DOSAGE GIVEN TO DATE: 31.91 GY
RAD ONC ARIA REFERENCE POINT DOSAGE GIVEN TO DATE: 31.92 GY
RAD ONC ARIA REFERENCE POINT ID: NORMAL
RAD ONC ARIA REFERENCE POINT ID: NORMAL
RAD ONC ARIA REFERENCE POINT SESSION DOSAGE GIVEN: 2.66 GY
RAD ONC ARIA REFERENCE POINT SESSION DOSAGE GIVEN: 2.66 GY

## 2022-07-29 PROCEDURE — 77412 RADIATION TX DELIVERY LVL 3: CPT | Performed by: RADIOLOGY

## 2022-08-01 ENCOUNTER — APPOINTMENT (OUTPATIENT)
Dept: RADIATION ONCOLOGY | Facility: HOSPITAL | Age: 67
End: 2022-08-01

## 2022-08-01 LAB
RAD ONC ARIA COURSE ID: NORMAL
RAD ONC ARIA COURSE INTENT: NORMAL
RAD ONC ARIA COURSE LAST TREATMENT DATE: NORMAL
RAD ONC ARIA COURSE START DATE: NORMAL
RAD ONC ARIA COURSE TREATMENT ELAPSED DAYS: 18
RAD ONC ARIA FIRST TREATMENT DATE: NORMAL
RAD ONC ARIA PLAN FRACTIONS TREATED TO DATE: 13
RAD ONC ARIA PLAN ID: NORMAL
RAD ONC ARIA PLAN PRESCRIBED DOSE PER FRACTION: 2.66 GY
RAD ONC ARIA PLAN PRIMARY REFERENCE POINT: NORMAL
RAD ONC ARIA PLAN TOTAL FRACTIONS PRESCRIBED: 15
RAD ONC ARIA PLAN TOTAL PRESCRIBED DOSE: 3990 CGY
RAD ONC ARIA REFERENCE POINT DOSAGE GIVEN TO DATE: 34.57 GY
RAD ONC ARIA REFERENCE POINT DOSAGE GIVEN TO DATE: 34.58 GY
RAD ONC ARIA REFERENCE POINT ID: NORMAL
RAD ONC ARIA REFERENCE POINT ID: NORMAL
RAD ONC ARIA REFERENCE POINT SESSION DOSAGE GIVEN: 2.66 GY
RAD ONC ARIA REFERENCE POINT SESSION DOSAGE GIVEN: 2.66 GY

## 2022-08-01 PROCEDURE — 77412 RADIATION TX DELIVERY LVL 3: CPT | Performed by: RADIOLOGY

## 2022-08-02 ENCOUNTER — TREATMENT (OUTPATIENT)
Dept: RADIATION ONCOLOGY | Facility: HOSPITAL | Age: 67
End: 2022-08-02

## 2022-08-02 ENCOUNTER — RADIATION ONCOLOGY WEEKLY ASSESSMENT (OUTPATIENT)
Dept: RADIATION ONCOLOGY | Facility: HOSPITAL | Age: 67
End: 2022-08-02

## 2022-08-02 VITALS
OXYGEN SATURATION: 99 % | RESPIRATION RATE: 16 BRPM | HEART RATE: 76 BPM | WEIGHT: 122.8 LBS | DIASTOLIC BLOOD PRESSURE: 73 MMHG | BODY MASS INDEX: 20.46 KG/M2 | SYSTOLIC BLOOD PRESSURE: 158 MMHG

## 2022-08-02 DIAGNOSIS — Z17.0 MALIGNANT NEOPLASM OF RIGHT BREAST IN FEMALE, ESTROGEN RECEPTOR POSITIVE, UNSPECIFIED SITE OF BREAST: Primary | ICD-10-CM

## 2022-08-02 DIAGNOSIS — C50.911 MALIGNANT NEOPLASM OF RIGHT BREAST IN FEMALE, ESTROGEN RECEPTOR POSITIVE, UNSPECIFIED SITE OF BREAST: Primary | ICD-10-CM

## 2022-08-02 LAB
RAD ONC ARIA COURSE ID: NORMAL
RAD ONC ARIA COURSE INTENT: NORMAL
RAD ONC ARIA COURSE LAST TREATMENT DATE: NORMAL
RAD ONC ARIA COURSE START DATE: NORMAL
RAD ONC ARIA COURSE TREATMENT ELAPSED DAYS: 19
RAD ONC ARIA FIRST TREATMENT DATE: NORMAL
RAD ONC ARIA PLAN FRACTIONS TREATED TO DATE: 14
RAD ONC ARIA PLAN ID: NORMAL
RAD ONC ARIA PLAN PRESCRIBED DOSE PER FRACTION: 2.66 GY
RAD ONC ARIA PLAN PRIMARY REFERENCE POINT: NORMAL
RAD ONC ARIA PLAN TOTAL FRACTIONS PRESCRIBED: 15
RAD ONC ARIA PLAN TOTAL PRESCRIBED DOSE: 3990 CGY
RAD ONC ARIA REFERENCE POINT DOSAGE GIVEN TO DATE: 37.23 GY
RAD ONC ARIA REFERENCE POINT DOSAGE GIVEN TO DATE: 37.24 GY
RAD ONC ARIA REFERENCE POINT ID: NORMAL
RAD ONC ARIA REFERENCE POINT ID: NORMAL
RAD ONC ARIA REFERENCE POINT SESSION DOSAGE GIVEN: 2.66 GY
RAD ONC ARIA REFERENCE POINT SESSION DOSAGE GIVEN: 2.66 GY

## 2022-08-02 PROCEDURE — 77336 RADIATION PHYSICS CONSULT: CPT | Performed by: RADIOLOGY

## 2022-08-02 PROCEDURE — 77412 RADIATION TX DELIVERY LVL 3: CPT | Performed by: RADIOLOGY

## 2022-08-02 NOTE — PROGRESS NOTES
Physician Weekly Management Note    Diagnosis:     Diagnosis Plan   1. Malignant neoplasm of right breast in female, estrogen receptor positive, unspecified site of breast (HCC)         RT Details:  fx 14/20 right breast, 3724 cGy/3990 plus boost x 1000cgy     Notes on Treatment course, Films, Medical progress:  Doing well, mild erythema, cont on kps 90%    Weekly Management:  Medication reviewed?   Yes  New medications given?   No  Problemlist reviewed?   Yes  Problem added?   No  Issues raised requiring referral to support services - task assigned to:  na    Technical aspects reviewed:  Weekly OBI approved?   Yes  Weekly port films approved?   Yes  Change requests noted on port film?   No  Patient setup and plan reviewed?   Yes    Chart Reviewed:  Continue current treatment plan?   Yes  Treatment plan change requested?   No  CBC reviewed?   Yes  Concurrent Chemo?   No    Objective     Toxicities:   none     Review of Systems   Constitutional: Negative.    Skin: Positive for color change.          There were no vitals filed for this visit.    Current Status 5/26/2022   ECOG score 0       Physical Exam  Constitutional:       Appearance: Normal appearance.   Chest:          Comments: Mild erythema right breast  Neurological:      Mental Status: She is alert.             Problem Summary List    Diagnosis:     Diagnosis Plan   1. Malignant neoplasm of right breast in female, estrogen receptor positive, unspecified site of breast (HCC)       Pathology:   breast    Past Medical History:   Diagnosis Date   • Back pain     2005 had ruptured disc no surgery so prn c/o pain   • Breast cancer (HCC) 03/09/2022    Right breast invasive ductal adenocarcinoma, ER/[place] positive, HER2 negative   • Carotid artery stenosis    • Cervical cancer (HCC)    • Colon polyp 08/20/2021    Rectum: hyperplastic polyp   • Diverticulosis    • Fibromuscular dysplasia (HCC)     dx 2 yrs ago   • H/O bronchitis    • Hyperlipidemia    • Loose stools     • Osteoporosis    • Positive colorectal cancer screening using Cologuard test    • Psoriasis          Past Surgical History:   Procedure Laterality Date   • BREAST BIOPSY Right 03/09/2022    US guided mammotome vaccum assisted right breast biopsy-Dr. Julián Palacios, [facility]   • BREAST LUMPECTOMY WITH SENTINEL NODE BIOPSY Right 04/28/2022    Procedure: RIGHT BREAST WIRE LOCALIZED LUMPECTOMY WITH SENTINEL NODE BIOPSY;  Surgeon: Allie Bah MD;  Location:  [facility];  Service: General;  Laterality: Right;   • COLONOSCOPY N/A 08/20/2021    Procedure: COLONOSCOPY INTO CECUM AND TI WITH COLD BX POLYPECTOMIES;  Surgeon: Catia Kate MD;  Location: [facility] ENDOSCOPY;  Service: Gastroenterology;  Laterality: N/A;  PRE: POSITIVE COLOGUARD  POST: DIVERTICULOSIS, POLYPS, HEMORRHOIDS   • [place] THROMBOENDARTECTMY NECK,NECK INCIS Left 09/14/2016    Procedure: LT CAROTID ENDARTERECTOMY WITH INTRA OPERATIVE CAROTID DUPLEX SCAN;  Surgeon: Arnulfo Hernandez MD;  Location: [facility] MAIN OR;  Service: Vascular   • SUBTOTAL HYSTERECTOMY Bilateral 09/1987    done at [facility], ovaries still in tact         Current Outpatient Medications on File Prior to Visit   Medication Sig Dispense Refill   • aspirin 81 MG EC tablet Take 81 mg by mouth Daily.     • atorvastatin (LIPITOR) 20 MG tablet Take 1 tablet by mouth Every Night. (Patient taking differently: Take 20 mg by mouth Every Night. Pharmacist said To call MD surgeon about telling pt to stop prior to surgery) 90 tablet 3   • Calcium Carbonate (CALCIUM 500 PO) Take 1 tablet by mouth Daily.     • Cholecalciferol (Vitamin D) 50 MCG (2000 UT) capsule Take 2,000 Units by mouth Daily.       No current facility-administered medications on file prior to visit.       No Known Allergies      Referring Provider:    No referring provider defined for this encounter.    Oncologist:  No primary care provider on file.      Seen and approved by:  Linda Delvalle MD  08/02/2022

## 2022-08-03 ENCOUNTER — TREATMENT (OUTPATIENT)
Dept: RADIATION ONCOLOGY | Facility: HOSPITAL | Age: 67
End: 2022-08-03

## 2022-08-03 LAB
RAD ONC ARIA COURSE ID: NORMAL
RAD ONC ARIA COURSE INTENT: NORMAL
RAD ONC ARIA COURSE LAST TREATMENT DATE: NORMAL
RAD ONC ARIA COURSE START DATE: NORMAL
RAD ONC ARIA COURSE TREATMENT ELAPSED DAYS: 20
RAD ONC ARIA FIRST TREATMENT DATE: NORMAL
RAD ONC ARIA PLAN FRACTIONS TREATED TO DATE: 15
RAD ONC ARIA PLAN ID: NORMAL
RAD ONC ARIA PLAN PRESCRIBED DOSE PER FRACTION: 2.66 GY
RAD ONC ARIA PLAN PRIMARY REFERENCE POINT: NORMAL
RAD ONC ARIA PLAN TOTAL FRACTIONS PRESCRIBED: 15
RAD ONC ARIA PLAN TOTAL PRESCRIBED DOSE: 3990 CGY
RAD ONC ARIA REFERENCE POINT DOSAGE GIVEN TO DATE: 39.89 GY
RAD ONC ARIA REFERENCE POINT DOSAGE GIVEN TO DATE: 39.9 GY
RAD ONC ARIA REFERENCE POINT ID: NORMAL
RAD ONC ARIA REFERENCE POINT ID: NORMAL
RAD ONC ARIA REFERENCE POINT SESSION DOSAGE GIVEN: 2.66 GY
RAD ONC ARIA REFERENCE POINT SESSION DOSAGE GIVEN: 2.66 GY

## 2022-08-03 PROCEDURE — 77412 RADIATION TX DELIVERY LVL 3: CPT | Performed by: RADIOLOGY

## 2022-08-04 PROCEDURE — 77427 RADIATION TX MANAGEMENT X5: CPT | Performed by: RADIOLOGY

## 2022-08-04 PROCEDURE — 77280 THER RAD SIMULAJ FIELD SMPL: CPT | Performed by: RADIOLOGY

## 2022-08-04 PROCEDURE — 77412 RADIATION TX DELIVERY LVL 3: CPT | Performed by: RADIOLOGY

## 2022-08-05 ENCOUNTER — TREATMENT (OUTPATIENT)
Dept: RADIATION ONCOLOGY | Facility: HOSPITAL | Age: 67
End: 2022-08-05

## 2022-08-05 LAB
RAD ONC ARIA COURSE ID: NORMAL
RAD ONC ARIA COURSE INTENT: NORMAL
RAD ONC ARIA COURSE LAST TREATMENT DATE: NORMAL
RAD ONC ARIA COURSE START DATE: NORMAL
RAD ONC ARIA COURSE TREATMENT ELAPSED DAYS: 22
RAD ONC ARIA FIRST TREATMENT DATE: NORMAL
RAD ONC ARIA PLAN FRACTIONS TREATED TO DATE: 2
RAD ONC ARIA PLAN ID: NORMAL
RAD ONC ARIA PLAN PRESCRIBED DOSE PER FRACTION: 2 GY
RAD ONC ARIA PLAN PRIMARY REFERENCE POINT: NORMAL
RAD ONC ARIA PLAN TOTAL FRACTIONS PRESCRIBED: 5
RAD ONC ARIA PLAN TOTAL PRESCRIBED DOSE: 1000 CGY
RAD ONC ARIA REFERENCE POINT DOSAGE GIVEN TO DATE: 4 GY
RAD ONC ARIA REFERENCE POINT DOSAGE GIVEN TO DATE: 4.14 GY
RAD ONC ARIA REFERENCE POINT ID: NORMAL
RAD ONC ARIA REFERENCE POINT ID: NORMAL
RAD ONC ARIA REFERENCE POINT SESSION DOSAGE GIVEN: 2 GY
RAD ONC ARIA REFERENCE POINT SESSION DOSAGE GIVEN: 2.07 GY

## 2022-08-05 PROCEDURE — 77412 RADIATION TX DELIVERY LVL 3: CPT | Performed by: RADIOLOGY

## 2022-08-08 ENCOUNTER — TREATMENT (OUTPATIENT)
Dept: RADIATION ONCOLOGY | Facility: HOSPITAL | Age: 67
End: 2022-08-08

## 2022-08-08 LAB
RAD ONC ARIA COURSE ID: NORMAL
RAD ONC ARIA COURSE INTENT: NORMAL
RAD ONC ARIA COURSE LAST TREATMENT DATE: NORMAL
RAD ONC ARIA COURSE START DATE: NORMAL
RAD ONC ARIA COURSE TREATMENT ELAPSED DAYS: 25
RAD ONC ARIA FIRST TREATMENT DATE: NORMAL
RAD ONC ARIA PLAN FRACTIONS TREATED TO DATE: 3
RAD ONC ARIA PLAN ID: NORMAL
RAD ONC ARIA PLAN PRESCRIBED DOSE PER FRACTION: 2 GY
RAD ONC ARIA PLAN PRIMARY REFERENCE POINT: NORMAL
RAD ONC ARIA PLAN TOTAL FRACTIONS PRESCRIBED: 5
RAD ONC ARIA PLAN TOTAL PRESCRIBED DOSE: 1000 CGY
RAD ONC ARIA REFERENCE POINT DOSAGE GIVEN TO DATE: 6 GY
RAD ONC ARIA REFERENCE POINT DOSAGE GIVEN TO DATE: 6.22 GY
RAD ONC ARIA REFERENCE POINT ID: NORMAL
RAD ONC ARIA REFERENCE POINT ID: NORMAL
RAD ONC ARIA REFERENCE POINT SESSION DOSAGE GIVEN: 2 GY
RAD ONC ARIA REFERENCE POINT SESSION DOSAGE GIVEN: 2.07 GY

## 2022-08-08 PROCEDURE — 77412 RADIATION TX DELIVERY LVL 3: CPT | Performed by: RADIOLOGY

## 2022-08-09 ENCOUNTER — TREATMENT (OUTPATIENT)
Dept: RADIATION ONCOLOGY | Facility: HOSPITAL | Age: 67
End: 2022-08-09

## 2022-08-09 ENCOUNTER — RADIATION ONCOLOGY WEEKLY ASSESSMENT (OUTPATIENT)
Dept: RADIATION ONCOLOGY | Facility: HOSPITAL | Age: 67
End: 2022-08-09

## 2022-08-09 VITALS
HEART RATE: 78 BPM | BODY MASS INDEX: 20.13 KG/M2 | DIASTOLIC BLOOD PRESSURE: 77 MMHG | SYSTOLIC BLOOD PRESSURE: 164 MMHG | OXYGEN SATURATION: 100 % | WEIGHT: 120.8 LBS

## 2022-08-09 DIAGNOSIS — C50.911 MALIGNANT NEOPLASM OF RIGHT BREAST IN FEMALE, ESTROGEN RECEPTOR POSITIVE, UNSPECIFIED SITE OF BREAST: Primary | ICD-10-CM

## 2022-08-09 DIAGNOSIS — Z17.0 MALIGNANT NEOPLASM OF RIGHT BREAST IN FEMALE, ESTROGEN RECEPTOR POSITIVE, UNSPECIFIED SITE OF BREAST: Primary | ICD-10-CM

## 2022-08-09 LAB
RAD ONC ARIA COURSE ID: NORMAL
RAD ONC ARIA COURSE INTENT: NORMAL
RAD ONC ARIA COURSE LAST TREATMENT DATE: NORMAL
RAD ONC ARIA COURSE START DATE: NORMAL
RAD ONC ARIA COURSE TREATMENT ELAPSED DAYS: 26
RAD ONC ARIA FIRST TREATMENT DATE: NORMAL
RAD ONC ARIA PLAN FRACTIONS TREATED TO DATE: 4
RAD ONC ARIA PLAN ID: NORMAL
RAD ONC ARIA PLAN PRESCRIBED DOSE PER FRACTION: 2 GY
RAD ONC ARIA PLAN PRIMARY REFERENCE POINT: NORMAL
RAD ONC ARIA PLAN TOTAL FRACTIONS PRESCRIBED: 5
RAD ONC ARIA PLAN TOTAL PRESCRIBED DOSE: 1000 CGY
RAD ONC ARIA REFERENCE POINT DOSAGE GIVEN TO DATE: 8 GY
RAD ONC ARIA REFERENCE POINT DOSAGE GIVEN TO DATE: 8.29 GY
RAD ONC ARIA REFERENCE POINT ID: NORMAL
RAD ONC ARIA REFERENCE POINT ID: NORMAL
RAD ONC ARIA REFERENCE POINT SESSION DOSAGE GIVEN: 2 GY
RAD ONC ARIA REFERENCE POINT SESSION DOSAGE GIVEN: 2.07 GY

## 2022-08-09 PROCEDURE — 77412 RADIATION TX DELIVERY LVL 3: CPT | Performed by: RADIOLOGY

## 2022-08-09 PROCEDURE — FACE2FACE: Performed by: STUDENT IN AN ORGANIZED HEALTH CARE EDUCATION/TRAINING PROGRAM

## 2022-08-09 NOTE — PROGRESS NOTES
Physician Weekly Management Note    Diagnosis:     Diagnosis Plan   1. Malignant neoplasm of right breast in female, estrogen receptor positive, unspecified site of breast (HCC)         RT Details:    Radiation Treatments     Active   Plans   R Breast Bst   Most recent treatment: Dose planned: 200 cGy (fraction 4 on 8/9/2022)   Total: Dose planned: 1,000 cGy (5 fractions)   Elapsed Days: 26      Reference Points   R Breast Calc   Most recent treatment: Dose given: 266 cGy (on 8/3/2022)   Total: Dose given: 3,989 cGy   Elapsed Days: 20      RXPT R Breast   Most recent treatment: Dose given: 266 cGy (on 8/3/2022)   Total: Dose given: 3,990 cGy   Elapsed Days: 20      RXPT R Brst Bst   Most recent treatment: Dose given: 200 cGy (on 8/9/2022)   Total: Dose given: 800 cGy   Elapsed Days: 26      Rt Brst Bst Calc   Most recent treatment: Dose given: 207 cGy (on 8/9/2022)   Total: Dose given: 829 cGy   Elapsed Days: 26                   Notes on Treatment course, Films, Medical progress:  Moderate breast erythema, no dry or moist desquamation. Using radiation cream and aquaphor    Weekly Management:  Medication reviewed?   Yes  New medications given?   No  Problemlist reviewed?   Yes  Problem added?   No  Issues raised requiring referral to support services - task assigned to:  N/a    Technical aspects reviewed:  Weekly OBI approved?   Yes  Weekly port films approved?   Yes  Change requests noted on port film?   No  Patient setup and plan reviewed?   Yes    Chart Reviewed:  Continue current treatment plan?   Yes  Treatment plan change requested?   No  CBC reviewed?   Yes  Concurrent Chemo?   No    Objective     Toxicities:   Moderate right breast erythema, fatigue     Review of Systems   Constitutional: Positive for fatigue.   Skin: Positive for color change.          Vitals:    08/09/22 1517   BP: 164/77   Pulse: 78   SpO2: 100%       Current Status 5/26/2022   ECOG score 0       Physical Exam  Vitals reviewed.    Constitutional:       General: She is not in acute distress.     Appearance: Normal appearance.   HENT:      Head: Normocephalic and atraumatic.   Eyes:      Extraocular Movements: Extraocular movements intact.      Pupils: Pupils are equal, round, and reactive to light.   Pulmonary:      Effort: Pulmonary effort is normal.      Breath sounds: Normal breath sounds.   Abdominal:      General: Abdomen is flat.      Palpations: Abdomen is soft.   Musculoskeletal:      Cervical back: Normal range of motion and neck supple.   Skin:     General: Skin is warm and dry.      Comments: Moderate right breast erythema, no moist or dry desquamation   Neurological:      General: No focal deficit present.      Mental Status: She is alert and oriented to person, place, and time.   Psychiatric:         Mood and Affect: Mood normal.         Behavior: Behavior normal.             Problem Summary List    Diagnosis:     Diagnosis Plan   1. Malignant neoplasm of right breast in female, estrogen receptor positive, unspecified site of breast (HCC)       Pathology:   breast    Past Medical History:   Diagnosis Date   • Back pain     2005 had ruptured disc no surgery so prn c/o pain   • Breast cancer (HCC) 03/09/2022    Right breast invasive ductal adenocarcinoma, ER/TN positive, HER2 negative   • Carotid artery stenosis    • Cervical cancer (HCC)    • Colon polyp 08/20/2021    Rectum: hyperplastic polyp   • Diverticulosis    • Fibromuscular dysplasia (HCC)     dx 2 yrs ago   • H/O bronchitis    • Hyperlipidemia    • Loose stools    • Osteoporosis    • Positive colorectal cancer screening using Cologuard test    • Psoriasis          Past Surgical History:   Procedure Laterality Date   • BREAST BIOPSY Right 03/09/2022    US guided mammotome vaccum assisted right breast biopsy-Dr. Julián Palacios, [facility]   • BREAST LUMPECTOMY WITH SENTINEL NODE BIOPSY Right 04/28/2022    Procedure: RIGHT BREAST WIRE LOCALIZED LUMPECTOMY WITH SENTINEL NODE  BIOPSY;  Surgeon: Allie Bah MD;  Location:  [facility];  Service: General;  Laterality: Right;   • COLONOSCOPY N/A 08/20/2021    Procedure: COLONOSCOPY INTO CECUM AND TI WITH COLD BX POLYPECTOMIES;  Surgeon: Catia Kate MD;  Location: [facility] ENDOSCOPY;  Service: Gastroenterology;  Laterality: N/A;  PRE: POSITIVE COLOGUARD  POST: DIVERTICULOSIS, POLYPS, HEMORRHOIDS   • NV THROMBOENDARTECTMY NECK,NECK INCIS Left 09/14/2016    Procedure: LT CAROTID ENDARTERECTOMY WITH INTRA OPERATIVE CAROTID DUPLEX SCAN;  Surgeon: Arnulfo Hernandez MD;  Location: [facility] MAIN OR;  Service: Vascular   • SUBTOTAL HYSTERECTOMY Bilateral 09/1987    done at [facility], ovaries still in [facility]         Current Outpatient Medications on File Prior to Visit   Medication Sig Dispense Refill   • aspirin 81 MG EC tablet Take 81 mg by mouth Daily.     • atorvastatin (LIPITOR) 20 MG tablet Take 1 tablet by mouth Every Night. (Patient taking differently: Take 20 mg by mouth Every Night. Pharmacist said To call MD surgeon about telling pt to stop prior to surgery) 90 tablet 3   • Calcium Carbonate (CALCIUM 500 PO) Take 1 tablet by mouth Daily.     • Cholecalciferol (Vitamin D) 50 MCG (2000 UT) capsule Take 2,000 Units by mouth Daily.       No current facility-administered medications on file prior to visit.       No Known Allergies      Referring Provider:    No referring provider defined for this encounter.    Oncologist:  No primary care provider on file.      Seen and approved by:  Robin Resendez MD  08/09/2022

## 2022-08-10 PROCEDURE — 77412 RADIATION TX DELIVERY LVL 3: CPT | Performed by: RADIOLOGY

## 2022-08-10 PROCEDURE — 77387 GUIDANCE FOR RADJ TX DLVR: CPT | Performed by: RADIOLOGY

## 2022-08-11 LAB
RAD ONC ARIA COURSE END DATE: NORMAL
RAD ONC ARIA COURSE ID: NORMAL
RAD ONC ARIA COURSE INTENT: NORMAL
RAD ONC ARIA COURSE LAST TREATMENT DATE: NORMAL
RAD ONC ARIA COURSE START DATE: NORMAL
RAD ONC ARIA COURSE TREATMENT ELAPSED DAYS: 27
RAD ONC ARIA FIRST TREATMENT DATE: NORMAL
RAD ONC ARIA PLAN FRACTIONS TREATED TO DATE: 15
RAD ONC ARIA PLAN FRACTIONS TREATED TO DATE: 5
RAD ONC ARIA PLAN ID: NORMAL
RAD ONC ARIA PLAN ID: NORMAL
RAD ONC ARIA PLAN NAME: NORMAL
RAD ONC ARIA PLAN NAME: NORMAL
RAD ONC ARIA PLAN PRESCRIBED DOSE PER FRACTION: 2 GY
RAD ONC ARIA PLAN PRESCRIBED DOSE PER FRACTION: 2.66 GY
RAD ONC ARIA PLAN PRIMARY REFERENCE POINT: NORMAL
RAD ONC ARIA PLAN PRIMARY REFERENCE POINT: NORMAL
RAD ONC ARIA PLAN TOTAL FRACTIONS PRESCRIBED: 15
RAD ONC ARIA PLAN TOTAL FRACTIONS PRESCRIBED: 5
RAD ONC ARIA PLAN TOTAL PRESCRIBED DOSE: 1000 CGY
RAD ONC ARIA PLAN TOTAL PRESCRIBED DOSE: 3990 CGY
RAD ONC ARIA REFERENCE POINT DOSAGE GIVEN TO DATE: 10 GY
RAD ONC ARIA REFERENCE POINT DOSAGE GIVEN TO DATE: 10.36 GY
RAD ONC ARIA REFERENCE POINT DOSAGE GIVEN TO DATE: 39.89 GY
RAD ONC ARIA REFERENCE POINT DOSAGE GIVEN TO DATE: 39.9 GY
RAD ONC ARIA REFERENCE POINT ID: NORMAL

## 2022-08-14 NOTE — PROGRESS NOTES
General Surgery Breast Cancer History and Physical Exam     Summary:    Arline Zaragoza is a 67 y.o. lady who presents with a history of right breast invasive ductal carcinoma: Grade 3,  ER +/VA +, Her2 -; mE1oV5D2, Anatomic Stage 1A, Prognostic Stage 1A.      A multidisciplinary plan has been formulated for the patient:    (1) Breast Surgical Oncology:  -Invitae 9 panel genetic testing: negative   -s/p R breast wire localized lumpectomy with SLN biopsy 4/2022.   -Annual mammogram due 1/2023.   -Follow up 2/2023 with Alaina Bermudez.   -Discussed vitamin E oil and scar massage. Will consider lymphedema clinic if no improvement.    (2) Medical Oncology:  -Follows with Dr. Desai.  -Oncotype Dx 20.  -Plans to start endocrine therapy soon.     (3) Radiation Oncology:  -Completed adjuvant radiation therapy last week.     Referring Provider: No ref. provider found    Chief Complaint: abnormal breast imaging    History of Present Illness: Ms. Arline Zaragoza is a 67 y.o. year old lady, seen at the request of No ref. provider found for a new diagnosis of right breast cancer.      This was initially detected as an imaging abnormality. She has not had annual mammograms each year and skipped many years. Her most recent prior mammogram was at least 10 years ago. She denies any prior history of abnormal mammograms or breast biopsies. Her work-up is detailed in the oncologic history below.     She denies any palpable breast lumps, pain, skin changes, or nipple discharge. She denies any family history of breast or ovarian cancer.     5/16/2022 she is doing well since surgery with no complaints or problems.  She did have a little leaking from her incision last night but that was the only episode and it is now stopped.  She is getting back to her daily activities.    8/15/2022 She presents today for follow up. She completed radiation last week. She complains of some axillary pain. She had fatigue and aches during radiation.     Workup  of Current Diagnosis:    01/17/2022 - Bilateral Screening Mammogram:  Right breast  Impression: Architectural distortion.  BIRADS 0    02/15/2022 - Diagnostic Mammogram with Ultrasound:   Right breast - There is a persistent area of architecture distortion in the upper central posterior right breast on ultrasound. At 11:30, 4 cm from the nipple, there is an incidental 0.7 cm benign appearing cluster cyst. At 12:00, 4 cm from the nipple, there is a vague regular hypoechoic mass area of shadowing measuring 0.7 x 0.6 x 0.6 cm in that region.  Impression: Suspicious, recommend stereotactic biopsy.  BIRADS 4    Pathology:   Pathology: Right breast 12:00, 4 cm from the nipple invasive ductal carcinoma, grade 2, measures up to 6.5 mm, ER positive, MO positive, HER2 negative, Ki-67 3%.    3/16/2022 Bilateral Breast MRI:   IMPRESSION:   IMPRESSION:  1. Biopsy-proven malignancy in the right breast in the posterior one-third at the 12-o'clock position measuring on the order of 1.1 cm in greatest dimension with the bowtie-shaped metallic clip positioned on the order of 0.7 cm superior and slightly medial to the epicenter of the suspected residual malignancy within a hematoma cavity that measures on the order of 2 cm in greatest dimension. No other suspicious findings are seen within the right breast and there is no evidence for right axillary adenopathy.  2. There are no findings suspicious for malignancy in the left breast.   BI-RADS category 6: Known biopsy-proven malignancy.    4/28/2022 Right breast wire localized lumpectomy with sentinel lymph node biopsy  Final Diagnosis   1. Right Breast, Oriented Needle Localization Lumpectomy (13 grams):  INVASIVE MAMMARY CARCINOMA, NO      SPECIAL TYPE (INVASIVE DUCTAL CARCINOMA).               A. Tumor site:  12:00 o'clock.               B. Histologic Grade:  Mobile.               C. Histologic Score:  II (tubules = 3, nuclei = 2, mitoses = 1).               D. Tumor size:  5 x 5  x 5 mm.               E. Tumor focality:  Single focus.               F. No definitive in-situ component identified.                G. No lobular neoplasia identified.               H. No lymphovascular nor perineural invasion identified.               I. Margins:  Uninvolved by invasive carcinoma.                            1. Invasive carcinoma is present 2.4 mm from the original inferior margin, 5 mm from the medial margin,                                10 mm from the superior margin, 1 mm from the anterior, 18 mm from the posterior margin and 20 mm                                lateral margin of resection in specimen 1.               J. Lymph node:  Seven total lymph nodes, negative for metastatic carcinoma (0/7) (specimens 6-8).               K. Hormone receptor status:  ER positive (99%), CT positive (25%), HER-2 negative (1+), Ki67 3% (performed on                   prior biopsy [id number]).               L. Pathologic stage:  pT1a,N0.     2. Right Breast, Additional Superior Margin, Oriented Excision:                 A. Benign unremarkable breast tissue.               B. Final superior margin free by an additional 7 mm.       3. Right Breast, Additional Lateral Margin, Oriented Excision:               A. Benign breast tissue with clustered apocrine cyst.               B. Final lateral margin free by an additional 12 mm.     4. Right Breast, Additional Medial Margin, Oriented Excision:               A. Benign unremarkable breast tissue.               B. Final medial margin free by an additional 6 mm.     5. Right Breast, Additional Inferior Margin, Oriented Excision:               A. Benign breast tissue with focal usual ductal hyperplasia.               B. No atypical hyperplasia, in-situ nor invasive carcinoma identified.                 C. Final inferior margin free by an additional 6 mm.       6. [place] Lymph Node #1, Right Axilla, Excision:                 A. Five lymph nodes, negative for metastatic  carcinoma (0/5).     7. [place] Lymph Node #2, Right Axilla, Excision:               A. One lymph node, negative for metastatic carcinoma (0/1).     8. [place] Lymph Node #3, Right Axilla, Excision:                 A. One lymph node, negative for metastatic carcinoma (0/1).     Gynecologic History:   . P:2 AB:0  Age at first childbirth: 28 or 29  Lactation/How long: Did not breastfeed.  Age at menarche: 15  Age at menopause: 45  Total years of oral contraceptive use: 2  Total years of hormone replacement therapy: 0    Past Medical History:   Past Medical History:   Diagnosis Date   • Back pain      had ruptured disc no surgery so prn c/o pain   • Breast cancer (HCC) 2022    Right breast invasive ductal adenocarcinoma, ER/UT positive, HER2 negative   • Carotid artery stenosis    • Cervical cancer (HCC)    • Colon polyp 2021    Rectum: hyperplastic polyp   • Diverticulosis    • Fibromuscular dysplasia (HCC)     dx 2 yrs ago   • H/O bronchitis    • Hyperlipidemia    • Loose stools    • Osteoporosis    • Positive colorectal cancer screening using Cologuard test    • Psoriasis        Past Surgical History:    Past Surgical History:   Procedure Laterality Date   • BREAST BIOPSY Right 2022    US guided mammotome vaccum assisted right breast biopsy-Dr. Julián Palacios, [facility]   • BREAST LUMPECTOMY WITH SENTINEL NODE BIOPSY Right 2022    Procedure: RIGHT BREAST WIRE LOCALIZED LUMPECTOMY WITH SENTINEL NODE BIOPSY;  Surgeon: Allie Bah MD;  Location[place] [facility];  Service: General;  Laterality: Right;   • COLONOSCOPY N/A 2021    Procedure: COLONOSCOPY INTO CECUM AND TI WITH COLD BX POLYPECTOMIES;  Surgeon: Catia Kate MD;  Location: [facility] ENDOSCOPY;  Service: Gastroenterology;  Laterality: N/A;  PRE: POSITIVE COLOGUARD  POST: DIVERTICULOSIS, POLYPS, HEMORRHOIDS   • UT THROMBOENDARTECTMY NECK,NECK INCIS Left 2016    Procedure: LT CAROTID ENDARTERECTOMY WITH INTRA OPERATIVE  CAROTID DUPLEX SCAN;  Surgeon: Arnulfo Hernandez MD;  Location: [facility] OR;  Service: Vascular   • SUBTOTAL HYSTERECTOMY Bilateral 09/1987    done at [facility], ovaries still in tact       Family History:    Family History   Problem Relation Age of Onset   • Vision loss Mother    • Skin cancer Father    • Skin cancer Sister    • Depression Daughter    • Malig Hyperthermia Neg Hx    • Breast cancer Neg Hx    • Ovarian cancer Neg Hx      Social History:  • Smokes cigarettes, 1 pack per day  • Occasional alcohol use    Allergies:   No Known Allergies    Medications:     Current Outpatient Medications:   •  aspirin 81 MG EC tablet, Take 81 mg by mouth Daily., Disp: , Rfl:   •  atorvastatin (LIPITOR) 20 MG tablet, Take 1 tablet by mouth Every Night. (Patient taking differently: Take 20 mg by mouth Every Night. Pharmacist said To call MD surgeon about telling pt to stop prior to surgery), Disp: 90 tablet, Rfl: 3  •  Calcium Carbonate (CALCIUM 500 PO), Take 1 tablet by mouth Daily., Disp: , Rfl:   •  Cholecalciferol (Vitamin D) 50 MCG (2000 UT) capsule, Take 2,000 Units by mouth Daily., Disp: , Rfl:     Laboratory Values:    Labs from 05/2022 reviewed    Review of Systems:   Influenza-like illness: no fever, no  cough, no  sore throat, no  body aches, no loss of sense of taste or smell, no known exposure to person with Covid-19.  Constitutional: Negative for fevers or chills  HENT: Negative for hearing loss or runny nose  Eyes: Negative for vision changes or scleral icterus  Respiratory: Negative for cough or shortness of breath  Cardiovascular: Negative for chest pain or heart palpitations  Gastrointestinal: Negative for abdominal pain, nausea, vomiting, constipation, melena, or hematochezia  Genitourinary: Negative for hematuria or dysuria  Musculoskeletal: Negative for joint swelling or gait instability  Neurologic: Negative for tremors or seizures  Psychiatric: Negative for suicidal ideations or depression  All other  systems reviewed and negative    Physical Exam:   • ECO - Asymptomatic  • Constitutional: Well-developed well-nourished, no acute distress  • Eyes: Conjunctiva normal, sclera nonicteric  • ENMT: Hearing grossly normal, oral mucosa moist  • Neck: Supple, no palpable mass, trachea midline  • Respiratory: Clear to auscultation, normal inspiratory effort  • Cardiovascular: Regular rate, no peripheral edema, no jugular venous distention  • Breast: symmetric,   o Right: No visible abnormalities on inspection while seated, with arms raised or hands on hips. No masses, skin changes, or nipple abnormalities.  Right breast incision clean and dry, some contraction at the superior NAC, slight skin darkening, axillary incision well healed  o Left:  No visible abnormalities on inspection while seated, with arms raised or hands on hips. No masses, skin changes, or nipple abnormalities.  o No clinical chest wall involvement.  • Gastrointestinal: Soft, nontender  • Lymphatics (palpable nodes): No cervical, supraclavicular or axillary lymphadenopathy  • Skin:  Warm, dry, no rash on visualized skin surfaces  • Musculoskeletal: Symmetric strength, normal gait  • Psychiatric: Alert and oriented ×3, normal affect     AGUSTINA BRUSH M.D.  General and Endoscopic Surgery  [facility]    [address], [address]  , [address]  P: [phone]  F: [phone]

## 2022-08-15 ENCOUNTER — OFFICE VISIT (OUTPATIENT)
Dept: INTERNAL MEDICINE | Facility: CLINIC | Age: 67
End: 2022-08-15

## 2022-08-15 ENCOUNTER — OFFICE VISIT (OUTPATIENT)
Dept: SURGERY | Facility: CLINIC | Age: 67
End: 2022-08-15

## 2022-08-15 VITALS — BODY MASS INDEX: 20.33 KG/M2 | HEIGHT: 65 IN | WEIGHT: 122 LBS

## 2022-08-15 VITALS
RESPIRATION RATE: 18 BRPM | BODY MASS INDEX: 21 KG/M2 | DIASTOLIC BLOOD PRESSURE: 82 MMHG | WEIGHT: 123 LBS | SYSTOLIC BLOOD PRESSURE: 177 MMHG | OXYGEN SATURATION: 97 % | HEIGHT: 64 IN | TEMPERATURE: 97.7 F | HEART RATE: 83 BPM

## 2022-08-15 DIAGNOSIS — C50.911 MALIGNANT NEOPLASM OF RIGHT BREAST IN FEMALE, ESTROGEN RECEPTOR POSITIVE, UNSPECIFIED SITE OF BREAST: Primary | ICD-10-CM

## 2022-08-15 DIAGNOSIS — C50.911 MALIGNANT NEOPLASM OF RIGHT BREAST IN FEMALE, ESTROGEN RECEPTOR POSITIVE, UNSPECIFIED SITE OF BREAST: Chronic | ICD-10-CM

## 2022-08-15 DIAGNOSIS — Z98.890 S/P CAROTID ENDARTERECTOMY: Chronic | ICD-10-CM

## 2022-08-15 DIAGNOSIS — Z17.0 MALIGNANT NEOPLASM OF RIGHT BREAST IN FEMALE, ESTROGEN RECEPTOR POSITIVE, UNSPECIFIED SITE OF BREAST: Chronic | ICD-10-CM

## 2022-08-15 DIAGNOSIS — I10 PRIMARY HYPERTENSION: Chronic | ICD-10-CM

## 2022-08-15 DIAGNOSIS — M81.0 OSTEOPOROSIS, UNSPECIFIED OSTEOPOROSIS TYPE, UNSPECIFIED PATHOLOGICAL FRACTURE PRESENCE: Chronic | ICD-10-CM

## 2022-08-15 DIAGNOSIS — Z72.0 TOBACCO ABUSE: Chronic | ICD-10-CM

## 2022-08-15 DIAGNOSIS — Z23 ENCOUNTER FOR IMMUNIZATION: ICD-10-CM

## 2022-08-15 DIAGNOSIS — Z00.00 ENCOUNTER FOR MEDICAL EXAMINATION TO ESTABLISH CARE: Primary | ICD-10-CM

## 2022-08-15 DIAGNOSIS — E78.2 MIXED HYPERLIPIDEMIA: Chronic | ICD-10-CM

## 2022-08-15 DIAGNOSIS — Z17.0 MALIGNANT NEOPLASM OF RIGHT BREAST IN FEMALE, ESTROGEN RECEPTOR POSITIVE, UNSPECIFIED SITE OF BREAST: Primary | ICD-10-CM

## 2022-08-15 DIAGNOSIS — Z48.812 ENCOUNTER FOR SURGICAL AFTERCARE FOLLOWING SURGERY ON THE CIRCULATORY SYSTEM: ICD-10-CM

## 2022-08-15 PROBLEM — R19.5 POSITIVE COLORECTAL CANCER SCREENING USING COLOGUARD TEST: Status: RESOLVED | Noted: 2021-06-23 | Resolved: 2022-08-15

## 2022-08-15 PROCEDURE — 99215 OFFICE O/P EST HI 40 MIN: CPT | Performed by: NURSE PRACTITIONER

## 2022-08-15 PROCEDURE — 90677 PCV20 VACCINE IM: CPT | Performed by: NURSE PRACTITIONER

## 2022-08-15 PROCEDURE — G0009 ADMIN PNEUMOCOCCAL VACCINE: HCPCS | Performed by: NURSE PRACTITIONER

## 2022-08-15 PROCEDURE — 99212 OFFICE O/P EST SF 10 MIN: CPT | Performed by: STUDENT IN AN ORGANIZED HEALTH CARE EDUCATION/TRAINING PROGRAM

## 2022-08-15 RX ORDER — IRBESARTAN 150 MG/1
75 TABLET ORAL DAILY
Qty: 30 TABLET | Refills: 0 | Status: SHIPPED | OUTPATIENT
Start: 2022-08-15 | End: 2022-08-29 | Stop reason: SDUPTHER

## 2022-08-15 RX ORDER — TAMOXIFEN CITRATE 20 MG/1
1 TABLET ORAL DAILY
COMMUNITY
End: 2022-12-06

## 2022-08-15 NOTE — ASSESSMENT & PLAN NOTE
Hypertension is worsening.  Dietary sodium restriction.  Weight loss.  Regular aerobic exercise.  Stop smoking.  Medication changes per orders.  Blood pressure will be reassessed at the next regular appointment.

## 2022-08-15 NOTE — PROGRESS NOTES
"Chief Complaint  HTN/HLD    Subjective        Arline Zaragoza presents to [facility]  History of Present Illness  This is a 66 y/o female presenting to office for establishment of care and chronic care management. Patient is currently  and has 2 daughters. Patient is currently retired.     Patient reports walking as exercise. Patient reports following healthy diet.     Patient reports 1/2-1 pack smoker x 50 years. Patient reports she has tried multiple forms of cessation without success. Patient reports social alcohol use.     Patient reports hx of carotid endarectomy by Dr. Hernandez-- continues on aspirin and atorvastatin.     Patient does not follow with gynecology. Patient has hx of subtotal hysterectomy in 1987. Patient does not receive pap smears.     Patient reports hx of osteoporosis-- on calcium/vit d supplementation. Continues walking regularly.     Patient had recent colonoscopy due to positive cologuard test which showed polyps-- pathology came back noncancerous.     Patient also undergoing treatment for right breast cancer-- had lumpectomy along with radiation treatment. Following with Dr. Bah  And Dr. Delvalle.     Blood pressure elevated today-- Patient reports never taking blood pressure medication. Patient reports she will occasionally check BP at home-- elevated 180's over 80's. Patient denies any chest pain. Does report some occasional dizziness. Patient denies h/a.       Objective   Vital Signs:  /82 (BP Location: Left arm, Patient Position: Sitting, Cuff Size: Adult)   Pulse 83   Temp 97.7 °F (36.5 °C)   Resp 18   Ht 162.6 cm (64\")   Wt 55.8 kg (123 lb)   SpO2 97%   BMI 21.11 kg/m²   Estimated body mass index is 21.11 kg/m² as calculated from the following:    Height as of this encounter: 162.6 cm (64\").    Weight as of this encounter: 55.8 kg (123 lb).    BMI is within normal parameters. No other follow-up for BMI required.      Physical " Exam  Vitals and nursing note reviewed.   Constitutional:       Appearance: Normal appearance.   HENT:      Head: Normocephalic and atraumatic.      Mouth/Throat:      Mouth: Mucous membranes are moist.   Eyes:      Pupils: Pupils are equal, round, and reactive to light.   Cardiovascular:      Rate and Rhythm: Normal rate and regular rhythm.      Pulses: Normal pulses.      Heart sounds: Normal heart sounds. No murmur heard.    No friction rub. No gallop.   Pulmonary:      Effort: Pulmonary effort is normal. No respiratory distress.      Breath sounds: Normal breath sounds. No stridor. No wheezing, rhonchi or rales.   Abdominal:      General: Bowel sounds are normal. There is no distension.      Palpations: Abdomen is soft. There is no mass.      Tenderness: There is no abdominal tenderness.   Musculoskeletal:         General: No swelling or deformity. Normal range of motion.      Cervical back: Normal range of motion and neck supple.   Skin:     General: Skin is warm and dry.      Coloration: Skin is not jaundiced.      Findings: No bruising.   Neurological:      General: No focal deficit present.      Mental Status: She is alert and oriented to person, place, and time. Mental status is at baseline.      Motor: No weakness.   Psychiatric:         Mood and Affect: Mood normal.         Thought Content: Thought content normal.         Judgment: Judgment normal.        Result Review :  The following data was reviewed by: IGNACIO Lynch on 08/15/2022:  Common labs    Common Labsle 4/26/22 4/26/22 5/26/22 6/27/22    1353 1353     Glucose  104 (A)     BUN  11     Creatinine  0.65     Sodium  139     Potassium  3.9     Chloride  103     Calcium  9.6     WBC 8.36  10.76 8.71   Hemoglobin 14.8  14.9 15.3   Hematocrit 43.4  44.9 44.5   Platelets 163  193 141   (A) Abnormal value       Comments are available for some flowsheets but are not being displayed.                    Assessment and Plan   Diagnoses and all orders  for this visit:    1. Encounter for medical examination to establish care (Primary)    2. Malignant neoplasm of right breast in female, estrogen receptor positive, unspecified site of breast (HCC)  Assessment & Plan:  Patient continues following with Dr. Bah and Dr. Ashford.   Also following with CBC.  Starting tamoxifen 20mg.       3. Osteoporosis, unspecified osteoporosis type, unspecified pathological fracture presence  Assessment & Plan:  Continues on calcium and vit d.   Encouraged weight bearing exercise.      4. Encounter for immunization  -     Pneumococcal Conjugate Vaccine 20-Valent (PCV20)    5. S/P carotid endarterectomy  Assessment & Plan:  Continues on aspirin and lipitor.     Orders:  -     Duplex Carotid Ultrasound CAR; Future    6. Primary hypertension  Assessment & Plan:  Hypertension is worsening.  Dietary sodium restriction.  Weight loss.  Regular aerobic exercise.  Stop smoking.  Medication changes per orders.  Blood pressure will be reassessed at the next regular appointment.      7. Mixed hyperlipidemia  Assessment & Plan:  Lipid abnormalities are improving with treatment.  Nutritional counseling was provided. and Pharmacotherapy as ordered.  Lipids will be reassessed in 6 months.    Orders:  -     Abdominal Aortic Aneurysm Screening Medicare CAR; Future    8. Encounter for surgical aftercare following surgery on the circulatory system   -     Duplex Carotid Ultrasound CAR; Future    9. Tobacco abuse  Assessment & Plan:  Would like to think more about wellbutrin. Uninterested in starting cessation methods today.       Other orders  -     irbesartan (Avapro) 150 MG tablet; Take 0.5 tablets by mouth Daily.  Dispense: 30 tablet; Refill: 0          I spent 40 minutes caring for Arline on this date of service. This time includes time spent by me in the following activities:preparing for the visit, reviewing tests, obtaining and/or reviewing a separately obtained history, performing a medically  appropriate examination and/or evaluation , counseling and educating the patient/family/caregiver, ordering medications, tests, or procedures, documenting information in the medical record and care coordination  Follow Up   Return in about 2 weeks (around 8/29/2022) for Recheck for HTN.  Patient was given instructions and counseling regarding her condition or for health maintenance advice. Please see specific information pulled into the AVS if appropriate.

## 2022-08-15 NOTE — ASSESSMENT & PLAN NOTE
Patient continues following with Dr. Bah and Dr. Ashford.   Also following with CBC.  Starting tamoxifen 20mg.

## 2022-08-23 ENCOUNTER — HOSPITAL ENCOUNTER (OUTPATIENT)
Dept: CARDIOLOGY | Facility: HOSPITAL | Age: 67
Discharge: HOME | End: 2022-08-23
Admitting: NURSE PRACTITIONER

## 2022-08-23 DIAGNOSIS — Z48.812 ENCOUNTER FOR SURGICAL AFTERCARE FOLLOWING SURGERY ON THE CIRCULATORY SYSTEM: ICD-10-CM

## 2022-08-23 DIAGNOSIS — Z98.890 S/P CAROTID ENDARTERECTOMY: ICD-10-CM

## 2022-08-23 DIAGNOSIS — I65.22 ICAO (INTERNAL CAROTID ARTERY OCCLUSION), LEFT: Primary | ICD-10-CM

## 2022-08-23 LAB
BH CV XLRA MEAS LEFT DIST CCA PSV: 0 CM/SEC
BH CV XLRA MEAS LEFT DIST ICA PSV: 0 CM/SEC
BH CV XLRA MEAS LEFT MID ICA PSV: 0 CM/SEC
BH CV XLRA MEAS LEFT PROX CCA PSV: 0 CM/SEC
BH CV XLRA MEAS LEFT PROX ECA EDV: -23.6 CM/SEC
BH CV XLRA MEAS LEFT PROX ECA PSV: -90.4 CM/SEC
BH CV XLRA MEAS LEFT PROX ICA PSV: 0 CM/SEC
BH CV XLRA MEAS LEFT PROX SCLA PSV: 143 CM/SEC
BH CV XLRA MEAS LEFT VERTEBRAL A EDV: 22.3 CM/SEC
BH CV XLRA MEAS LEFT VERTEBRAL A PSV: 70.4 CM/SEC
BH CV XLRA MEAS RIGHT DIST CCA EDV: -36.3 CM/SEC
BH CV XLRA MEAS RIGHT DIST CCA PSV: -112 CM/SEC
BH CV XLRA MEAS RIGHT DIST ICA EDV: -39.3 CM/SEC
BH CV XLRA MEAS RIGHT DIST ICA PSV: -106 CM/SEC
BH CV XLRA MEAS RIGHT ICA/CCA RATIO: 0.96
BH CV XLRA MEAS RIGHT MID ICA EDV: -37.3 CM/SEC
BH CV XLRA MEAS RIGHT MID ICA PSV: -108 CM/SEC
BH CV XLRA MEAS RIGHT PROX CCA EDV: 36.3 CM/SEC
BH CV XLRA MEAS RIGHT PROX CCA PSV: 167 CM/SEC
BH CV XLRA MEAS RIGHT PROX ECA EDV: -31.4 CM/SEC
BH CV XLRA MEAS RIGHT PROX ECA PSV: -156 CM/SEC
BH CV XLRA MEAS RIGHT PROX ICA EDV: -32.4 CM/SEC
BH CV XLRA MEAS RIGHT PROX ICA PSV: -106 CM/SEC
BH CV XLRA MEAS RIGHT PROX SCLA PSV: 144 CM/SEC
BH CV XLRA MEAS RIGHT VERTEBRAL A EDV: 24.6 CM/SEC
BH CV XLRA MEAS RIGHT VERTEBRAL A PSV: 75.6 CM/SEC
LEFT ARM BP: NORMAL MMHG
MAXIMAL PREDICTED HEART RATE: 154 BPM
RIGHT ARM BP: NORMAL MMHG
STRESS TARGET HR: 131 BPM

## 2022-08-23 PROCEDURE — 93880 EXTRACRANIAL BILAT STUDY: CPT

## 2022-08-23 NOTE — PROGRESS NOTES
Please let patient know--  Right internal carotid shows some stenosis between 16-49 percent. Recommend to continue on Lipitor and aspirin.   However, left ICA shows some occlusion. Would recommend f/u with vascular-- I will place referral.

## 2022-08-29 ENCOUNTER — OFFICE VISIT (OUTPATIENT)
Dept: INTERNAL MEDICINE | Facility: CLINIC | Age: 67
End: 2022-08-29

## 2022-08-29 VITALS
TEMPERATURE: 98.6 F | WEIGHT: 124 LBS | BODY MASS INDEX: 20.66 KG/M2 | OXYGEN SATURATION: 99 % | HEART RATE: 64 BPM | SYSTOLIC BLOOD PRESSURE: 142 MMHG | DIASTOLIC BLOOD PRESSURE: 70 MMHG | HEIGHT: 65 IN

## 2022-08-29 DIAGNOSIS — I10 PRIMARY HYPERTENSION: Primary | ICD-10-CM

## 2022-08-29 PROCEDURE — 99213 OFFICE O/P EST LOW 20 MIN: CPT | Performed by: NURSE PRACTITIONER

## 2022-08-29 RX ORDER — IRBESARTAN 150 MG/1
150 TABLET ORAL DAILY
Qty: 30 TABLET | Refills: 1 | Status: SHIPPED | OUTPATIENT
Start: 2022-08-29 | End: 2022-11-17

## 2022-08-29 NOTE — PROGRESS NOTES
"Chief Complaint  Hypertension    Subjective        Arline Zaragoza presents to [facility] PRIMARY [facility]ARE  History of Present Illness  This is a 68 y/o female presenting to office for f/u with HTN. Patient has been taking avapro 75mg tablet daily. Patient reports she has not had any chest pain or heart palpitations. Patient has been taking BP daily-- averaging 140's-150's/80's-90's.     Patient is scheduled to see vascular on 9/20/22-- patient has hx of carotid stenosis with hx of endarectomy to left ICA.     Objective   Vital Signs:  /70   Pulse 64   Temp 98.6 °F (37 °C)   Ht 165.1 cm (65\")   Wt 56.2 kg (124 lb)   SpO2 99%   BMI 20.63 kg/m²   Estimated body mass index is 20.63 kg/m² as calculated from the following:    Height as of this encounter: 165.1 cm (65\").    Weight as of this encounter: 56.2 kg (124 lb).    BMI is within normal parameters. No other follow-up for BMI required.      Physical Exam  Vitals and nursing note reviewed.   Constitutional:       Appearance: Normal appearance.   HENT:      Head: Normocephalic and atraumatic.   Eyes:      Extraocular Movements: Extraocular movements intact.      Pupils: Pupils are equal, round, and reactive to light.   Cardiovascular:      Rate and Rhythm: Normal rate and regular rhythm.      Pulses: Normal pulses.      Heart sounds: Normal heart sounds. No murmur heard.    No friction rub. No gallop.   Pulmonary:      Effort: Pulmonary effort is normal. No respiratory distress.      Breath sounds: Normal breath sounds. No wheezing or rales.   Musculoskeletal:         General: No swelling or deformity. Normal range of motion.      Cervical back: Normal range of motion and neck supple.   Skin:     General: Skin is warm and dry.      Coloration: Skin is not jaundiced.      Findings: No bruising.   Neurological:      Mental Status: She is alert and oriented to person, place, and time. Mental status is at baseline.      Motor: No weakness. "   Psychiatric:         Mood and Affect: Mood normal.         Thought Content: Thought content normal.         Judgment: Judgment normal.        Result Review :  The following data was reviewed by: IGNACIO Lynch on 08/29/2022:  Common labs    Common Labsle 4/26/22 4/26/22 5/26/22 6/27/22    1353 1353     Glucose  104 (A)     BUN  11     Creatinine  0.65     Sodium  139     Potassium  3.9     Chloride  103     Calcium  9.6     WBC 8.36  10.76 8.71   Hemoglobin 14.8  14.9 15.3   Hematocrit 43.4  44.9 44.5   Platelets 163  193 141   (A) Abnormal value       Comments are available for some flowsheets but are not being displayed.                    Assessment and Plan   Diagnoses and all orders for this visit:    1. Primary hypertension (Primary)  Assessment & Plan:  Hypertension is improving with treatment.  Continue current treatment regimen.  Dietary sodium restriction.  Regular aerobic exercise.  Continue current medications.  Blood pressure will be reassessed at the next regular appointment.    Orders:  -     irbesartan (Avapro) 150 MG tablet; Take 1 tablet by mouth Daily.  Dispense: 30 tablet; Refill: 1           Follow Up   Return in about 4 months (around 12/29/2022) for Recheck HTN/HLD.  Patient was given instructions and counseling regarding her condition or for health maintenance advice. Please see specific information pulled into the AVS if appropriate.

## 2022-08-31 ENCOUNTER — NURSE NAVIGATOR (OUTPATIENT)
Dept: OTHER | Facility: HOSPITAL | Age: 67
End: 2022-08-31

## 2022-08-31 NOTE — PROGRESS NOTES
Attempted to contact patient x2 for f/u telephone visit. Left voice messages but no return calls at this time. Patient is ready for survivorship program. Information regarding survivorship program mailed to patient so she can schedule an appointment. Message sent to Linda/IGNACIO to notify. No additional needs noted. Will sign off.....Rashida Solorio RN, Oncology Nurse Navigator

## 2022-09-06 ENCOUNTER — PATIENT MESSAGE (OUTPATIENT)
Dept: INTERNAL MEDICINE | Facility: CLINIC | Age: 67
End: 2022-09-06

## 2022-09-07 ENCOUNTER — APPOINTMENT (OUTPATIENT)
Dept: RADIATION ONCOLOGY | Facility: HOSPITAL | Age: 67
End: 2022-09-07

## 2022-09-07 ENCOUNTER — OFFICE VISIT (OUTPATIENT)
Dept: RADIATION ONCOLOGY | Facility: HOSPITAL | Age: 67
End: 2022-09-07

## 2022-09-07 VITALS
HEART RATE: 70 BPM | SYSTOLIC BLOOD PRESSURE: 125 MMHG | BODY MASS INDEX: 20.53 KG/M2 | WEIGHT: 123.4 LBS | DIASTOLIC BLOOD PRESSURE: 82 MMHG | OXYGEN SATURATION: 94 %

## 2022-09-07 DIAGNOSIS — C50.911 MALIGNANT NEOPLASM OF RIGHT BREAST IN FEMALE, ESTROGEN RECEPTOR POSITIVE, UNSPECIFIED SITE OF BREAST: Primary | ICD-10-CM

## 2022-09-07 DIAGNOSIS — Z17.0 MALIGNANT NEOPLASM OF RIGHT BREAST IN FEMALE, ESTROGEN RECEPTOR POSITIVE, UNSPECIFIED SITE OF BREAST: Primary | ICD-10-CM

## 2022-09-07 PROCEDURE — G0463 HOSPITAL OUTPT CLINIC VISIT: HCPCS | Performed by: RADIOLOGY

## 2022-09-07 PROCEDURE — 99212-NC PR NO CHARGE CBC OFFICE OUTPATIENT VISIT 10 MINUTES: Performed by: RADIOLOGY

## 2022-09-07 NOTE — PROGRESS NOTES
Subjective     Linda Delvalle MD    Cancer Staging  Stage IA (pT1a, pN0(sn), cM0, G2, ER+, HI+, HER2-)   No chief complaint on file.   4 week follow up                                S:    I had the pleasure of seeing Arline Zaragoza  [facility].  She is a pleasant 67 year old female with T1aN0 right breast cancer. She returns today for follow up now 4 weeks out from completion of her radiation therapy to the breast.  She completed a dose of 3990cGy in 15 fractions delivered with 6 MV tangential photon field followed by a tumor bed rufus of 1000cGy in 5 fractions delivered with 6 MV photons on 8/10/22.  She has been doing well since I last saw her.      Review of Systems   Constitutional: Negative.    Skin: Positive for color change.         Past Medical History:   Diagnosis Date   • Back pain     2005 had ruptured disc no surgery so prn c/o pain   • Breast cancer (HCC) 03/09/2022    Right breast invasive ductal adenocarcinoma, ER/HI positive, HER2 negative   • Carotid artery stenosis    • Cervical cancer (HCC)    • Colon polyp 08/20/2021    Rectum: hyperplastic polyp   • Diverticulosis    • Fibromuscular dysplasia (HCC)     dx 2 yrs ago   • H/O bronchitis    • Hyperlipidemia    • Loose stools    • Osteoporosis    • Positive colorectal cancer screening using Cologuard test    • Psoriasis          Past Surgical History:   Procedure Laterality Date   • BREAST BIOPSY Right 03/09/2022    US guided mammotome vaccum assisted right breast biopsy-Dr. Julián Palacios, [facility]   • BREAST LUMPECTOMY WITH SENTINEL NODE BIOPSY Right 04/28/2022    Procedure: RIGHT BREAST WIRE LOCALIZED LUMPECTOMY WITH SENTINEL NODE BIOPSY;  Surgeon: Allie Bah MD;  Location: [facility] OR [facility] [place];  Service: General;  Laterality: Right;   • COLONOSCOPY N/A 08/20/2021    Procedure: COLONOSCOPY INTO CECUM AND TI WITH COLD BX POLYPECTOMIES;  Surgeon: Catia Kate MD;  Location: [facility] ENDOSCOPY;  Service: Gastroenterology;   Laterality: N/A;  PRE: POSITIVE COLOGUARD  POST: DIVERTICULOSIS, POLYPS, HEMORRHOIDS   • ND THROMBOENDARTECTMY NECK,NECK INCIS Left 09/14/2016    Procedure: LT CAROTID ENDARTERECTOMY WITH INTRA OPERATIVE CAROTID DUPLEX SCAN;  Surgeon: Arnulfo Hernandez MD;  Location: [facility] OR;  Service: Vascular   • SUBTOTAL HYSTERECTOMY Bilateral 09/1987    done at [facility], ovaries still in tact         Social History     Socioeconomic History   • Marital status: Single   Tobacco Use   • Smoking status: Current Every Day Smoker     Packs/day: 1.00     Years: 40.00     Pack years: 40.00     Types: Cigarettes     Start date: 1/1/1982   • Smokeless tobacco: Never Used   • Tobacco comment: 1/2 PPD   Vaping Use   • Vaping Use: Never used   Substance and Sexual Activity   • Alcohol use: Yes     Alcohol/week: 6.0 standard drinks     Types: 2 Glasses of wine, 4 Cans of beer per week   • Drug use: Never   • Sexual activity: Not Currently         Family History   Problem Relation Age of Onset   • Vision loss Mother    • Skin cancer Father    • Skin cancer Sister    • Depression Daughter    • Malig Hyperthermia Neg Hx    • Breast cancer Neg Hx    • Ovarian cancer Neg Hx           Objective   kps 90%   Physical Exam  Constitutional:       Appearance: Normal appearance.   Chest:          Comments: Mild hyperpigmentation over right breast, slight retraction of periareolar incision  Neurological:      Mental Status: She is alert.           Current Outpatient Medications on File Prior to Visit   Medication Sig Dispense Refill   • aspirin 81 MG EC tablet Take 81 mg by mouth Daily.     • atorvastatin (LIPITOR) 20 MG tablet Take 1 tablet by mouth Every Night. 90 tablet 3   • Calcium Carbonate (CALCIUM 500 PO) Take 1 tablet by mouth Daily.     • Cholecalciferol (Vitamin D) 50 MCG (2000 UT) capsule Take 2,000 Units by mouth Daily.     • irbesartan (Avapro) 150 MG tablet Take 1 tablet by mouth Daily. 30 tablet 1   • tamoxifen (NOLVADEX) 20 MG  chemo tablet Take 1 tablet by mouth Daily.       No current facility-administered medications on file prior to visit.       ALLERGIES:  No Known Allergies    There were no vitals taken for this visit.     Current Status 5/26/2022   ECOG score 0         Assessment & Plan     67 year old female with pT1aN0 right breast cancer, ERPR pos Her 2 negative now 4 weeks out from radiation.  She will due for her yearly mammogram in January 2023 and follow up with  shortly after.  She will have regular follow up with her medical oncologist, Dr. Desai.  I will see her back in one year for follow up.  She knows to call for this appointment.             Thank you very much for allowing me to participate in the care of this very pleasant patient.    Sincerely,      Linda Delvalle MD     Subjective     Linda Delvalle MD    Cancer Staging

## 2022-09-07 NOTE — TELEPHONE ENCOUNTER
From: Arline Zaragoza  To: IGNACIO Lynch  Sent: 9/6/2022 1:18 PM EDT  Subject: BP for the week    9/1: 143/81, 9/2: 136/74, 9/3: 131/67, 9/4: 136/75, 9/5: 152/79

## 2022-09-13 ENCOUNTER — DOCUMENTATION (OUTPATIENT)
Dept: RADIATION ONCOLOGY | Facility: HOSPITAL | Age: 67
End: 2022-09-13

## 2022-09-13 DIAGNOSIS — Z17.0 MALIGNANT NEOPLASM OF RIGHT BREAST IN FEMALE, ESTROGEN RECEPTOR POSITIVE, UNSPECIFIED SITE OF BREAST: Primary | ICD-10-CM

## 2022-09-13 DIAGNOSIS — C50.911 MALIGNANT NEOPLASM OF RIGHT BREAST IN FEMALE, ESTROGEN RECEPTOR POSITIVE, UNSPECIFIED SITE OF BREAST: Primary | ICD-10-CM

## 2022-09-13 NOTE — TELEPHONE ENCOUNTER
Caller: Arline Zaragoza CARTER    Relationship: Self    Best call back number: [phone]     Requested Prescriptions:   Requested Prescriptions     Pending Prescriptions Disp Refills   • atorvastatin (LIPITOR) 20 MG tablet 90 tablet 3     Sig: Take 1 tablet by mouth Every Night.        Pharmacy where request should be sent: [facility] [address]  [address] [facility] [address] ([facility] [phone] [facility] [phone] FX     Additional details provided by patient: PATIENT HAS 2 DAYS LEFT     Does the patient have less than a 3 day supply:  [x] Yes  [] No    Yonathan Meeks Rep   09/13/22 09:45 EDT

## 2022-09-13 NOTE — PROGRESS NOTES
Subjective     No ref. provider found    Cancer Staging  Stage IA (pT1a, pN0(sn), cM0, G2, ER+, RI+, HER2-)    I am writing to let you know Arline Zaragoza has recently completed the radiation therapy portion of treatment for her right breast cancer, pT1aN0.  Her treatment course is summarized below:    Site Treated:   Right breast    Intent of treatment: postoperative/curative      Dates Of Treatment:  7/14/22-8/10/22    Total Dose and Treatment Technique:    She received a total dose of 3990cGy in 15 fractions delivered with 6mV tangential photon fields.  She then received a tumor bed boost for an additional 1000cGy in 5 fractions delivered with 6MV photons in 3D conformal arrangement.     Patient Tolerance and Response to Treatment:    She tolerated her  treatments well with no breaks in the course of treatment. She completed in 27 elapsed days.  She developed mild fatigue near the completion        Status of Tumor/Patient at Completion of Therapy:  kps 90%, no evidence of disease    Disposition:  Follow up 4 weeks or sooner if necessary.  I have also placed a referral to the Survivorship clinic.

## 2022-09-14 RX ORDER — ATORVASTATIN CALCIUM 20 MG/1
20 TABLET, FILM COATED ORAL NIGHTLY
Qty: 90 TABLET | Refills: 3 | Status: SHIPPED | OUTPATIENT
Start: 2022-09-14

## 2022-09-15 DIAGNOSIS — Z72.0 TOBACCO ABUSE: Primary | ICD-10-CM

## 2022-09-20 ENCOUNTER — TRANSCRIBE ORDERS (OUTPATIENT)
Dept: ADMINISTRATIVE | Facility: HOSPITAL | Age: 67
End: 2022-09-20

## 2022-09-20 DIAGNOSIS — I65.23 CAROTID STENOSIS, BILATERAL: Primary | ICD-10-CM

## 2022-10-04 ENCOUNTER — HOSPITAL ENCOUNTER (OUTPATIENT)
Dept: ULTRASOUND IMAGING | Facility: HOSPITAL | Age: 67
Discharge: HOME | End: 2022-10-04
Admitting: NURSE PRACTITIONER

## 2022-10-04 DIAGNOSIS — Z72.0 TOBACCO ABUSE: ICD-10-CM

## 2022-10-04 PROCEDURE — 76706 US ABDL AORTA SCREEN AAA: CPT

## 2022-10-06 ENCOUNTER — OFFICE VISIT (OUTPATIENT)
Dept: ONCOLOGY | Facility: CLINIC | Age: 67
End: 2022-10-06

## 2022-10-06 ENCOUNTER — LAB (OUTPATIENT)
Dept: LAB | Facility: HOSPITAL | Age: 67
End: 2022-10-06

## 2022-10-06 VITALS
HEART RATE: 76 BPM | TEMPERATURE: 97.5 F | SYSTOLIC BLOOD PRESSURE: 146 MMHG | WEIGHT: 121.5 LBS | RESPIRATION RATE: 16 BRPM | BODY MASS INDEX: 20.24 KG/M2 | OXYGEN SATURATION: 98 % | DIASTOLIC BLOOD PRESSURE: 78 MMHG | HEIGHT: 65 IN

## 2022-10-06 DIAGNOSIS — C50.919 MALIGNANT NEOPLASM OF FEMALE BREAST, UNSPECIFIED ESTROGEN RECEPTOR STATUS, UNSPECIFIED LATERALITY, UNSPECIFIED SITE OF BREAST: ICD-10-CM

## 2022-10-06 DIAGNOSIS — M81.0 OSTEOPOROSIS, UNSPECIFIED OSTEOPOROSIS TYPE, UNSPECIFIED PATHOLOGICAL FRACTURE PRESENCE: ICD-10-CM

## 2022-10-06 DIAGNOSIS — D50.9 IRON DEFICIENCY ANEMIA, UNSPECIFIED IRON DEFICIENCY ANEMIA TYPE: ICD-10-CM

## 2022-10-06 DIAGNOSIS — Z12.31 SCREENING MAMMOGRAM FOR BREAST CANCER: Primary | ICD-10-CM

## 2022-10-06 LAB
ALBUMIN SERPL-MCNC: 4.9 G/DL (ref 3.5–5.2)
ALBUMIN/GLOB SERPL: 2 G/DL (ref 1.1–2.4)
ALP SERPL-CCNC: 72 U/L (ref 38–116)
ALT SERPL W P-5'-P-CCNC: 18 U/L (ref 0–33)
ANION GAP SERPL CALCULATED.3IONS-SCNC: 11.8 MMOL/L (ref 5–15)
AST SERPL-CCNC: 27 U/L (ref 0–32)
BASOPHILS # BLD AUTO: 0.04 10*3/MM3 (ref 0–0.2)
BASOPHILS NFR BLD AUTO: 0.4 % (ref 0–1.5)
BILIRUB SERPL-MCNC: 0.6 MG/DL (ref 0.2–1.2)
BUN SERPL-MCNC: 10 MG/DL (ref 6–20)
BUN/CREAT SERPL: 13.9 (ref 7.3–30)
CALCIUM SPEC-SCNC: 10 MG/DL (ref 8.5–10.2)
CHLORIDE SERPL-SCNC: 104 MMOL/L (ref 98–107)
CO2 SERPL-SCNC: 24.2 MMOL/L (ref 22–29)
CREAT SERPL-MCNC: 0.72 MG/DL (ref 0.6–1.1)
DEPRECATED RDW RBC AUTO: 45.8 FL (ref 37–54)
EGFRCR SERPLBLD CKD-EPI 2021: 92.3 ML/MIN/1.73
EOSINOPHIL # BLD AUTO: 0.06 10*3/MM3 (ref 0–0.4)
EOSINOPHIL NFR BLD AUTO: 0.6 % (ref 0.3–6.2)
ERYTHROCYTE [DISTWIDTH] IN BLOOD BY AUTOMATED COUNT: 13.3 % (ref 12.3–15.4)
GLOBULIN UR ELPH-MCNC: 2.4 GM/DL (ref 1.8–3.5)
GLUCOSE SERPL-MCNC: 135 MG/DL (ref 74–124)
HCT VFR BLD AUTO: 42.8 % (ref 34–46.6)
HGB BLD-MCNC: 14.3 G/DL (ref 12–15.9)
IMM GRANULOCYTES # BLD AUTO: 0.02 10*3/MM3 (ref 0–0.05)
IMM GRANULOCYTES NFR BLD AUTO: 0.2 % (ref 0–0.5)
LYMPHOCYTES # BLD AUTO: 2.62 10*3/MM3 (ref 0.7–3.1)
LYMPHOCYTES NFR BLD AUTO: 28.1 % (ref 19.6–45.3)
MCH RBC QN AUTO: 31.4 PG (ref 26.6–33)
MCHC RBC AUTO-ENTMCNC: 33.4 G/DL (ref 31.5–35.7)
MCV RBC AUTO: 93.9 FL (ref 79–97)
MONOCYTES # BLD AUTO: 0.61 10*3/MM3 (ref 0.1–0.9)
MONOCYTES NFR BLD AUTO: 6.5 % (ref 5–12)
NEUTROPHILS NFR BLD AUTO: 5.98 10*3/MM3 (ref 1.7–7)
NEUTROPHILS NFR BLD AUTO: 64.2 % (ref 42.7–76)
NRBC BLD AUTO-RTO: 0 /100 WBC (ref 0–0.2)
PLATELET # BLD AUTO: 162 10*3/MM3 (ref 140–450)
PMV BLD AUTO: 10.8 FL (ref 6–12)
POTASSIUM SERPL-SCNC: 4.9 MMOL/L (ref 3.5–4.7)
PROT SERPL-MCNC: 7.3 G/DL (ref 6.3–8)
RBC # BLD AUTO: 4.56 10*6/MM3 (ref 3.77–5.28)
SODIUM SERPL-SCNC: 140 MMOL/L (ref 134–145)
WBC NRBC COR # BLD: 9.33 10*3/MM3 (ref 3.4–10.8)

## 2022-10-06 PROCEDURE — 99214 OFFICE O/P EST MOD 30 MIN: CPT | Performed by: INTERNAL MEDICINE

## 2022-10-06 PROCEDURE — 80053 COMPREHEN METABOLIC PANEL: CPT

## 2022-10-06 PROCEDURE — 36415 COLL VENOUS BLD VENIPUNCTURE: CPT

## 2022-10-06 PROCEDURE — 85025 COMPLETE CBC W/AUTO DIFF WBC: CPT

## 2022-10-06 NOTE — PROGRESS NOTES
Subjective     REASON FOR CONSULTATION:  . T1a/T1BN0 invasive ductal carcinoma of the right breast, 12 o'clock position, grade 2 with a [place] score of 6 with tumor size of 5 x 5 x 5 mm, on biopsy was 6.5 mm, margins were uninvolved by invasive carcinoma.  7 lymph nodes were negative.  ER 99%, HI 25%, HER2/sanchez 1+ negative, Ki-67 3%.  Margins are clear.      HISTORY OF PRESENT ILLNESS:    Patient is a 67-year-old female recently diagnosed with a T1 a/T1b N0 invasive ductal carcinoma of the right breast grade 2, ER/HI positive HER2/sanchez negative.     Her bone density showed evidence of osteoporosis.  We had discussed aromatase inhibitor with Prolia.  However she is not keen on taking Prolia and now is agreeable for tamoxifen.  She does take a baby aspirin.  She has never had blood clots and never had depression.  We did discuss in length side effects of tamoxifen today and she is agreeable to start tamoxifen as she does not want to take anything that can affect her bones.    Interval history: Tolerating tamoxifen.  She does have osteoporosis but had refused Prolia in the past.  She continues her calcium and vitamin D supplements.  She is asymptomatic.    Oncologic history:  Patient is a 67-year-old female who was found to have an abnormality seen on the screening mammogram.  She had skipped mammograms for many years.  Her most recent mammogram was at least 10 years ago.  She denied having any masses that was palpable.    Details are as follows.    January 17, 2022:  FINDINGS:  The breasts are heterogeneously dense, which may obscure small masses.     There is an area of architectural distortion in the upper inner  posterior right breast, which is best appreciated on Tomosynthesis and  on the MLO view. There are no suspicious masses, calcifications, or  areas of architectural distortion in the left breast.    IMPRESSION/RECOMMENDATION(S):  Right breast architectural distortion. Recommend further evaluation  with  CC and MLO spot compression and lateral views with Tomosynthesis and  targeted ultrasound.    February 15, 2022:  Right breast diagnostic mammogram and ultrasound  There is a persistent area of architectural distortion in the upper  central posterior right breast, best appreciated with Tomosynthesis.  This area was further assessed with ultrasound.     ULTRASOUND:  Targeted sonographic evaluation of the right breast was performed from  12:00 to 1:00 in the region of the mammographic abnormality. At 11:30, 4  cm from the nipple, there is an incidental 0.6 x 0.3 x 0.7 cm  benign-appearing cluster of cysts. At 12:00, 4 cm from the nipple, there  is a vague irregular hypoechoic mass/area of shadowing measuring 0.7 x  0.6 x 0.6 cm in the region of the mammographic distortion.        IMPRESSION:  Suspicious 0.7 cm mass/area of shadowing at 12:00 in the right breast.  Recommend further evaluation with ultrasound-guided core needle biopsy  and clip correlation with post biopsy mammogram. If the biopsy clip does  not correspond to the area of mammographic architectural distortion in  the upper central posterior right breast, further evaluation with  stereotactic/Tomosynthesis guided core needle biopsy would then be  recommended.    March 9, 2022: Ultrasound-guided right breast biopsy at 12:00 showed    Breast, Right, 12:00 o'clock, 4 cm from Nipple, Biopsy:  A. Invasive ductal adenocarcinoma, [place] grade II (tubular grade=3, nuclear grade=2, mitotic grade=1).  B. Microcalcifications are associated with the invasive tumor and non-neoplastic tissue.  C. Ductal carcinoma in-situ is not identified.  D. The tumor measures up to 6.5 mm.    ER: 99%  PA: 25%  HER2/sanchez: 1+ negative  Ki-67 3%    March 17, 2022: MRI of the bilateral breast    IMPRESSION:  1. Biopsy-proven malignancy in the right breast in the posterior  one-third at the 12-o'clock position measuring on the order of 1.1 cm in  greatest dimension with the  bowtie-shaped metallic clip positioned on  the order of 0.7 cm superior and slightly medial to the epicenter of the  suspected residual malignancy within a hematoma cavity that measures on  the order of 2 cm in greatest dimension. No other suspicious findings  are seen within the right breast and there is no evidence for right  axillary adenopathy.  2. There are no findings suspicious for malignancy in the left breast.     April 28, 2022: Right breast lumpectomy with sentinel lymph node surgery    Synoptic Checklist  INVASIVE CARCINOMA OF THE BREAST: Resection (INVASIVE CARCINOMA OF THE BREAST: COMPLETE EXCISION - All Specimens)  Procedure Excision (less than total mastectomy)  Specimen Laterality Right  .  TUMOR  Tumor Site Clock position  12 o'clock  Histologic Type Invasive carcinoma of no special type (ductal)  Histologic Grade ([place] Histologic Score)  Glandular (Acinar) / Tubular Differentiation Score 3  Nuclear Pleomorphism  .  1. Breast, Right.  Mitotic Rate Score 1  Overall Grade Grade 2 (scores of 6 or 7)  Tumor Size Greatest dimension of largest invasive focus (Millimeters): 5 mm  Additional Dimension (Millimeters) 5 mm  5 mm  Tumor Focality Single focus of invasive carcinoma  Ductal Carcinoma In Situ (DCIS) Not identified  Lobular Carcinoma In Situ (LCIS) Not identified  Lymphovascular Invasion Not identified  Dermal Lymphovascular Invasion No skin present  Microcalcifications Present in invasive carcinoma  Present in non-neoplastic tissue  Treatment Effect in the Breast No known presurgical therapy  .  MARGINS  Margin Status for Invasive Carcinoma All margins negative for invasive carcinoma  Distance from Invasive Carcinoma to Closest Margin 8.4 mm  Closest Margin(s) to Invasive Carcinoma Inferior  Distance from Invasive Carcinoma to Anterior Margin 11 mm  Distance from Invasive Carcinoma to Posterior Margin 18 mm  Distance from Invasive Carcinoma to Superior Margin 17 mm  Distance from Invasive  Carcinoma to Inferior Margin 8.4 mm  Distance from Invasive Carcinoma to Medial Margin 11 mm  Distance from Invasive Carcinoma to Lateral Margin 22 mm  .  REGIONAL LYMPH NODES  Regional Lymph Node Status All regional lymph nodes negative for tumor  Total Number of Lymph Nodes Examined (sentinel and non-sentinel) 7  Number of [place] Nodes Examined 7  .      Past Medical History:   Diagnosis Date   • Back pain     2005 had ruptured disc no surgery so prn c/o pain   • Breast cancer (HCC) 03/09/2022    Right breast invasive ductal adenocarcinoma, ER/IN positive, HER2 negative   • Carotid artery stenosis    • Cervical cancer (HCC)    • Colon polyp 08/20/2021    Rectum: hyperplastic polyp   • Diverticulosis    • Fibromuscular dysplasia (HCC)     dx 2 yrs ago   • H/O bronchitis    • Hyperlipidemia    • Loose stools    • Osteoporosis    • Positive colorectal cancer screening using Cologuard test    • Psoriasis         Past Surgical History:   Procedure Laterality Date   • BREAST BIOPSY Right 03/09/2022    US guided mammotome vaccum assisted right breast biopsy-Dr. Julián Palacios, [facility]   • BREAST LUMPECTOMY WITH SENTINEL NODE BIOPSY Right 04/28/2022    Procedure: RIGHT BREAST WIRE LOCALIZED LUMPECTOMY WITH SENTINEL NODE BIOPSY;  Surgeon: Allie Bah MD;  Location:  [facility];  Service: General;  Laterality: Right;   • COLONOSCOPY N/A 08/20/2021    Procedure: COLONOSCOPY INTO CECUM AND TI WITH COLD BX POLYPECTOMIES;  Surgeon: Catia Kate MD;  Location: [facility] ENDOSCOPY;  Service: Gastroenterology;  Laterality: N/A;  PRE: POSITIVE COLOGUARD  POST: DIVERTICULOSIS, POLYPS, HEMORRHOIDS   • IN THROMBOENDARTECTMY NECK,NECK INCIS Left 09/14/2016    Procedure: LT CAROTID ENDARTERECTOMY WITH INTRA OPERATIVE CAROTID DUPLEX SCAN;  Surgeon: Arnulfo Hernandez MD;  Location: [facility] MAIN OR;  Service: Vascular   • SUBTOTAL HYSTERECTOMY Bilateral 09/1987    done at [facility], ovaries still in tact        Current Outpatient  Medications on File Prior to Visit   Medication Sig Dispense Refill   • aspirin 81 MG EC tablet Take 81 mg by mouth Daily.     • atorvastatin (LIPITOR) 20 MG tablet Take 1 tablet by mouth Every Night. 90 tablet 3   • Calcium Carbonate (CALCIUM 500 PO) Take 1 tablet by mouth Daily.     • irbesartan (Avapro) 150 MG tablet Take 1 tablet by mouth Daily. 30 tablet 1   • tamoxifen (NOLVADEX) 20 MG chemo tablet Take 1 tablet by mouth Daily.     • [DISCONTINUED] Cholecalciferol (Vitamin D) 50 MCG ( UT) capsule Take 2,000 Units by mouth Daily.       No current facility-administered medications on file prior to visit.        ALLERGIES:  No Known Allergies     Social History     Socioeconomic History   • Marital status: Single   Tobacco Use   • Smoking status: Every Day     Packs/day: 1.00     Years: 40.00     Pack years: 40.00     Types: Cigarettes     Start date: 1982   • Smokeless tobacco: Never   • Tobacco comments:      PPD   Vaping Use   • Vaping Use: Never used   Substance and Sexual Activity   • Alcohol use: Yes     Alcohol/week: 6.0 standard drinks     Types: 2 Glasses of wine, 4 Cans of beer per week   • Drug use: Never   • Sexual activity: Not Currently        Family History   Problem Relation Age of Onset   • Vision loss Mother    • Skin cancer Father    • Skin cancer Sister    • Depression Daughter    • Malig Hyperthermia Neg Hx    • Breast cancer Neg Hx    • Ovarian cancer Neg Hx       OB/GYN history:  Age of menarche: 15  Age of menopause: 45  Age of first childbirth 28   2 para 2 no miscarriages  Total number of years of contraceptive use: 2 years  No hormone replacement therapy given    Family history: Negative for breast or ovarian cancer.  Patient had cervical cancer when she was very young and had to undergo hysterectomy.    Review of Systems   Constitutional: Negative for appetite change, chills, diaphoresis, fatigue, fever and unexpected weight change.   HENT: Negative for hearing loss,  "sore throat and trouble swallowing.    Respiratory: Negative for cough, chest tightness, shortness of breath and wheezing.    Cardiovascular: Negative for chest pain, palpitations and leg swelling.   Gastrointestinal: Negative for abdominal distention, abdominal pain, constipation, diarrhea, nausea and vomiting.   Genitourinary: Negative for dysuria, frequency, hematuria and urgency.   Musculoskeletal: Negative for joint swelling.        No muscle weakness.   Skin: Negative for rash and wound.   Neurological: Negative for seizures, syncope, speech difficulty, weakness, numbness and headaches.   Hematological: Negative for adenopathy. Does not bruise/bleed easily.   Psychiatric/Behavioral: Negative for behavioral problems, confusion and suicidal ideas.   All other systems reviewed and are negative.     I have reviewed and confirmed the accuracy of the ROS as documented by the MA/LPN/RN Karo Desai MD      Objective     Vitals:    10/06/22 1425   BP: 146/78   Pulse: 76   Resp: 16   Temp: 97.5 °F (36.4 °C)   TempSrc: Temporal   SpO2: 98%   Weight: 55.1 kg (121 lb 8 oz)   Height: 165.1 cm (65\")   PainSc: 0-No pain     Current Status 10/6/2022   ECOG score 0       Physical Exam         This patient's ACP documentation is up to date, and there's nothing further left to document.      CONSTITUTIONAL:  Vital signs reviewed.  No distress, looks comfortable.  RESPIRATORY:  Normal respiratory effort.  Lungs clear to auscultation bilaterally.  BREAST: Right breast: No skin changes, no evidence of breast mass, no nipple discharge, no evidence of any right axillary adenopathy or right supraclavicular adenopathy  Left breast: No evidence of any skin changes, no evidence of any left breast mass and no evidence of left nipple discharge as well as no left axillary adenopathy or left supraclavicular adenopathy.  CARDIOVASCULAR:  Normal S1, S2.  No murmurs rubs or gallops.  No significant lower extremity edema.  GASTROINTESTINAL: " Abdomen appears unremarkable.  Nontender.  No hepatomegaly.  No splenomegaly.  LYMPHATIC:  No cervical, supraclavicular, axillary lymphadenopathy.  SKIN:  Warm.  No rashes.  PSYCHIATRIC:  Normal judgment and insight.  Normal mood and affect.    I have reexamined the patient and the results are consistent with the previously documented exam. Karo Desai MD       RECENT LABS:  Hematology WBC   Date Value Ref Range Status   10/06/2022 9.33 3.40 - 10.80 10*3/mm3 Final     RBC   Date Value Ref Range Status   10/06/2022 4.56 3.77 - 5.28 10*6/mm3 Final     Hemoglobin   Date Value Ref Range Status   10/06/2022 14.3 12.0 - 15.9 g/dL Final     Hematocrit   Date Value Ref Range Status   10/06/2022 42.8 34.0 - 46.6 % Final     Platelets   Date Value Ref Range Status   10/06/2022 162 140 - 450 10*3/mm3 Final          Assessment & Plan       1. T1a/T1BN0 invasive ductal carcinoma of the right breast, 12 o'clock position, grade 2 with a [place] score of 6 with tumor size of 5 x 5 x 5 mm, on biopsy was 6.5 mm, margins were uninvolved by invasive carcinoma.  7 lymph nodes were negative.  ER 99%, ND 25%, HER2/sanchez 1+ negative, Ki-67 3%.  Margins are clear.  · Patient's Oncotype DX  shows a score of 20  · Given the patient is highly ER/ND positive HER2 negative and a small tumor we can consider endocrine therapy alone with Oncotype DX score of 20.  · Her benefit from chemotherapy is very minimal and hence we discussed about endocrine therapy  · We had discussed aromatase inhibitor with Prolia versus tamoxifen.  · Bone density does show osteoporosis and she is leaning towards tamoxifen as she does not want do anything that can affect the bones.  · Discussed in length side effects of tamoxifen.   · Patient is to start radiation starting July 11. 2022  · Patient is to start tamoxifen towards the end of radiation  · October sixth 2022: Tolerating tamoxifen extremely well        2.  Osteoporosis, patient has not had bone density for  some time  Bone density done January 17, 2022 did show osteoporosis    · Will start calcium and vitamin D supplementation  · We discussed about consideration of starting Prolia but patient refuses Prolia.  She also has gum issues and she has to have many of the teeth pulled.  · Unable to take Prolia because of gum issues and currently does not want to    3.  Family history of cancer: No family history of breast or ovarian cancer.  Patient had history of cervical cancer when she was [age] years  · .  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  · Clinical significance of the variant is uncertain.  Familial variation of uncertain significance testing is not recommended.    Plan  · S/p radiation July 14, 2022-August 10, 2022  · Currently tolerating tamoxifen  · Given osteoporosis we had offered Prolia but patient refused she has too many issues currently  · Follow-up for mammogram in January 2023  · Follow-up with me in 3 months after the mammogram with labs      MD Dr. Glenys Stanford Dr.

## 2022-10-08 PROBLEM — Z12.31 SCREENING MAMMOGRAM FOR BREAST CANCER: Status: ACTIVE | Noted: 2022-08-15

## 2022-10-20 ENCOUNTER — DOCUMENTATION (OUTPATIENT)
Dept: ONCOLOGY | Facility: CLINIC | Age: 67
End: 2022-10-20

## 2022-10-20 ENCOUNTER — TELEPHONE (OUTPATIENT)
Dept: ONCOLOGY | Facility: CLINIC | Age: 67
End: 2022-10-20

## 2022-10-20 NOTE — TELEPHONE ENCOUNTER
Call to Ms. Zaragoza to let her know we had received her message, and Dr. Desai wanted to instruct her to stop taking the Tamoxifen, and she will be set up for appointment with NP in 6 weeks to reassess and possibly try another medication.  Patient verbalized understanding.

## 2022-10-20 NOTE — TELEPHONE ENCOUNTER
Returned call to patient who is reporting multiple symptoms.  She started on Tamoxifen approximately 6 weeks ago.  About 2 weeks ago she started experiencing hot flashes, depression, hair loss and weight gain.  She had one episode of her right leg feeling like it went to sleep which lasted about 15 minutes, then resolved.  She has not experienced it again.  She has also had some nausea and diarrhea.  She is concerned that she is feeling this way and experiencing these symptoms.  Please advise.

## 2022-10-20 NOTE — PROGRESS NOTES
Patient called today and had severe side effects with tamoxifen like depression, hot flashes mood swings and I think she is better off holding tamoxifen.  Apoorva will contact her to stop the tamoxifen.  She will be assessed by nurse practitioner in 6 weeks at which time we can start her on Arimidex.  She is currently already on Prolia and she will need to continue Prolia up as she is osteoporotic along with calcium and vitamin D supplements.  She has appointment with me in February 2023.  Karo Desai MD

## 2022-10-30 ENCOUNTER — HOSPITAL ENCOUNTER (OUTPATIENT)
Dept: CT IMAGING | Facility: HOSPITAL | Age: 67
Discharge: HOME | End: 2022-10-30
Admitting: STUDENT IN AN ORGANIZED HEALTH CARE EDUCATION/TRAINING PROGRAM

## 2022-10-30 DIAGNOSIS — I65.23 CAROTID STENOSIS, BILATERAL: ICD-10-CM

## 2022-10-30 PROCEDURE — 70498 CT ANGIOGRAPHY NECK: CPT

## 2022-10-30 PROCEDURE — 70496 CT ANGIOGRAPHY HEAD: CPT

## 2022-10-30 PROCEDURE — 0 IOPAMIDOL PER 1 ML: Performed by: STUDENT IN AN ORGANIZED HEALTH CARE EDUCATION/TRAINING PROGRAM

## 2022-10-30 RX ADMIN — IOPAMIDOL 95 ML: 755 INJECTION, SOLUTION INTRAVENOUS at 11:10

## 2022-11-16 DIAGNOSIS — I10 PRIMARY HYPERTENSION: ICD-10-CM

## 2022-11-17 RX ORDER — IRBESARTAN 150 MG/1
TABLET ORAL
Qty: 30 TABLET | Refills: 3 | Status: SHIPPED | OUTPATIENT
Start: 2022-11-17 | End: 2023-03-20

## 2022-11-29 ENCOUNTER — APPOINTMENT (OUTPATIENT)
Dept: LAB | Facility: HOSPITAL | Age: 67
End: 2022-11-29
Payer: MEDICARE

## 2022-12-06 ENCOUNTER — LAB (OUTPATIENT)
Dept: LAB | Facility: HOSPITAL | Age: 67
End: 2022-12-06
Payer: MEDICARE

## 2022-12-06 ENCOUNTER — OFFICE VISIT (OUTPATIENT)
Dept: ONCOLOGY | Facility: CLINIC | Age: 67
End: 2022-12-06

## 2022-12-06 VITALS
TEMPERATURE: 96.9 F | BODY MASS INDEX: 21.34 KG/M2 | HEART RATE: 89 BPM | DIASTOLIC BLOOD PRESSURE: 70 MMHG | RESPIRATION RATE: 18 BRPM | HEIGHT: 65 IN | WEIGHT: 128.1 LBS | OXYGEN SATURATION: 97 % | SYSTOLIC BLOOD PRESSURE: 135 MMHG

## 2022-12-06 DIAGNOSIS — Z12.31 SCREENING MAMMOGRAM FOR BREAST CANCER: ICD-10-CM

## 2022-12-06 DIAGNOSIS — T38.6X5A ADVERSE EFFECT OF TAMOXIFEN, INITIAL ENCOUNTER: ICD-10-CM

## 2022-12-06 DIAGNOSIS — Z17.0 MALIGNANT NEOPLASM OF RIGHT BREAST IN FEMALE, ESTROGEN RECEPTOR POSITIVE, UNSPECIFIED SITE OF BREAST: Primary | ICD-10-CM

## 2022-12-06 DIAGNOSIS — C50.911 MALIGNANT NEOPLASM OF RIGHT BREAST IN FEMALE, ESTROGEN RECEPTOR POSITIVE, UNSPECIFIED SITE OF BREAST: Primary | ICD-10-CM

## 2022-12-06 DIAGNOSIS — M81.0 OSTEOPOROSIS, UNSPECIFIED OSTEOPOROSIS TYPE, UNSPECIFIED PATHOLOGICAL FRACTURE PRESENCE: ICD-10-CM

## 2022-12-06 DIAGNOSIS — D50.9 IRON DEFICIENCY ANEMIA, UNSPECIFIED IRON DEFICIENCY ANEMIA TYPE: ICD-10-CM

## 2022-12-06 LAB
ALBUMIN SERPL-MCNC: 4.9 G/DL (ref 3.5–5.2)
ALBUMIN/GLOB SERPL: 1.9 G/DL (ref 1.1–2.4)
ALP SERPL-CCNC: 77 U/L (ref 38–116)
ALT SERPL W P-5'-P-CCNC: 22 U/L (ref 0–33)
ANION GAP SERPL CALCULATED.3IONS-SCNC: 14.2 MMOL/L (ref 5–15)
AST SERPL-CCNC: 32 U/L (ref 0–32)
BASOPHILS # BLD AUTO: 0.05 10*3/MM3 (ref 0–0.2)
BASOPHILS NFR BLD AUTO: 0.6 % (ref 0–1.5)
BILIRUB SERPL-MCNC: 0.5 MG/DL (ref 0.2–1.2)
BUN SERPL-MCNC: 8 MG/DL (ref 6–20)
BUN/CREAT SERPL: 11.3 (ref 7.3–30)
CALCIUM SPEC-SCNC: 10.3 MG/DL (ref 8.5–10.2)
CHLORIDE SERPL-SCNC: 102 MMOL/L (ref 98–107)
CO2 SERPL-SCNC: 24.8 MMOL/L (ref 22–29)
CREAT SERPL-MCNC: 0.71 MG/DL (ref 0.6–1.1)
DEPRECATED RDW RBC AUTO: 49.2 FL (ref 37–54)
EGFRCR SERPLBLD CKD-EPI 2021: 93.9 ML/MIN/1.73
EOSINOPHIL # BLD AUTO: 0.05 10*3/MM3 (ref 0–0.4)
EOSINOPHIL NFR BLD AUTO: 0.6 % (ref 0.3–6.2)
ERYTHROCYTE [DISTWIDTH] IN BLOOD BY AUTOMATED COUNT: 14 % (ref 12.3–15.4)
GLOBULIN UR ELPH-MCNC: 2.6 GM/DL (ref 1.8–3.5)
GLUCOSE SERPL-MCNC: 130 MG/DL (ref 74–124)
HCT VFR BLD AUTO: 40.8 % (ref 34–46.6)
HGB BLD-MCNC: 13.8 G/DL (ref 12–15.9)
IMM GRANULOCYTES # BLD AUTO: 0.03 10*3/MM3 (ref 0–0.05)
IMM GRANULOCYTES NFR BLD AUTO: 0.4 % (ref 0–0.5)
LYMPHOCYTES # BLD AUTO: 1.88 10*3/MM3 (ref 0.7–3.1)
LYMPHOCYTES NFR BLD AUTO: 23 % (ref 19.6–45.3)
MCH RBC QN AUTO: 32.4 PG (ref 26.6–33)
MCHC RBC AUTO-ENTMCNC: 33.8 G/DL (ref 31.5–35.7)
MCV RBC AUTO: 95.8 FL (ref 79–97)
MONOCYTES # BLD AUTO: 0.7 10*3/MM3 (ref 0.1–0.9)
MONOCYTES NFR BLD AUTO: 8.6 % (ref 5–12)
NEUTROPHILS NFR BLD AUTO: 5.46 10*3/MM3 (ref 1.7–7)
NEUTROPHILS NFR BLD AUTO: 66.8 % (ref 42.7–76)
NRBC BLD AUTO-RTO: 0 /100 WBC (ref 0–0.2)
PLATELET # BLD AUTO: 181 10*3/MM3 (ref 140–450)
PMV BLD AUTO: 10.8 FL (ref 6–12)
POTASSIUM SERPL-SCNC: 5.3 MMOL/L (ref 3.5–4.7)
PROT SERPL-MCNC: 7.5 G/DL (ref 6.3–8)
RBC # BLD AUTO: 4.26 10*6/MM3 (ref 3.77–5.28)
SODIUM SERPL-SCNC: 141 MMOL/L (ref 134–145)
WBC NRBC COR # BLD: 8.17 10*3/MM3 (ref 3.4–10.8)

## 2022-12-06 PROCEDURE — 99213 OFFICE O/P EST LOW 20 MIN: CPT | Performed by: NURSE PRACTITIONER

## 2022-12-06 PROCEDURE — 85025 COMPLETE CBC W/AUTO DIFF WBC: CPT

## 2022-12-06 PROCEDURE — 36415 COLL VENOUS BLD VENIPUNCTURE: CPT

## 2022-12-06 PROCEDURE — 80053 COMPREHEN METABOLIC PANEL: CPT

## 2022-12-06 NOTE — PROGRESS NOTES
Subjective     REASON FOR FOLLOW-UP:  . T1a/T1BN0 invasive ductal carcinoma of the right breast, 12 o'clock position, grade 2 with a Latonya score of 6 with tumor size of 5 x 5 x 5 mm, on biopsy was 6.5 mm, margins were uninvolved by invasive carcinoma.  7 lymph nodes were negative.  ER 99%, W[place] 25%, HER2/sanchez 1+ negative, Ki-67 3%.  Margins are clear.    HISTORY OF PRESENT ILLNESS:    Patient is a 67-year-old female recently diagnosed with a T1 a/T1b N0 invasive ductal carcinoma of the right breast grade 2, ER/WY positive HER2/sanchez negative.     Her bone density showed evidence of osteoporosis.  We had discussed aromatase inhibitor with Prolia.  However she is not keen on taking Prolia and now is agreeable for tamoxifen.  She does take a baby aspirin.  She has never had blood clots and never had depression.  We did discuss in length side effects of tamoxifen today and she is agreeable to start tamoxifen as she does not want to take anything that can affect her bones.    Interval history:  Patient is seen back today for early follow-up after calling last month reporting significant side effects to tamoxifen.  She had been taking the tablet roughly 2 months when she developed worsening depression, hair loss and weight gain.  She was instructed to hold the tamoxifen and we reviewed back today.    As she is reviewed back she notes her mood has improved greatly.  Hair loss has slowed.  She continues to hold onto the weight she gained which is frustrating for her but she is not gaining more thankfully.    Patient has significant osteoporosis and we have previously discussed the use of Prolia however patient needs extensive dental work done which she has not yet completed and therefore Prolia has been delayed.  We discussed it would not be prudent to start her on an AI knowing the degree of her osteoporosis.  I would need to discuss other potential options with Dr. Desai.    ONCOLOGIC HISTORY:  Patient is a  67-year-old female who was found to have an abnormality seen on the screening mammogram.  She had skipped mammograms for many years.  Her most recent mammogram was at least 10 years ago.  She denied having any masses that was palpable.    Details are as follows.    January 17, 2022:  FINDINGS:  The breasts are heterogeneously dense, which may obscure small masses.     There is an area of architectural distortion in the upper inner  posterior right breast, which is best appreciated on Tomosynthesis and  on the MLO view. There are no suspicious masses, calcifications, or  areas of architectural distortion in the left breast.    IMPRESSION/RECOMMENDATION(S):  Right breast architectural distortion. Recommend further evaluation with  CC and MLO spot compression and lateral views with Tomosynthesis and  targeted ultrasound.    February 15, 2022:  Right breast diagnostic mammogram and ultrasound  There is a persistent area of architectural distortion in the upper  central posterior right breast, best appreciated with Tomosynthesis.  This area was further assessed with ultrasound.     ULTRASOUND:  Targeted sonographic evaluation of the right breast was performed from  12:00 to 1:00 in the region of the mammographic abnormality. At 11:30, 4  cm from the nipple, there is an incidental 0.6 x 0.3 x 0.7 cm  benign-appearing cluster of cysts. At 12:00, 4 cm from the nipple, there  is a vague irregular hypoechoic mass/area of shadowing measuring 0.7 x  0.6 x 0.6 cm in the region of the mammographic distortion.        IMPRESSION:  Suspicious 0.7 cm mass/area of shadowing at 12:00 in the right breast.  Recommend further evaluation with ultrasound-guided core needle biopsy  and clip correlation with post biopsy mammogram. If the biopsy clip does  not correspond to the area of mammographic architectural distortion in  the upper central posterior right breast, further evaluation with  stereotactic/Tomosynthesis guided core needle  biopsy would then be  recommended.    March 9, 2022: Ultrasound-guided right breast biopsy at 12:00 showed    Breast, Right, 12:00 o'clock, 4 cm from Nipple, Biopsy:  A. Invasive ductal adenocarcinoma, Latonya grade II (tubular grade=3, nuclear grade=2, mitotic grade=1).  B. Microcalcifications are associated with the invasive tumor and non-neoplastic tissue.  C. Ductal carcinoma in-situ is not identified.  D. The tumor measures up to 6.5 mm.    ER: 99%  [place]: 25%  HER2/sanchez: 1+ negative  Ki-67 3%    March 17, 2022: MRI of the bilateral breast    IMPRESSION:  1. Biopsy-proven malignancy in the right breast in the posterior  one-third at the 12-o'clock position measuring on the order of 1.1 cm in  greatest dimension with the bowtie-shaped metallic clip positioned on  the order of 0.7 cm superior and slightly medial to the epicenter of the  suspected residual malignancy within a hematoma cavity that measures on  the order of 2 cm in greatest dimension. No other suspicious findings  are seen within the right breast and there is no evidence for right  axillary adenopathy.  2. There are no findings suspicious for malignancy in the left breast.     April 28, 2022: Right breast lumpectomy with sentinel lymph node surgery    Synoptic Checklist  INVASIVE CARCINOMA OF THE BREAST: Resection (INVASIVE CARCINOMA OF THE BREAST: COMPLETE EXCISION - All Specimens)  Procedure Excision (less than total mastectomy)  Specimen Laterality Right  .  TUMOR  Tumor Site Clock position  12 o'clock  Histologic Type Invasive carcinoma of no special type (ductal)  Histologic Grade (Latonya Histologic Score)  Glandular (Acinar) / Tubular Differentiation Score 3  Nuclear Pleomorphism  .  1. Breast, Right.  Mitotic Rate Score 1  Overall Grade Grade 2 (scores of 6 or 7)  Tumor Size Greatest dimension of largest invasive focus (Millimeters): 5 mm  Additional Dimension (Millimeters) 5 mm  5 mm  Tumor Focality Single focus of invasive  carcinoma  Ductal Carcinoma In Situ (DCIS) Not identified  Lobular Carcinoma In Situ (LCIS) Not identified  Lymphovascular Invasion Not identified  Dermal Lymphovascular Invasion No skin present  Microcalcifications Present in invasive carcinoma  Present in non-neoplastic tissue  Treatment Effect in the Breast No known presurgical therapy  .  MARGINS  Margin Status for Invasive Carcinoma All margins negative for invasive carcinoma  Distance from Invasive Carcinoma to Closest Margin 8.4 mm  Closest Margin(s) to Invasive Carcinoma Inferior  Distance from Invasive Carcinoma to Anterior Margin 11 mm  Distance from Invasive Carcinoma to Posterior Margin 18 mm  Distance from Invasive Carcinoma to Superior Margin 17 mm  Distance from Invasive Carcinoma to Inferior Margin 8.4 mm  Distance from Invasive Carcinoma to Medial Margin 11 mm  Distance from Invasive Carcinoma to Lateral Margin 22 mm  .  REGIONAL LYMPH NODES  Regional Lymph Node Status All regional lymph nodes negative for tumor  Total Number of Lymph Nodes Examined (sentinel and non-sentinel) 7  Number of [place] Nodes Examined 7  .      Past Medical History:   Diagnosis Date   • Back pain     2005 had ruptured disc no surgery so prn c/o pain   • Breast cancer (HCC) 03/09/2022    Right breast invasive ductal adenocarcinoma, ER/ME positive, HER2 negative   • Carotid artery stenosis    • Cervical cancer (HCC)    • Colon polyp 08/20/2021    Rectum: hyperplastic polyp   • Diverticulosis    • Fibromuscular dysplasia (HCC)     dx 2 yrs ago   • H/O bronchitis    • Hyperlipidemia    • Loose stools    • Osteoporosis    • Positive colorectal cancer screening using Cologuard test    • Psoriasis         Past Surgical History:   Procedure Laterality Date   • BREAST BIOPSY Right 03/09/2022    US guided mammotome vaccum assisted right breast biopsy-Dr. Julián Palacios, [facility]   • BREAST LUMPECTOMY WITH SENTINEL NODE BIOPSY Right 04/28/2022    Procedure: RIGHT BREAST WIRE LOCALIZED  LUMPECTOMY WITH SENTINEL NODE BIOPSY;  Surgeon: Allie Bah MD;  Location:  [facility];  Service: General;  Laterality: Right;   • COLONOSCOPY N/A 08/20/2021    Procedure: COLONOSCOPY INTO CECUM AND TI WITH COLD BX POLYPECTOMIES;  Surgeon: Catia Kate MD;  Location: [facility] ENDOSCOPY;  Service: Gastroenterology;  Laterality: N/A;  PRE: POSITIVE COLOGUARD  POST: DIVERTICULOSIS, POLYPS, HEMORRHOIDS   • CA THROMBOENDARTECTMY NECK,NECK INCIS Left 09/14/2016    Procedure: LT CAROTID ENDARTERECTOMY WITH INTRA OPERATIVE CAROTID DUPLEX SCAN;  Surgeon: Arnulfo Hernandez MD;  Location: [facility] MAIN OR;  Service: Vascular   • SUBTOTAL HYSTERECTOMY Bilateral 09/1987    done at [facility], ovaries still in [facility]        Current Outpatient Medications on File Prior to Visit   Medication Sig Dispense Refill   • aspirin 81 MG EC tablet Take 81 mg by mouth Daily.     • atorvastatin (LIPITOR) 20 MG tablet Take 1 tablet by mouth Every Night. 90 tablet 3   • Calcium Carbonate (CALCIUM 500 PO) Take 1 tablet by mouth Daily.     • irbesartan (AVAPRO) 150 MG tablet TAKE ONE TABLET BY MOUTH DAILY 30 tablet 3   • [DISCONTINUED] tamoxifen (NOLVADEX) 20 MG chemo tablet Take 1 tablet by mouth Daily.       No current facility-administered medications on file prior to visit.        ALLERGIES:  No Known Allergies     Social History     Socioeconomic History   • Marital status: Single   Tobacco Use   • Smoking status: Every Day     Packs/day: 1.00     Years: 40.00     Pack years: 40.00     Types: Cigarettes     Start date: 1/1/1982   • Smokeless tobacco: Never   • Tobacco comments:     1/2 PPD   Vaping Use   • Vaping Use: Never used   Substance and Sexual Activity   • Alcohol use: Yes     Alcohol/week: 6.0 standard drinks     Types: 2 Glasses of wine, 4 Cans of beer per week   • Drug use: Never   • Sexual activity: Not Currently        Family History   Problem Relation Age of Onset   • Vision loss Mother    • Skin cancer Father    • Skin  "cancer Sister    • Depression Daughter    • Malig Hyperthermia Neg Hx    • Breast cancer Neg Hx    • Ovarian cancer Neg Hx       OB/GYN history:  Age of menarche: 15  Age of menopause: 45  Age of first childbirth 28   2 para 2 no miscarriages  Total number of years of contraceptive use: 2 years  No hormone replacement therapy given    Family history: Negative for breast or ovarian cancer.  Patient had cervical cancer when she was very young and had to undergo hysterectomy.    Review of Systems   Constitutional: Negative for appetite change, chills, diaphoresis, fatigue, fever and unexpected weight change.   HENT: Negative for hearing loss, sore throat and trouble swallowing.    Respiratory: Negative for cough, chest tightness, shortness of breath and wheezing.    Cardiovascular: Negative for chest pain, palpitations and leg swelling.   Gastrointestinal: Negative for abdominal distention, abdominal pain, constipation, diarrhea, nausea and vomiting.   Genitourinary: Negative for dysuria, frequency, hematuria and urgency.   Musculoskeletal: Negative for joint swelling.        No muscle weakness.   Skin: Negative for rash and wound.   Neurological: Negative for seizures, syncope, speech difficulty, weakness, numbness and headaches.   Hematological: Negative for adenopathy. Does not bruise/bleed easily.   Psychiatric/Behavioral: Negative for behavioral problems, confusion and suicidal ideas.   All other systems reviewed and are negative.    ROS reviewed and updated 22      Objective     Vitals:    22 1006   BP: 135/70   Pulse: 89   Resp: 18   Temp: 96.9 °F (36.1 °C)   TempSrc: Temporal   SpO2: 97%   Weight: 58.1 kg (128 lb 1.6 oz)   Height: 165.1 cm (65\")   PainSc: 0-No pain     Current Status 2022   ECOG score 0       Physical Exam         This patient's ACP documentation is up to date, and there's nothing further left to document.      CONSTITUTIONAL:  Vital signs reviewed.  No distress, looks " comfortable.  RESPIRATORY:  Normal respiratory effort.  Lungs clear to auscultation bilaterally.  BREAST: Right breast: No skin changes, no evidence of breast mass, no nipple discharge, no evidence of any right axillary adenopathy or right supraclavicular adenopathy  Left breast: No evidence of any skin changes, no evidence of any left breast mass and no evidence of left nipple discharge as well as no left axillary adenopathy or left supraclavicular adenopathy.  CARDIOVASCULAR:  Normal S1, S2.  No murmurs rubs or gallops.  No significant lower extremity edema.  GASTROINTESTINAL: Abdomen appears unremarkable.  Nontender.  No hepatomegaly.  No splenomegaly.  LYMPHATIC:  No cervical, supraclavicular, axillary lymphadenopathy.  SKIN:  Warm.  No rashes.  PSYCHIATRIC:  Normal judgment and insight.  Normal mood and affect.    I have reexamined the patient and the results are consistent with the previously documented exam. Herminia Carroll, IGNACIO       RECENT LABS:  Results from last 7 days   Lab Units 12/06/22  0954   WBC 10*3/mm3 8.17   NEUTROS ABS 10*3/mm3 5.46   HEMOGLOBIN g/dL 13.8   HEMATOCRIT % 40.8   PLATELETS 10*3/mm3 181                 Assessment & Plan       1. T1a/T1BN0 invasive ductal carcinoma of the right breast, 12 o'clock position, grade 2 with a Latonya score of 6 with tumor size of 5 x 5 x 5 mm, on biopsy was 6.5 mm, margins were uninvolved by invasive carcinoma.  7 lymph nodes were negative.  ER 99%, SD 25%, HER2/sanchez 1+ negative, Ki-67 3%.  Margins are clear.  · Patient's Oncotype DX  shows a score of 20  · Given the patient is highly ER/SD positive HER2 negative and a small tumor we can consider endocrine therapy alone with Oncotype DX score of 20.  · Her benefit from chemotherapy is very minimal and hence we discussed about endocrine therapy  · We had discussed aromatase inhibitor with Prolia versus tamoxifen.  · Bone density does show osteoporosis and she is leaning towards tamoxifen as she  does not want do anything that can affect the bones.  · Discussed in length side effects of tamoxifen.   · Patient is to start radiation starting July 11. 2022  · Patient is to start tamoxifen towards the end of radiation  · 10/6/2022: Tolerating tamoxifen extremely well  · 10/20/2022: Patient calling reporting significant depression (reports no history of depression prior), hair loss and weight gain.  Patient started to hold tamoxifen.  · 12/6/2022: Patient reviewed back today with stabilization of weight and improved mood.  Unfortunately patient has significant underlying osteoporosis and we have not been able to give her Prolia due to ongoing dental issues needing corrected.  I will need to discuss alternative treatment options of any with Dr. Desai.    2.  Osteoporosis, patient has not had bone density for some time  Bone density done January 17, 2022 did show osteoporosis    · Will start calcium and vitamin D supplementation  · We discussed about consideration of starting Prolia but patient refuses Prolia.  She also has gum issues and she has to have many of the teeth pulled.  · Unable to take Prolia because of gum/teeth issues.  · 12/6/2022 discussed again today the need to consider Prolia in order to give her AI therapy as outlined above.  She has not yet completed her dental work and was strongly urged to proceed with this.    3.  Family history of cancer: No family history of breast or ovarian cancer.  Patient had history of cervical cancer when she was [age] years  · .  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  · Clinical significance of the variant is uncertain.  Familial variation of uncertain significance testing is not recommended.    Plan  · Patient will not take further tamoxifen due to poor tolerance.  · Patient strongly urged to complete her dental work so that we can possibly give her Prolia along with AI therapy.  · In the meantime I will discuss with Dr. Desai  any alternative treatment options.  · Patient is scheduled for annual mammogram in January 2023.  · Patient will otherwise follow-up with Dr. Desai in February 2023.      IGNACIO Mayorga Dr., Dr.

## 2022-12-08 RX ORDER — RALOXIFENE HYDROCHLORIDE 60 MG/1
60 TABLET, FILM COATED ORAL DAILY
Qty: 30 TABLET | Refills: 5 | Status: SHIPPED | OUTPATIENT
Start: 2022-12-08

## 2022-12-08 NOTE — TELEPHONE ENCOUNTER
See office note from 12/6/2022. Per discussion with Dr. Desai we will try the patient on Evista since she was intolerant to tamoxifen and is not a good candidate at least right now for an AI.  Call the patient and explained new plan noting potential similar side effects of tamoxifen though hopefully she will tolerate better.  We also discussed the potential benefit to her bones on Evista.  Patient willing to try.  She was encouraged to call us back if she develops recurrent or intolerable symptoms.  She verbalized understanding.  She will otherwise follow-up in February with Dr. Desai as already scheduled.

## 2022-12-29 ENCOUNTER — OFFICE VISIT (OUTPATIENT)
Dept: INTERNAL MEDICINE | Facility: CLINIC | Age: 67
End: 2022-12-29

## 2022-12-29 VITALS
HEIGHT: 65 IN | BODY MASS INDEX: 21.66 KG/M2 | WEIGHT: 130 LBS | HEART RATE: 74 BPM | TEMPERATURE: 96 F | DIASTOLIC BLOOD PRESSURE: 76 MMHG | SYSTOLIC BLOOD PRESSURE: 124 MMHG | OXYGEN SATURATION: 98 %

## 2022-12-29 DIAGNOSIS — C50.911 MALIGNANT NEOPLASM OF RIGHT BREAST IN FEMALE, ESTROGEN RECEPTOR POSITIVE, UNSPECIFIED SITE OF BREAST: ICD-10-CM

## 2022-12-29 DIAGNOSIS — R06.09 EXERTIONAL DYSPNEA: Primary | ICD-10-CM

## 2022-12-29 DIAGNOSIS — M81.0 OSTEOPOROSIS, UNSPECIFIED OSTEOPOROSIS TYPE, UNSPECIFIED PATHOLOGICAL FRACTURE PRESENCE: Chronic | ICD-10-CM

## 2022-12-29 DIAGNOSIS — Z72.0 TOBACCO ABUSE: ICD-10-CM

## 2022-12-29 DIAGNOSIS — I10 PRIMARY HYPERTENSION: Chronic | ICD-10-CM

## 2022-12-29 DIAGNOSIS — I25.10 CORONARY ARTERY DISEASE INVOLVING NATIVE CORONARY ARTERY OF NATIVE HEART WITHOUT ANGINA PECTORIS: Chronic | ICD-10-CM

## 2022-12-29 DIAGNOSIS — I65.22 LEFT CAROTID STENOSIS: Chronic | ICD-10-CM

## 2022-12-29 DIAGNOSIS — E78.2 MIXED HYPERLIPIDEMIA: Chronic | ICD-10-CM

## 2022-12-29 DIAGNOSIS — Z17.0 MALIGNANT NEOPLASM OF RIGHT BREAST IN FEMALE, ESTROGEN RECEPTOR POSITIVE, UNSPECIFIED SITE OF BREAST: ICD-10-CM

## 2022-12-29 PROCEDURE — 99214 OFFICE O/P EST MOD 30 MIN: CPT | Performed by: NURSE PRACTITIONER

## 2022-12-29 NOTE — PROGRESS NOTES
"Chief Complaint  Follow-up, Hypertension, and Hyperlipidemia    Subjective        Arline Zaragoza presents to [facility]  History of Present Illness   This is a 66 y/o female presenting to office for chronic health management.     Patient is trying to exercise but has been having difficulty with some exercise intolerance.     HTN-- continues on irbesartan. Denies any CP. Reports some increased dyspnea exertional.     HLD-- continues on statin.     Has been following with vascular regarding carotid stenosis on left carotid; continues on aspirin and statin.     Patient reports she has been experiencing some dyspnea on exertion. Reports she has been having to take breaks from activity due to this. Patient reports she has been noticing when she exerts herself, her heart is beating really fasting and she is having to stop what she is doing due to her symptoms. Patient reports she has decreased her cigarette use-- now using 1/2 ppd. Patient reports this has worsened over the past year.      Objective   Vital Signs:  /76 (BP Location: Left arm, Patient Position: Sitting, Cuff Size: Adult)   Pulse 74   Temp 96 °F (35.6 °C) (Infrared)   Ht 165.1 cm (65\")   Wt 59 kg (130 lb)   SpO2 98%   BMI 21.63 kg/m²   Estimated body mass index is 21.63 kg/m² as calculated from the following:    Height as of this encounter: 165.1 cm (65\").    Weight as of this encounter: 59 kg (130 lb).    BMI is within normal parameters. No other follow-up for BMI required.      Physical Exam  Constitutional:       Appearance: Normal appearance. She is normal weight.   HENT:      Head: Normocephalic and atraumatic.      Right Ear: External ear normal.      Left Ear: External ear normal.   Eyes:      Conjunctiva/sclera: Conjunctivae normal.      Pupils: Pupils are equal, round, and reactive to light.   Cardiovascular:      Rate and Rhythm: Normal rate and regular rhythm.      Pulses: Normal pulses.      Heart " sounds: Normal heart sounds. No murmur heard.    No friction rub. No gallop.   Pulmonary:      Effort: Pulmonary effort is normal. No respiratory distress.      Breath sounds: Normal breath sounds. No stridor. No wheezing, rhonchi or rales.   Musculoskeletal:      Cervical back: Normal range of motion and neck supple.   Skin:     General: Skin is warm and dry.   Neurological:      General: No focal deficit present.      Mental Status: She is alert and oriented to person, place, and time. Mental status is at baseline.      Motor: No weakness.   Psychiatric:         Mood and Affect: Mood normal.         Thought Content: Thought content normal.        Result Review :  The following data was reviewed by: IGNACIO Lynch on 12/29/2022:  Common labs    Common Labs 6/27/22 10/6/22 10/6/22 12/6/22 12/6/22     1402 1402 0954 0954   Glucose   135 (A)  130 (A)   BUN   10  8   Creatinine   0.72  0.71   Sodium   140  141   Potassium   4.9 (A)  5.3 (A)   Chloride   104  102   Calcium   10.0  10.3 (A)   Albumin   4.90  4.90   Total Bilirubin   0.6  0.5   Alkaline Phosphatase   72  77   AST (SGOT)   27  32   ALT (SGPT)   18  22   WBC 8.71 9.33  8.17    Hemoglobin 15.3 14.3  13.8    Hematocrit 44.5 42.8  40.8    Platelets 141 162  181    (A) Abnormal value                      Assessment and Plan   Diagnoses and all orders for this visit:    1. Exertional dyspnea (Primary)  Assessment & Plan:  PFT's ordered.   CT chest ordered.   2D echo ordered.   Advised to continue with cessation of cigarette use.   Denies any audible wheezing or chest tightness.   With test results, will possibly need further consultation with cardiology/pulmonary depending on results.     Orders:  -     CT Chest Low Dose Wo; Future  -     Adult Transthoracic Echo Complete W/ Cont if Necessary Per Protocol; Future  -     Full Pulmonary Function Test With Bronchodilator; Future    2. Tobacco abuse  Assessment & Plan:  Recommended cessation.         3. Primary  hypertension  Assessment & Plan:  Hypertension is improving with treatment.  Continue current treatment regimen.  Blood pressure will be reassessed at the next regular appointment.      4. Left carotid stenosis  Assessment & Plan:  Following with vascular.   Continues on aspirin and lipitor.       5. Coronary artery disease involving native coronary artery of native heart without angina pectoris  Assessment & Plan:  Currently on aspirin.       6. Osteoporosis, unspecified osteoporosis type, unspecified pathological fracture presence  Assessment & Plan:  Possibly starting evista-- will be discussing with oncologist.       7. Malignant neoplasm of right breast in female, estrogen receptor positive, unspecified site of breast (HCC)  Assessment & Plan:  Following with oncology.   Completed radiation tx in the fall 2022.   Working on starting evista-- would like to discuss this more with oncologist due to risk of side effects.       8. Mixed hyperlipidemia  Assessment & Plan:  Lipid abnormalities are improving with treatment.  Nutritional counseling was provided. and Pharmacotherapy as ordered.  Lipids will be reassessed in 6 months.    Orders:  -     Lipid panel           Follow Up   Return in about 6 months (around 6/29/2023) for Medicare Wellness.  Patient was given instructions and counseling regarding her condition or for health maintenance advice. Please see specific information pulled into the AVS if appropriate.

## 2022-12-29 NOTE — ASSESSMENT & PLAN NOTE
Currently on aspirin.   
Following with oncology.   Completed radiation tx in the fall 2022.   Working on starting evista-- would like to discuss this more with oncologist due to risk of side effects.   
Following with vascular.   Continues on aspirin and lipitor.   
Hypertension is improving with treatment.  Continue current treatment regimen.  Blood pressure will be reassessed at the next regular appointment.  
Lipid abnormalities are improving with treatment.  Nutritional counseling was provided. and Pharmacotherapy as ordered.  Lipids will be reassessed in 6 months.  
PFT's ordered.   CT chest ordered.   2D echo ordered.   Advised to continue with cessation of cigarette use.   Denies any audible wheezing or chest tightness.   With test results, will possibly need further consultation with cardiology/pulmonary depending on results.   
Possibly starting evista-- will be discussing with oncologist.   
Recommended cessation.     
patient refused

## 2022-12-30 LAB
CHOLEST SERPL-MCNC: 161 MG/DL (ref 0–200)
HDLC SERPL-MCNC: 77 MG/DL (ref 40–60)
LDLC SERPL CALC-MCNC: 63 MG/DL (ref 0–100)
TRIGL SERPL-MCNC: 121 MG/DL (ref 0–150)
VLDLC SERPL CALC-MCNC: 21 MG/DL (ref 5–40)

## 2023-01-18 ENCOUNTER — HOSPITAL ENCOUNTER (OUTPATIENT)
Dept: MAMMOGRAPHY | Facility: HOSPITAL | Age: 68
Discharge: HOME | End: 2023-01-18
Admitting: INTERNAL MEDICINE
Payer: MEDICARE

## 2023-01-18 DIAGNOSIS — Z12.31 SCREENING MAMMOGRAM FOR BREAST CANCER: ICD-10-CM

## 2023-01-18 PROCEDURE — 77067 SCR MAMMO BI INCL CAD: CPT

## 2023-01-18 PROCEDURE — 77063 BREAST TOMOSYNTHESIS BI: CPT

## 2023-02-12 NOTE — PROGRESS NOTES
General Surgery Breast Cancer History and Physical Exam     Summary:    Arline Zaragoza is a 67 y.o. lady who presents with a history of right breast invasive ductal carcinoma: Grade 3, ER +/NH +, Her2 -; [id number], Anatomic Stage 1A, Prognostic Stage 1A.      A multidisciplinary plan has been formulated for the patient:  (1) Breast Surgical Oncology:  - [employer] 9 panel genetic testing: variation of uncertain significance of the BRCA2 gene  - S/p right breast wire localized lumpectomy with SLN biopsy 4/2022 with Dr. Bah.   - Mammogram 1/2023 reviewed, BIRADS 2.   - Annual screening mammogram due 1/2024.  - Follow up 6 months.    (2) Medical Oncology:  - Following with Dr. Desai, next appointment 2/16/23.   - Oncotype Dx 20.  - Started Tamoxifen but could not tolerate.     (3) Radiation Oncology:  - Completed adjuvant radiation therapy 7/14/22-8/10/22.     (4) Health Maintenance:  - Colonoscopy 08/2021 with Dr. Kate, up to date.     Referring Provider: No ref. provider found    Chief Complaint: abnormal breast imaging    History of Present Illness: Ms. Arline Zaragoza is a 67 y.o. year old lady, seen at the request of No ref. provider found for a new diagnosis of right breast cancer.      This was initially detected as an imaging abnormality. She has not had annual mammograms each year and skipped many years. Her most recent prior mammogram was at least 10 years ago. She denies any prior history of abnormal mammograms or breast biopsies. Her work-up is detailed in the oncologic history below.     She denies any palpable breast lumps, pain, skin changes, or nipple discharge. She denies any family history of breast or ovarian cancer.     5/16/2022 she is doing well since surgery with no complaints or problems.  She did have a little leaking from her incision last night but that was the only episode and it is now stopped.  She is getting back to her daily activities.    8/15/2022 She presents today for follow up. She  completed radiation last week. She complains of some axillary pain. She had fatigue and aches during radiation.     2/14/2023 Patient is here today for a follow-up. She states she has occasional discomfort at her breast incision. Denies any new masses or skin changes. Overall, she reports she is doing okay.     Workup of Current Diagnosis:    01/17/2022 - Bilateral Screening Mammogram:  Right breast  Impression: Architectural distortion.  BIRADS 0    02/15/2022 - Diagnostic Mammogram with Ultrasound:   Right breast - There is a persistent area of architecture distortion in the upper central posterior right breast on ultrasound. At 11:30, 4 cm from the nipple, there is an incidental 0.7 cm benign appearing cluster cyst. At 12:00, 4 cm from the nipple, there is a vague regular hypoechoic mass area of shadowing measuring 0.7 x 0.6 x 0.6 cm in that region.  Impression: Suspicious, recommend stereotactic biopsy.  BIRADS 4    Pathology:   Pathology: Right breast 12:00, 4 cm from the nipple invasive ductal carcinoma, grade 2, measures up to 6.5 mm, ER positive, WA positive, HER2 negative, Ki-67 3%.    3/16/2022 Bilateral Breast MRI:   IMPRESSION:   IMPRESSION:  1. Biopsy-proven malignancy in the right breast in the posterior one-third at the 12-o'clock position measuring on the order of 1.1 cm in greatest dimension with the bowtie-shaped metallic clip positioned on the order of 0.7 cm superior and slightly medial to the epicenter of the suspected residual malignancy within a hematoma cavity that measures on the order of 2 cm in greatest dimension. No other suspicious findings are seen within the right breast and there is no evidence for right axillary adenopathy.  2. There are no findings suspicious for malignancy in the left breast.   BI-RADS category 6: Known biopsy-proven malignancy.    4/28/2022 Right breast wire localized lumpectomy with sentinel lymph node biopsy  Final Diagnosis   1. Right Breast, Oriented Needle  Localization Lumpectomy (13 grams):  INVASIVE MAMMARY CARCINOMA, NO      SPECIAL TYPE (INVASIVE DUCTAL CARCINOMA).               A. Tumor site:  12:00 o'clock.               B. Histologic Grade:  Latonya.               C. Histologic Score:  II (tubules = 3, nuclei = 2, mitoses = 1).               D. Tumor size:  5 x 5 x 5 mm.               E. Tumor focality:  Single focus.               F. No definitive in-situ component identified.                G. No lobular neoplasia identified.               H. No lymphovascular nor perineural invasion identified.               I. Margins:  Uninvolved by invasive carcinoma.                            1. Invasive carcinoma is present 2.4 mm from the original inferior margin, 5 mm from the medial margin,                                10 mm from the superior margin, 1 mm from the anterior, 18 mm from the posterior margin and 20 mm                                lateral margin of resection in specimen 1.               J. Lymph node:  Seven total lymph nodes, negative for metastatic carcinoma (0/7) (specimens 6-8).               K. Hormone receptor status:  ER positive (99%), OH positive (25%), HER-2 negative (1+), Ki67 3% (performed on                   prior biopsy [id number]).               L. Pathologic stage:  pT1a,N0.  2. Right Breast, Additional Superior Margin, Oriented Excision:                 A. Benign unremarkable breast tissue.               B. Final superior margin free by an additional 7 mm.    3. Right Breast, Additional Lateral Margin, Oriented Excision:               A. Benign breast tissue with clustered apocrine cyst.               B. Final lateral margin free by an additional 12 mm.  4. Right Breast, Additional Medial Margin, Oriented Excision:               A. Benign unremarkable breast tissue.               B. Final medial margin free by an additional 6 mm.  5. Right Breast, Additional Inferior Margin, Oriented Excision:               A. Benign breast  tissue with focal usual ductal hyperplasia.               B. No atypical hyperplasia, in-situ nor invasive carcinoma identified.                 C. Final inferior margin free by an additional 6 mm.    6. [place] Lymph Node #1, Right Axilla, Excision:                 A. Five lymph nodes, negative for metastatic carcinoma (0/5).  7. [place] Lymph Node #2, Right Axilla, Excision:               A. One lymph node, negative for metastatic carcinoma (0/1).  8. [place] Lymph Node #3, Right Axilla, Excision:                 A. One lymph node, negative for metastatic carcinoma (0/1).     2023 Bilateral Screening Mammogram:  FINDINGS: Bilateral digital CC and MLO mammographic and digital Tomosynthesis images were obtained. Comparison is made to prior studies dated 3/9/2022, 2/15/2022, 2022 and 2013 . Scattered fibroglandular densities are seen throughout both breasts in a pattern which is unchanged. Stable postsurgical change of the right breast is noted. Skin thickening overlying the inferior aspect of the right breast is noted. I see no new or dominant masses, areas of architectural distortion or skin thickening. There is no evidence for axillary lymphadenopathy or nipple retraction.  IMPRESSION:  1. There is no evidence for malignancy or significant change in either breast. Routine followup mammography is recommended.  BI-RADS category 2: Benign.    Gynecologic History:   . P:2 AB:0  Age at first childbirth: 28 or 29  Lactation/How long: Did not breastfeed.  Age at menarche: 15  Age at menopause: 45  Total years of oral contraceptive use: 2  Total years of hormone replacement therapy: 0    Past Medical History:   Past Medical History:   Diagnosis Date   • Back pain      had ruptured disc no surgery so prn c/o pain   • Breast cancer (HCC) 2022    Right breast invasive ductal adenocarcinoma, ER/NH positive, HER2 negative   • Carotid artery stenosis    • Cervical cancer (HCC)    • Colon polyp  08/20/2021    Rectum: hyperplastic polyp   • Diverticulosis    • Fibromuscular dysplasia (HCC)     dx 2 yrs ago   • H/O bronchitis    • Hx of radiation therapy    • Hyperlipidemia    • Hypertension    • Loose stools    • Osteoporosis    • Positive colorectal cancer screening using Cologuard test    • Psoriasis      Past Surgical History:    Past Surgical History:   Procedure Laterality Date   • BREAST BIOPSY Right 03/09/2022    US guided mammotome vaccum assisted right breast biopsy-Dr. Julián Palacios, [facility]   • BREAST LUMPECTOMY     • BREAST LUMPECTOMY WITH SENTINEL NODE BIOPSY Right 04/28/2022    Procedure: RIGHT BREAST WIRE LOCALIZED LUMPECTOMY WITH SENTINEL NODE BIOPSY;  Surgeon: Allie Bah MD;  Location:  [facility];  Service: General;  Laterality: Right;   • COLONOSCOPY N/A 08/20/2021    Procedure: COLONOSCOPY INTO CECUM AND TI WITH COLD BX POLYPECTOMIES;  Surgeon: Catia Kaet MD;  [facility];  Service: Gastroenterology;  Laterality: N/A;  PRE: POSITIVE COLOGUARD  POST: DIVERTICULOSIS, POLYPS, HEMORRHOIDS   • FL TEAEC W/PATCH GRF CAROTID VERTB SUBCLAV NECK INC Left 09/14/2016    Procedure: LT CAROTID ENDARTERECTOMY WITH INTRA OPERATIVE CAROTID DUPLEX SCAN;  Surgeon: Arnulfo Hernandez MD;  Location: [facility] MAIN OR;  Service: Vascular   • SUBTOTAL HYSTERECTOMY Bilateral 09/1987    done at [facility], ovaries still in tact     Family History:    Family History   Problem Relation Age of Onset   • Vision loss Mother    • Skin cancer Father    • Skin cancer Sister    • Depression Daughter    • Malig Hyperthermia Neg Hx    • Breast cancer Neg Hx    • Ovarian cancer Neg Hx      Social History:  • Smokes cigarettes, 1 pack per day  • Occasional alcohol use    Allergies:   No Known Allergies    Medications:     Current Outpatient Medications:   •  aspirin 81 MG EC tablet, Take 81 mg by mouth Daily., Disp: , Rfl:   •  atorvastatin (LIPITOR) 20 MG tablet, Take 1 tablet by mouth Every Night., Disp:  90 tablet, Rfl: 3  •  Calcium Carbonate (CALCIUM 500 PO), Take 1 tablet by mouth Daily., Disp: , Rfl:   •  irbesartan (AVAPRO) 150 MG tablet, TAKE ONE TABLET BY MOUTH DAILY, Disp: 30 tablet, Rfl: 3  •  raloxifene (EVISTA) 60 MG tablet, Take 1 tablet by mouth Daily., Disp: 30 tablet, Rfl: 5    Laboratory Values:    Labs from 12/2022 reviewed    Review of Systems:   Influenza-like illness: no fever, no cough, no sore throat, no body aches, no loss of sense of taste or smell, no known exposure to person with Covid-19.  Constitutional: Negative for fevers or chills  HENT: Negative for hearing loss or runny nose  Eyes: Negative for vision changes or scleral icterus  Respiratory: Negative for cough or shortness of breath  Cardiovascular: Negative for chest pain or heart palpitations  Gastrointestinal: Negative for abdominal pain, nausea, vomiting, constipation, melena, or hematochezia  Genitourinary: Negative for hematuria or dysuria  Musculoskeletal: Negative for joint swelling or gait instability  Neurologic: Negative for tremors or seizures  Psychiatric: Negative for suicidal ideations or depression  All other systems reviewed and negative    Physical Exam:   • Constitutional: Well-developed, well-nourished, no acute distress  • Eyes: Conjunctiva normal, sclera nonicteric  • ENMT: Hearing grossly normal, oral mucosa moist  • Neck: Supple, no palpable mass, trachea midline  • Respiratory: Clear to auscultation, normal inspiratory effort  • Cardiovascular: Regular rate, no peripheral edema, no jugular venous distention  • Breast: symmetric,   o Right: No visible abnormalities on inspection while seated, with arms raised or hands on hips. No masses, skin changes, or nipple abnormalities. Right breast rosalia-areolar 10:00-1:00 incision healed, axillary incision well healed  o Left:  No visible abnormalities on inspection while seated, with arms raised or hands on hips. No masses, skin changes, or nipple abnormalities.  o No  clinical chest wall involvement.  • Lymphatics (palpable nodes): No cervical, supraclavicular, or axillary lymphadenopathy  • Skin: Warm, dry, no rash on visualized skin surfaces  • Musculoskeletal: Symmetric strength, normal gait  • Psychiatric: Alert and oriented ×3, normal affect     Alaina Bermudez PA-C  General Surgery     [facility]  [address], [address]  [place] [address]  P: [phone]  F: [phone]

## 2023-02-14 ENCOUNTER — OFFICE VISIT (OUTPATIENT)
Dept: SURGERY | Facility: CLINIC | Age: 68
End: 2023-02-14
Payer: MEDICARE

## 2023-02-14 VITALS — WEIGHT: 129.4 LBS | HEIGHT: 65 IN | BODY MASS INDEX: 21.56 KG/M2

## 2023-02-14 DIAGNOSIS — Z17.0 MALIGNANT NEOPLASM OF RIGHT BREAST IN FEMALE, ESTROGEN RECEPTOR POSITIVE, UNSPECIFIED SITE OF BREAST: Primary | ICD-10-CM

## 2023-02-14 DIAGNOSIS — C50.911 MALIGNANT NEOPLASM OF RIGHT BREAST IN FEMALE, ESTROGEN RECEPTOR POSITIVE, UNSPECIFIED SITE OF BREAST: Primary | ICD-10-CM

## 2023-02-14 PROCEDURE — 99213 OFFICE O/P EST LOW 20 MIN: CPT

## 2023-02-16 ENCOUNTER — OFFICE VISIT (OUTPATIENT)
Dept: ONCOLOGY | Facility: CLINIC | Age: 68
End: 2023-02-16
Payer: MEDICARE

## 2023-02-16 ENCOUNTER — LAB (OUTPATIENT)
Dept: LAB | Facility: HOSPITAL | Age: 68
End: 2023-02-16
Payer: MEDICARE

## 2023-02-16 VITALS
SYSTOLIC BLOOD PRESSURE: 150 MMHG | WEIGHT: 131.2 LBS | RESPIRATION RATE: 18 BRPM | BODY MASS INDEX: 21.86 KG/M2 | TEMPERATURE: 97.3 F | HEART RATE: 76 BPM | DIASTOLIC BLOOD PRESSURE: 83 MMHG | OXYGEN SATURATION: 99 % | HEIGHT: 65 IN

## 2023-02-16 DIAGNOSIS — Z17.0 MALIGNANT NEOPLASM OF RIGHT BREAST IN FEMALE, ESTROGEN RECEPTOR POSITIVE, UNSPECIFIED SITE OF BREAST: Primary | ICD-10-CM

## 2023-02-16 DIAGNOSIS — Z17.0 MALIGNANT NEOPLASM OF RIGHT BREAST IN FEMALE, ESTROGEN RECEPTOR POSITIVE, UNSPECIFIED SITE OF BREAST: ICD-10-CM

## 2023-02-16 DIAGNOSIS — C50.911 MALIGNANT NEOPLASM OF RIGHT BREAST IN FEMALE, ESTROGEN RECEPTOR POSITIVE, UNSPECIFIED SITE OF BREAST: ICD-10-CM

## 2023-02-16 DIAGNOSIS — C50.911 MALIGNANT NEOPLASM OF RIGHT BREAST IN FEMALE, ESTROGEN RECEPTOR POSITIVE, UNSPECIFIED SITE OF BREAST: Primary | ICD-10-CM

## 2023-02-16 LAB
ALBUMIN SERPL-MCNC: 4.9 G/DL (ref 3.5–5.2)
ALBUMIN/GLOB SERPL: 2 G/DL (ref 1.1–2.4)
ALP SERPL-CCNC: 76 U/L (ref 38–116)
ALT SERPL W P-5'-P-CCNC: 25 U/L (ref 0–33)
ANION GAP SERPL CALCULATED.3IONS-SCNC: 12.2 MMOL/L (ref 5–15)
AST SERPL-CCNC: 28 U/L (ref 0–32)
BASOPHILS # BLD AUTO: 0.02 10*3/MM3 (ref 0–0.2)
BASOPHILS NFR BLD AUTO: 0.3 % (ref 0–1.5)
BILIRUB SERPL-MCNC: 0.5 MG/DL (ref 0.2–1.2)
BUN SERPL-MCNC: 10 MG/DL (ref 6–20)
BUN/CREAT SERPL: 13.5 (ref 7.3–30)
CALCIUM SPEC-SCNC: 9.7 MG/DL (ref 8.5–10.2)
CHLORIDE SERPL-SCNC: 103 MMOL/L (ref 98–107)
CO2 SERPL-SCNC: 23.8 MMOL/L (ref 22–29)
CREAT SERPL-MCNC: 0.74 MG/DL (ref 0.6–1.1)
DEPRECATED RDW RBC AUTO: 47.2 FL (ref 37–54)
EGFRCR SERPLBLD CKD-EPI 2021: 89.4 ML/MIN/1.73
EOSINOPHIL # BLD AUTO: 0.04 10*3/MM3 (ref 0–0.4)
EOSINOPHIL NFR BLD AUTO: 0.5 % (ref 0.3–6.2)
ERYTHROCYTE [DISTWIDTH] IN BLOOD BY AUTOMATED COUNT: 13.4 % (ref 12.3–15.4)
GLOBULIN UR ELPH-MCNC: 2.4 GM/DL (ref 1.8–3.5)
GLUCOSE SERPL-MCNC: 129 MG/DL (ref 74–124)
HCT VFR BLD AUTO: 42.7 % (ref 34–46.6)
HGB BLD-MCNC: 13.7 G/DL (ref 12–15.9)
IMM GRANULOCYTES # BLD AUTO: 0.02 10*3/MM3 (ref 0–0.05)
IMM GRANULOCYTES NFR BLD AUTO: 0.3 % (ref 0–0.5)
LYMPHOCYTES # BLD AUTO: 1.75 10*3/MM3 (ref 0.7–3.1)
LYMPHOCYTES NFR BLD AUTO: 23.6 % (ref 19.6–45.3)
MCH RBC QN AUTO: 30.4 PG (ref 26.6–33)
MCHC RBC AUTO-ENTMCNC: 32.1 G/DL (ref 31.5–35.7)
MCV RBC AUTO: 94.9 FL (ref 79–97)
MONOCYTES # BLD AUTO: 0.62 10*3/MM3 (ref 0.1–0.9)
MONOCYTES NFR BLD AUTO: 8.4 % (ref 5–12)
NEUTROPHILS NFR BLD AUTO: 4.97 10*3/MM3 (ref 1.7–7)
NEUTROPHILS NFR BLD AUTO: 66.9 % (ref 42.7–76)
NRBC BLD AUTO-RTO: 0 /100 WBC (ref 0–0.2)
PLATELET # BLD AUTO: 170 10*3/MM3 (ref 140–450)
PMV BLD AUTO: 10.5 FL (ref 6–12)
POTASSIUM SERPL-SCNC: 4.8 MMOL/L (ref 3.5–4.7)
PROT SERPL-MCNC: 7.3 G/DL (ref 6.3–8)
RBC # BLD AUTO: 4.5 10*6/MM3 (ref 3.77–5.28)
SODIUM SERPL-SCNC: 139 MMOL/L (ref 134–145)
WBC NRBC COR # BLD: 7.42 10*3/MM3 (ref 3.4–10.8)

## 2023-02-16 PROCEDURE — 99213 OFFICE O/P EST LOW 20 MIN: CPT | Performed by: INTERNAL MEDICINE

## 2023-02-16 PROCEDURE — 36415 COLL VENOUS BLD VENIPUNCTURE: CPT

## 2023-02-16 PROCEDURE — 80053 COMPREHEN METABOLIC PANEL: CPT

## 2023-02-16 PROCEDURE — 85025 COMPLETE CBC W/AUTO DIFF WBC: CPT

## 2023-02-16 NOTE — PROGRESS NOTES
Subjective     REASON FOR FOLLOW-UP:  . T1a/T1BN0 invasive ductal carcinoma of the right breast, 12 o'clock position, grade 2 with a Latonya score of 6 with tumor size of 5 x 5 x 5 mm, on biopsy was 6.5 mm, margins were uninvolved by invasive carcinoma.  7 lymph nodes were negative.  ER 99%, HI 25%, HER2/sanchez 1+ negative, Ki-67 3%.  Margins are clear.    HISTORY OF PRESENT ILLNESS:    Patient is a 67-year-old female recently diagnosed with a T1 a/T1b N0 invasive ductal carcinoma of the right breast grade 2, ER/HI positive HER2/sanchez negative.     Her bone density showed evidence of osteoporosis.  We had discussed aromatase inhibitor with Prolia.  However she is not keen on taking Prolia and now is agreeable for tamoxifen.  She does take a baby aspirin.  She has never had blood clots and never had depression.  We did discuss in length side effects of tamoxifen today and she is agreeable to start tamoxifen as she does not want to take anything that can affect her bones.    Interval history:  Patient is doing well on Evista except has a feeling of swelling of the feet in the night since she started Evista.  She has gained some weight but nothing significant.  We discussed about aromatase inhibitor therapy but patient has severe osteoporosis and refuses to take Prolia or Fosamax.  She prefers to continue the Evista at present.  She has a mammogram January 18, 2023 which is negative.      ONCOLOGIC HISTORY:  Patient is a 67-year-old female who was found to have an abnormality seen on the screening mammogram.  She had skipped mammograms for many years.  Her most recent mammogram was at least 10 years ago.  She denied having any masses that was palpable.    Details are as follows.    January 17, 2022:  FINDINGS:  The breasts are heterogeneously dense, which may obscure small masses.     There is an area of architectural distortion in the upper inner  posterior right breast, which is best appreciated on Tomosynthesis  and  on the MLO view. There are no suspicious masses, calcifications, or  areas of architectural distortion in the left breast.    IMPRESSION/RECOMMENDATION(S):  Right breast architectural distortion. Recommend further evaluation with  CC and MLO spot compression and lateral views with Tomosynthesis and  targeted ultrasound.    February 15, 2022:  Right breast diagnostic mammogram and ultrasound  There is a persistent area of architectural distortion in the upper  central posterior right breast, best appreciated with Tomosynthesis.  This area was further assessed with ultrasound.     ULTRASOUND:  Targeted sonographic evaluation of the right breast was performed from  12:00 to 1:00 in the region of the mammographic abnormality. At 11:30, 4  cm from the nipple, there is an incidental 0.6 x 0.3 x 0.7 cm  benign-appearing cluster of cysts. At 12:00, 4 cm from the nipple, there  is a vague irregular hypoechoic mass/area of shadowing measuring 0.7 x  0.6 x 0.6 cm in the region of the mammographic distortion.        IMPRESSION:  Suspicious 0.7 cm mass/area of shadowing at 12:00 in the right breast.  Recommend further evaluation with ultrasound-guided core needle biopsy  and clip correlation with post biopsy mammogram. If the biopsy clip does  not correspond to the area of mammographic architectural distortion in  the upper central posterior right breast, further evaluation with  stereotactic/Tomosynthesis guided core needle biopsy would then be  recommended.    March 9, 2022: Ultrasound-guided right breast biopsy at 12:00 showed    Breast, Right, 12:00 o'clock, 4 cm from Nipple, Biopsy:  A. Invasive ductal adenocarcinoma, Latonya grade II (tubular grade=3, nuclear grade=2, mitotic grade=1).  B. Microcalcifications are associated with the invasive tumor and non-neoplastic tissue.  C. Ductal carcinoma in-situ is not identified.  D. The tumor measures up to 6.5 mm.    ER: 99%  CT: 25%  HER2/sanchez: 1+ negative  Ki-67  3%    March 17, 2022: MRI of the bilateral breast    IMPRESSION:  1. Biopsy-proven malignancy in the right breast in the posterior  one-third at the 12-o'clock position measuring on the order of 1.1 cm in  greatest dimension with the bowtie-shaped metallic clip positioned on  the order of 0.7 cm superior and slightly medial to the epicenter of the  suspected residual malignancy within a hematoma cavity that measures on  the order of 2 cm in greatest dimension. No other suspicious findings  are seen within the right breast and there is no evidence for right  axillary adenopathy.  2. There are no findings suspicious for malignancy in the left breast.     April 28, 2022: Right breast lumpectomy with sentinel lymph node surgery    Synoptic Checklist  INVASIVE CARCINOMA OF THE BREAST: Resection (INVASIVE CARCINOMA OF THE BREAST: COMPLETE EXCISION - All Specimens)  Procedure Excision (less than total mastectomy)  Specimen Laterality Right  .  TUMOR  Tumor Site Clock position  12 o'clock  Histologic Type Invasive carcinoma of no special type (ductal)  Histologic Grade (Aragon Histologic Score)  Glandular (Acinar) / Tubular Differentiation Score 3  Nuclear Pleomorphism  .  1. Breast, Right.  Mitotic Rate Score 1  Overall Grade Grade 2 (scores of 6 or 7)  Tumor Size Greatest dimension of largest invasive focus (Millimeters): 5 mm  Additional Dimension (Millimeters) 5 mm  5 mm  Tumor Focality Single focus of invasive carcinoma  Ductal Carcinoma In Situ (DCIS) Not identified  Lobular Carcinoma In Situ (LCIS) Not identified  Lymphovascular Invasion Not identified  Dermal Lymphovascular Invasion No skin present  Microcalcifications Present in invasive carcinoma  Present in non-neoplastic tissue  Treatment Effect in the Breast No known presurgical therapy  .  MARGINS  Margin Status for Invasive Carcinoma All margins negative for invasive carcinoma  Distance from Invasive Carcinoma to Closest Margin 8.4 mm  Closest Margin(s)  to Invasive Carcinoma Inferior  Distance from Invasive Carcinoma to Anterior Margin 11 mm  Distance from Invasive Carcinoma to Posterior Margin 18 mm  Distance from Invasive Carcinoma to Superior Margin 17 mm  Distance from Invasive Carcinoma to Inferior Margin 8.4 mm  Distance from Invasive Carcinoma to Medial Margin 11 mm  Distance from Invasive Carcinoma to Lateral Margin 22 mm  .  REGIONAL LYMPH NODES  Regional Lymph Node Status All regional lymph nodes negative for tumor  Total Number of Lymph Nodes Examined (sentinel and non-sentinel) 7  Number of [place] Nodes Examined 7  .      Past Medical History:   Diagnosis Date   • Back pain     2005 had ruptured disc no surgery so prn c/o pain   • Breast cancer (HCC) 03/09/2022    Right breast invasive ductal adenocarcinoma, ER/MI positive, HER2 negative   • Carotid artery stenosis    • Cervical cancer (HCC)    • Colon polyp 08/20/2021    Rectum: hyperplastic polyp   • Diverticulosis    • Fibromuscular dysplasia (HCC)     dx 2 yrs ago   • H/O bronchitis    • Hx of radiation therapy    • Hyperlipidemia    • Hypertension    • Loose stools    • Osteoporosis    • Positive colorectal cancer screening using Cologuard test    • Psoriasis         Past Surgical History:   Procedure Laterality Date   • BREAST BIOPSY Right 03/09/2022    US guided mammotome vaccum assisted right breast biopsy-Dr. Julián Palacios, [facility]   • BREAST LUMPECTOMY     • BREAST LUMPECTOMY WITH SENTINEL NODE BIOPSY Right 04/28/2022    Procedure: RIGHT BREAST WIRE LOCALIZED LUMPECTOMY WITH SENTINEL NODE BIOPSY;  Surgeon: Allie Bah MD;  [place] [facility] [place] [facility] [place]  Service: General;  Laterality: Right;   • COLONOSCOPY N/A 08/20/2021    Procedure: COLONOSCOPY INTO CECUM AND TI WITH COLD BX POLYPECTOMIES;  Surgeon: Catia Kate MD;  Location: [facility] ENDOSCOPY;  Service: Gastroenterology;  Laterality: N/A;  PRE: POSITIVE COLOGUARD  POST: DIVERTICULOSIS, POLYPS, HEMORRHOIDS   • MI TEAEC W/PATCH GRF CAROTID  VERTB SUBCLAV NECK INC Left 2016    Procedure: LT CAROTID ENDARTERECTOMY WITH INTRA OPERATIVE CAROTID DUPLEX SCAN;  Surgeon: Arnulfo Hernandez MD;  Location: [facility];  Service: Vascular   • SUBTOTAL HYSTERECTOMY Bilateral 1987    done at [facility], ovaries still in tact        Current Outpatient Medications on File Prior to Visit   Medication Sig Dispense Refill   • aspirin 81 MG EC tablet Take 81 mg by mouth Daily.     • atorvastatin (LIPITOR) 20 MG tablet Take 1 tablet by mouth Every Night. 90 tablet 3   • Calcium Carbonate (CALCIUM 500 PO) Take 1 tablet by mouth Daily.     • irbesartan (AVAPRO) 150 MG tablet TAKE ONE TABLET BY MOUTH DAILY 30 tablet 3   • raloxifene (EVISTA) 60 MG tablet Take 1 tablet by mouth Daily. 30 tablet 5     No current facility-administered medications on file prior to visit.        ALLERGIES:  No Known Allergies     Social History     Socioeconomic History   • Marital status: Single   Tobacco Use   • Smoking status: Every Day     Packs/day: 0.50     Years: 40.00     Pack years: 20.00     Types: Cigarettes     Start date: 1982   • Smokeless tobacco: Never   • Tobacco comments:     1/2 PPD   Vaping Use   • Vaping Use: Never used   Substance and Sexual Activity   • Alcohol use: Yes     Alcohol/week: 6.0 standard drinks     Types: 2 Glasses of wine, 4 Cans of beer per week   • Drug use: Never   • Sexual activity: Not Currently        Family History   Problem Relation Age of Onset   • Vision loss Mother    • Skin cancer Father    • Skin cancer Sister    • Depression Daughter    • Malig Hyperthermia Neg Hx    • Breast cancer Neg Hx    • Ovarian cancer Neg Hx       OB/GYN history:  Age of menarche: 15  Age of menopause: 45  Age of first childbirth 28   2 para 2 no miscarriages  Total number of years of contraceptive use: 2 years  No hormone replacement therapy given    Family history: Negative for breast or ovarian cancer.  Patient had cervical cancer when she was very  "young and had to undergo hysterectomy.    Review of Systems   Constitutional: Negative for appetite change, chills, diaphoresis, fatigue, fever and unexpected weight change.   HENT: Negative for hearing loss, sore throat and trouble swallowing.    Respiratory: Negative for cough, chest tightness, shortness of breath and wheezing.    Cardiovascular: Negative for chest pain, palpitations and leg swelling.   Gastrointestinal: Negative for abdominal distention, abdominal pain, constipation, diarrhea, nausea and vomiting.   Genitourinary: Negative for dysuria, frequency, hematuria and urgency.   Musculoskeletal: Negative for joint swelling.        No muscle weakness.   Skin: Negative for rash and wound.   Neurological: Negative for seizures, syncope, speech difficulty, weakness, numbness and headaches.   Hematological: Negative for adenopathy. Does not bruise/bleed easily.   Psychiatric/Behavioral: Negative for behavioral problems, confusion and suicidal ideas.   All other systems reviewed and are negative.    ROS reviewed and updated 02/16/23      Objective     Vitals:    02/16/23 1447   BP: 150/83   Pulse: 76   Resp: 18   Temp: 97.3 °F (36.3 °C)   TempSrc: Temporal   SpO2: 99%   Weight: 59.5 kg (131 lb 3.2 oz)   Height: 165.1 cm (65\")   PainSc: 0-No pain     Current Status 2/16/2023   ECOG score 0       Physical Exam         This patient's ACP documentation is up to date, and there's nothing further left to document.      CONSTITUTIONAL:  Vital signs reviewed.  No distress, looks comfortable.  RESPIRATORY:  Normal respiratory effort.  Lungs clear to auscultation bilaterally.  BREAST: Breast exam deferred as she had a breast exam just 2 days ago and was negative done by the surgery APRN  CARDIOVASCULAR:  Normal S1, S2.  No murmurs rubs or gallops.  No significant lower extremity edema.  GASTROINTESTINAL: Abdomen appears unremarkable.  Nontender.  No hepatomegaly.  No splenomegaly.  LYMPHATIC:  No cervical, " supraclavicular, axillary lymphadenopathy.  SKIN:  Warm.  No rashes.  PSYCHIATRIC:  Normal judgment and insight.  Normal mood and affect.    I have reexamined the patient and the results are consistent with the previously documented exam. Karo Desai MD       RECENT LABS:  Results from last 7 days   Lab Units 02/16/23  1353   WBC 10*3/mm3 7.42   NEUTROS ABS 10*3/mm3 4.97   HEMOGLOBIN g/dL 13.7   HEMATOCRIT % 42.7   PLATELETS 10*3/mm3 170                 Assessment & Plan       1. T1a/T1BN0 invasive ductal carcinoma of the right breast, 12 o'clock position, grade 2 with a Latonya score of 6 with tumor size of 5 x 5 x 5 mm, on biopsy was 6.5 mm, margins were uninvolved by invasive carcinoma.  7 lymph nodes were negative.  ER 99%, KY 25%, HER2/sanchez 1+ negative, Ki-67 3%.  Margins are clear.  · Patient's Oncotype DX  shows a score of 20  · Given the patient is highly ER/KY positive HER2 negative and a small tumor we can consider endocrine therapy alone with Oncotype DX score of 20.  · Her benefit from chemotherapy is very minimal and hence we discussed about endocrine therapy  · We had discussed aromatase inhibitor with Prolia versus tamoxifen.  · Bone density does show osteoporosis and she is leaning towards tamoxifen as she does not want do anything that can affect the bones.  · Discussed in length side effects of tamoxifen.   · Patient is to start radiation starting July 11. 2022  · Patient is to start tamoxifen towards the end of radiation  · 10/6/2022: Tolerating tamoxifen extremely well  · 10/20/2022: Patient calling reporting significant depression (reports no history of depression prior), hair loss and weight gain.  Patient started to hold tamoxifen.  · 12/6/2022: Patient reviewed back today with stabilization of weight and improved mood.  Unfortunately patient has significant underlying osteoporosis and we have not been able to give her Prolia due to ongoing dental issues needing corrected.  I will need  to discuss alternative treatment options of any with Dr. Desai.  · February 16, 2023: Patient currently is not receiving aromatase inhibitor given the fact that she has osteoporosis which is severe and has had fractures and currently will continue Evista.  Complains of some swelling of the feet with every step but on exam it appears normal    2.  Osteoporosis, patient has not had bone density for some time  Bone density done January 17, 2022 did show osteoporosis    · Will start calcium and vitamin D supplementation  · We discussed about consideration of starting Prolia but patient refuses Prolia.  She also has gum issues and she has to have many of the teeth pulled.  · Unable to take Prolia because of gum/teeth issues.  · 12/6/2022 discussed again today the need to consider Prolia in order to give her AI therapy as outlined above.  She has not yet completed her dental work and was strongly urged to proceed with this.    3.  Family history of cancer: No family history of breast or ovarian cancer.  Patient had history of cervical cancer when she was [age] years  · .  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  · Clinical significance of the variant is uncertain.  Familial variation of uncertain significance testing is not recommended.    Plan  · Continue Evista  · Patient refuses any treatment for her osteoporosis  · Continue calcium and vitamin D3  · Reviewed mammogram from January 18, 2023 which is negative  · Follow-up with me in 3 months with labs      MD Dr. Glenys Stanford Dr.

## 2023-02-17 ENCOUNTER — HOSPITAL ENCOUNTER (OUTPATIENT)
Dept: CARDIOLOGY | Facility: HOSPITAL | Age: 68
Discharge: HOME | End: 2023-02-17
Payer: MEDICARE

## 2023-02-17 ENCOUNTER — HOSPITAL ENCOUNTER (OUTPATIENT)
Dept: RESPIRATORY THERAPY | Facility: HOSPITAL | Age: 68
Discharge: HOME | End: 2023-02-17
Payer: MEDICARE

## 2023-02-17 ENCOUNTER — HOSPITAL ENCOUNTER (OUTPATIENT)
Dept: CT IMAGING | Facility: HOSPITAL | Age: 68
Discharge: HOME | End: 2023-02-17
Payer: MEDICARE

## 2023-02-17 VITALS
BODY MASS INDEX: 21.83 KG/M2 | DIASTOLIC BLOOD PRESSURE: 71 MMHG | HEART RATE: 90 BPM | HEIGHT: 65 IN | SYSTOLIC BLOOD PRESSURE: 160 MMHG | WEIGHT: 131 LBS

## 2023-02-17 DIAGNOSIS — R06.09 EXERTIONAL DYSPNEA: ICD-10-CM

## 2023-02-17 LAB
AORTIC ARCH: 2.1 CM
BH CV ECHO MEAS - ACS: 1.63 CM
BH CV ECHO MEAS - AO MAX PG: 14.7 MMHG
BH CV ECHO MEAS - AO MEAN PG: 6.3 MMHG
BH CV ECHO MEAS - AO ROOT DIAM: 2.44 CM
BH CV ECHO MEAS - AO V2 MAX: 191.4 CM/SEC
BH CV ECHO MEAS - AO V2 VTI: 35.3 CM
BH CV ECHO MEAS - AVA(I,D): 1.82 CM2
BH CV ECHO MEAS - EDV(CUBED): 87.2 ML
BH CV ECHO MEAS - EDV(MOD-SP2): 64 ML
BH CV ECHO MEAS - EDV(MOD-SP4): 64 ML
BH CV ECHO MEAS - EF(MOD-BP): 63.1 %
BH CV ECHO MEAS - EF(MOD-SP2): 64.1 %
BH CV ECHO MEAS - EF(MOD-SP4): 62.5 %
BH CV ECHO MEAS - ESV(CUBED): 23.3 ML
BH CV ECHO MEAS - ESV(MOD-SP2): 23 ML
BH CV ECHO MEAS - ESV(MOD-SP4): 24 ML
BH CV ECHO MEAS - FS: 35.6 %
BH CV ECHO MEAS - IVS/LVPW: 1.05 CM
BH CV ECHO MEAS - IVSD: 1 CM
BH CV ECHO MEAS - LAT PEAK E' VEL: 8.7 CM/SEC
BH CV ECHO MEAS - LV DIASTOLIC VOL/BSA (35-75): 38.9 CM2
BH CV ECHO MEAS - LV MASS(C)D: 145.7 GRAMS
BH CV ECHO MEAS - LV MAX PG: 6.2 MMHG
BH CV ECHO MEAS - LV MEAN PG: 3.2 MMHG
BH CV ECHO MEAS - LV SYSTOLIC VOL/BSA (12-30): 14.6 CM2
BH CV ECHO MEAS - LV V1 MAX: 124.2 CM/SEC
BH CV ECHO MEAS - LV V1 VTI: 27.3 CM
BH CV ECHO MEAS - LVIDD: 4.4 CM
BH CV ECHO MEAS - LVIDS: 2.9 CM
BH CV ECHO MEAS - LVOT AREA: 2.35 CM2
BH CV ECHO MEAS - LVOT DIAM: 1.73 CM
BH CV ECHO MEAS - LVPWD: 0.96 CM
BH CV ECHO MEAS - MED PEAK E' VEL: 8.6 CM/SEC
BH CV ECHO MEAS - MV A DUR: 0.09 SEC
BH CV ECHO MEAS - MV A MAX VEL: 79.7 CM/SEC
BH CV ECHO MEAS - MV DEC SLOPE: 547.4 CM/SEC2
BH CV ECHO MEAS - MV DEC TIME: 0.18 MSEC
BH CV ECHO MEAS - MV E MAX VEL: 87.8 CM/SEC
BH CV ECHO MEAS - MV E/A: 1.1
BH CV ECHO MEAS - MV MAX PG: 4 MMHG
BH CV ECHO MEAS - MV MEAN PG: 2.8 MMHG
BH CV ECHO MEAS - MV P1/2T: 56.7 MSEC
BH CV ECHO MEAS - MV V2 VTI: 23.3 CM
BH CV ECHO MEAS - MVA(P1/2T): 3.9 CM2
BH CV ECHO MEAS - MVA(VTI): 2.8 CM2
BH CV ECHO MEAS - PA V2 MAX: 148 CM/SEC
BH CV ECHO MEAS - PULM A REVS DUR: 0.07 SEC
BH CV ECHO MEAS - PULM A REVS VEL: 43.3 CM/SEC
BH CV ECHO MEAS - PULM DIAS VEL: 57.4 CM/SEC
BH CV ECHO MEAS - PULM S/D: 1.43
BH CV ECHO MEAS - PULM SYS VEL: 82.3 CM/SEC
BH CV ECHO MEAS - QP/QS: 0.65
BH CV ECHO MEAS - RV MAX PG: 3.9 MMHG
BH CV ECHO MEAS - RV V1 MAX: 98.5 CM/SEC
BH CV ECHO MEAS - RV V1 VTI: 15.2 CM
BH CV ECHO MEAS - RVOT DIAM: 1.86 CM
BH CV ECHO MEAS - SI(MOD-SP2): 24.9 ML/M2
BH CV ECHO MEAS - SI(MOD-SP4): 24.3 ML/M2
BH CV ECHO MEAS - SUP REN AO DIAM: 1.7 CM
BH CV ECHO MEAS - SV(LVOT): 64.1 ML
BH CV ECHO MEAS - SV(MOD-SP2): 41 ML
BH CV ECHO MEAS - SV(MOD-SP4): 40 ML
BH CV ECHO MEAS - SV(RVOT): 41.4 ML
BH CV ECHO MEAS - TAPSE (>1.6): 2 CM
BH CV ECHO MEASUREMENTS AVERAGE E/E' RATIO: 10.15
BH CV XLRA - RV BASE: 2.7 CM
BH CV XLRA - RV LENGTH: 6.9 CM
BH CV XLRA - RV MID: 2.48 CM
BH CV XLRA - TDI S': 16.5 CM/SEC
LEFT ATRIUM VOLUME INDEX: 17.6 ML/M2
MAXIMAL PREDICTED HEART RATE: 153 BPM
SINUS: 2.46 CM
STJ: 2.5 CM
STRESS TARGET HR: 130 BPM

## 2023-02-17 PROCEDURE — 71271 CT THORAX LUNG CANCER SCR C-: CPT

## 2023-02-17 PROCEDURE — 94640 AIRWAY INHALATION TREATMENT: CPT

## 2023-02-17 PROCEDURE — 94060 EVALUATION OF WHEEZING: CPT

## 2023-02-17 PROCEDURE — 94729 DIFFUSING CAPACITY: CPT

## 2023-02-17 PROCEDURE — 93306 TTE W/DOPPLER COMPLETE: CPT | Performed by: INTERNAL MEDICINE

## 2023-02-17 PROCEDURE — 94726 PLETHYSMOGRAPHY LUNG VOLUMES: CPT

## 2023-02-17 PROCEDURE — 93306 TTE W/DOPPLER COMPLETE: CPT

## 2023-02-17 RX ORDER — ALBUTEROL SULFATE 2.5 MG/3ML
2.5 SOLUTION RESPIRATORY (INHALATION) ONCE
Status: COMPLETED | OUTPATIENT
Start: 2023-02-17 | End: 2023-02-17

## 2023-02-17 RX ADMIN — ALBUTEROL SULFATE 2.5 MG: 2.5 SOLUTION RESPIRATORY (INHALATION) at 11:13

## 2023-02-20 DIAGNOSIS — J44.9 CHRONIC OBSTRUCTIVE PULMONARY DISEASE, UNSPECIFIED COPD TYPE: Primary | ICD-10-CM

## 2023-02-20 RX ORDER — FLUTICASONE FUROATE AND VILANTEROL 100; 25 UG/1; UG/1
1 POWDER RESPIRATORY (INHALATION)
Qty: 28 EACH | Refills: 11 | Status: SHIPPED | OUTPATIENT
Start: 2023-02-20 | End: 2023-03-02

## 2023-02-20 RX ORDER — ALBUTEROL SULFATE 90 UG/1
2 AEROSOL, METERED RESPIRATORY (INHALATION) EVERY 4 HOURS PRN
Qty: 18 G | Refills: 11 | Status: SHIPPED | OUTPATIENT
Start: 2023-02-20 | End: 2023-03-02

## 2023-02-20 NOTE — PROGRESS NOTES
Please let patient know-- CT of chest showed some mild scarring in the lungs along with some possible emphysematous changes. This may be causing some of the shortness of breath she had been experiencing. Patient may benefit from starting on a daily inhaler to help control her symptoms and also I'd like to prescribe an emergency inhaler when she is feeling shortness of breath. If she is agreeable, please let me know. Due to her smoking hx, we will need to repeat the CT screening in 1 year to continue to monitor.

## 2023-02-22 ENCOUNTER — EXTERNAL PBMM DATA (OUTPATIENT)
Dept: PHARMACY | Facility: OTHER | Age: 68
End: 2023-02-22
Payer: MEDICARE

## 2023-03-02 ENCOUNTER — TELEPHONE (OUTPATIENT)
Dept: INTERNAL MEDICINE | Facility: CLINIC | Age: 68
End: 2023-03-02

## 2023-03-02 DIAGNOSIS — J44.9 CHRONIC OBSTRUCTIVE PULMONARY DISEASE, UNSPECIFIED COPD TYPE: Primary | ICD-10-CM

## 2023-03-02 RX ORDER — ALBUTEROL SULFATE 90 UG/1
2 AEROSOL, METERED RESPIRATORY (INHALATION) EVERY 4 HOURS PRN
Qty: 18 G | Refills: 11 | Status: SHIPPED | OUTPATIENT
Start: 2023-03-02 | End: 2023-05-16

## 2023-03-02 RX ORDER — BUDESONIDE AND FORMOTEROL FUMARATE DIHYDRATE 160; 4.5 UG/1; UG/1
2 AEROSOL RESPIRATORY (INHALATION)
Qty: 10.2 G | Refills: 11 | Status: SHIPPED | OUTPATIENT
Start: 2023-03-02 | End: 2023-03-23

## 2023-03-02 NOTE — TELEPHONE ENCOUNTER
Caller: Arline Zaragoza    Relationship: Self    Best call back number: [phone]    What was the call regarding: PATIENT STATED THAT [employer] SHETH PRESCRIBED TWO INHALERS AND SHE RECEIVED NOTICE FROM HER INSURANCE THAT BOTH INHALERS ARE NOT COVERED. PATIENT WOULD LIKE TO KNOW IF THERE ARE ANY ALTERNATIVES THAT ARE COVERED. PLEASE ADVISE.    Do you require a callback: YES

## 2023-03-02 NOTE — TELEPHONE ENCOUNTER
Sent in symbicort instead of breo per insurance paperwork stating approval.   Also sent in albuterol HFA proair for patient to use for emergency use.

## 2023-03-03 NOTE — TELEPHONE ENCOUNTER
Patient advised    Patient states she was able to get the Breo & Albuterol sulfate inhaler but was advised they will not cover after this fill. Advised patient to use up what she was given and then switch to the newly prescribed inhalers

## 2023-03-13 ENCOUNTER — TELEPHONE (OUTPATIENT)
Dept: INTERNAL MEDICINE | Facility: CLINIC | Age: 68
End: 2023-03-13
Payer: MEDICARE

## 2023-03-13 NOTE — TELEPHONE ENCOUNTER
Caller: Arline Zaragoza    Relationship: Self    Best call back number: [phone]    What is the best time to reach you: ANY TIME    Who are you requesting to speak with (clinical staff, provider,  specific staff member): CLINICAL STAFF    What was the call regarding: PATIENT SAYS THAT HER INSURANCE HAS NOTIFIED HER THAT TWO INHALERS ARE NOT COVERED.  SHE WOULD LIKE A CALLBACK TO DISCUSS.    Do you require a callback: YES    PLEASE ADVISE.

## 2023-03-14 ENCOUNTER — PATIENT MESSAGE (OUTPATIENT)
Dept: INTERNAL MEDICINE | Facility: CLINIC | Age: 68
End: 2023-03-14
Payer: MEDICARE

## 2023-03-14 DIAGNOSIS — J44.9 CHRONIC OBSTRUCTIVE PULMONARY DISEASE, UNSPECIFIED COPD TYPE: Primary | ICD-10-CM

## 2023-03-14 NOTE — TELEPHONE ENCOUNTER
Caller: Arline Zaragoza    Relationship to patient: Self    Best call back number: [phone]    Patient is needing:     RETURNING PHONE CALL TO MAO

## 2023-03-14 NOTE — TELEPHONE ENCOUNTER
Tried to callback, no answer. Advised through voicemail that I will send [employer] message. Please refer

## 2023-03-15 NOTE — TELEPHONE ENCOUNTER
From: Rima MONROY  To: Arline Zaragoza  Sent: 3/14/2023 12:12 PM EDT  Subject: Inhalers    Hello,     We received your message that your insurance does not cover the two inhalers that Allie sent in. Do you know what inhalers your insurance will cover?    TIERA Abarca

## 2023-03-19 DIAGNOSIS — I10 PRIMARY HYPERTENSION: ICD-10-CM

## 2023-03-20 RX ORDER — IRBESARTAN 150 MG/1
TABLET ORAL
Qty: 30 TABLET | Refills: 3 | Status: SHIPPED | OUTPATIENT
Start: 2023-03-20

## 2023-03-23 RX ORDER — BUDESONIDE AND FORMOTEROL FUMARATE DIHYDRATE 160; 4.5 UG/1; UG/1
2 AEROSOL RESPIRATORY (INHALATION)
Qty: 10.2 G | Refills: 12 | Status: SHIPPED | OUTPATIENT
Start: 2023-03-23

## 2023-05-01 ENCOUNTER — PATIENT MESSAGE (OUTPATIENT)
Dept: INTERNAL MEDICINE | Facility: CLINIC | Age: 68
End: 2023-05-01
Payer: MEDICARE

## 2023-05-01 NOTE — TELEPHONE ENCOUNTER
From: Arline Zaragoza  To: Allie Farah  Sent: 5/1/2023 11:15 AM EDT  Subject: High blood pressure    I am currently taking irbesartan 150 mg and my Bphas been consistently staying in mid to high 150/85. What should I do.

## 2023-05-11 ENCOUNTER — TELEPHONE (OUTPATIENT)
Dept: ONCOLOGY | Facility: CLINIC | Age: 68
End: 2023-05-11

## 2023-05-11 NOTE — TELEPHONE ENCOUNTER
Caller: Arline Zaragoza    Relationship to patient: Self    Best call back number: [phone]    Chief complaint: DEATH OF A CLOSE FRIEND    Type of visit: LAB & F/U 1     Requested date: CALL TO R/S     If rescheduling, when is the original appointment: 5/11/2023

## 2023-05-16 ENCOUNTER — LAB (OUTPATIENT)
Dept: LAB | Facility: HOSPITAL | Age: 68
End: 2023-05-16
Payer: MEDICARE

## 2023-05-16 ENCOUNTER — OFFICE VISIT (OUTPATIENT)
Dept: ONCOLOGY | Facility: CLINIC | Age: 68
End: 2023-05-16
Payer: MEDICARE

## 2023-05-16 VITALS
HEART RATE: 78 BPM | OXYGEN SATURATION: 99 % | WEIGHT: 130.9 LBS | RESPIRATION RATE: 16 BRPM | HEIGHT: 65 IN | SYSTOLIC BLOOD PRESSURE: 125 MMHG | BODY MASS INDEX: 21.81 KG/M2 | DIASTOLIC BLOOD PRESSURE: 83 MMHG | TEMPERATURE: 98 F

## 2023-05-16 DIAGNOSIS — Z17.0 MALIGNANT NEOPLASM OF RIGHT BREAST IN FEMALE, ESTROGEN RECEPTOR POSITIVE, UNSPECIFIED SITE OF BREAST: Primary | ICD-10-CM

## 2023-05-16 DIAGNOSIS — Z79.811 AROMATASE INHIBITOR USE: ICD-10-CM

## 2023-05-16 DIAGNOSIS — C50.911 MALIGNANT NEOPLASM OF RIGHT BREAST IN FEMALE, ESTROGEN RECEPTOR POSITIVE, UNSPECIFIED SITE OF BREAST: Primary | ICD-10-CM

## 2023-05-16 DIAGNOSIS — Z17.0 MALIGNANT NEOPLASM OF RIGHT BREAST IN FEMALE, ESTROGEN RECEPTOR POSITIVE, UNSPECIFIED SITE OF BREAST: ICD-10-CM

## 2023-05-16 DIAGNOSIS — C50.911 MALIGNANT NEOPLASM OF RIGHT BREAST IN FEMALE, ESTROGEN RECEPTOR POSITIVE, UNSPECIFIED SITE OF BREAST: ICD-10-CM

## 2023-05-16 LAB
ALBUMIN SERPL-MCNC: 4.6 G/DL (ref 3.5–5.2)
ALBUMIN/GLOB SERPL: 2 G/DL (ref 1.1–2.4)
ALP SERPL-CCNC: 70 U/L (ref 38–116)
ALT SERPL W P-5'-P-CCNC: 23 U/L (ref 0–33)
ANION GAP SERPL CALCULATED.3IONS-SCNC: 12.5 MMOL/L (ref 5–15)
AST SERPL-CCNC: 29 U/L (ref 0–32)
BASOPHILS # BLD AUTO: 0.05 10*3/MM3 (ref 0–0.2)
BASOPHILS NFR BLD AUTO: 0.7 % (ref 0–1.5)
BILIRUB SERPL-MCNC: 0.4 MG/DL (ref 0.2–1.2)
BUN SERPL-MCNC: 9 MG/DL (ref 6–20)
BUN/CREAT SERPL: 12.2 (ref 7.3–30)
CALCIUM SPEC-SCNC: 9.6 MG/DL (ref 8.5–10.2)
CHLORIDE SERPL-SCNC: 101 MMOL/L (ref 98–107)
CO2 SERPL-SCNC: 23.5 MMOL/L (ref 22–29)
CREAT SERPL-MCNC: 0.74 MG/DL (ref 0.6–1.1)
DEPRECATED RDW RBC AUTO: 48.8 FL (ref 37–54)
EGFRCR SERPLBLD CKD-EPI 2021: 88.8 ML/MIN/1.73
EOSINOPHIL # BLD AUTO: 0.04 10*3/MM3 (ref 0–0.4)
EOSINOPHIL NFR BLD AUTO: 0.5 % (ref 0.3–6.2)
ERYTHROCYTE [DISTWIDTH] IN BLOOD BY AUTOMATED COUNT: 13.6 % (ref 12.3–15.4)
GLOBULIN UR ELPH-MCNC: 2.3 GM/DL (ref 1.8–3.5)
GLUCOSE SERPL-MCNC: 142 MG/DL (ref 74–124)
HCT VFR BLD AUTO: 41.8 % (ref 34–46.6)
HGB BLD-MCNC: 13.6 G/DL (ref 12–15.9)
IMM GRANULOCYTES # BLD AUTO: 0.02 10*3/MM3 (ref 0–0.05)
IMM GRANULOCYTES NFR BLD AUTO: 0.3 % (ref 0–0.5)
LYMPHOCYTES # BLD AUTO: 1.99 10*3/MM3 (ref 0.7–3.1)
LYMPHOCYTES NFR BLD AUTO: 26.7 % (ref 19.6–45.3)
MCH RBC QN AUTO: 31.9 PG (ref 26.6–33)
MCHC RBC AUTO-ENTMCNC: 32.5 G/DL (ref 31.5–35.7)
MCV RBC AUTO: 98.1 FL (ref 79–97)
MONOCYTES # BLD AUTO: 0.46 10*3/MM3 (ref 0.1–0.9)
MONOCYTES NFR BLD AUTO: 6.2 % (ref 5–12)
NEUTROPHILS NFR BLD AUTO: 4.9 10*3/MM3 (ref 1.7–7)
NEUTROPHILS NFR BLD AUTO: 65.6 % (ref 42.7–76)
NRBC BLD AUTO-RTO: 0 /100 WBC (ref 0–0.2)
PLATELET # BLD AUTO: 175 10*3/MM3 (ref 140–450)
PMV BLD AUTO: 11.4 FL (ref 6–12)
POTASSIUM SERPL-SCNC: 4.3 MMOL/L (ref 3.5–4.7)
PROT SERPL-MCNC: 6.9 G/DL (ref 6.3–8)
RBC # BLD AUTO: 4.26 10*6/MM3 (ref 3.77–5.28)
SODIUM SERPL-SCNC: 137 MMOL/L (ref 134–145)
WBC NRBC COR # BLD: 7.46 10*3/MM3 (ref 3.4–10.8)

## 2023-05-16 PROCEDURE — 36415 COLL VENOUS BLD VENIPUNCTURE: CPT

## 2023-05-16 PROCEDURE — 80053 COMPREHEN METABOLIC PANEL: CPT

## 2023-05-16 PROCEDURE — 85025 COMPLETE CBC W/AUTO DIFF WBC: CPT

## 2023-05-16 RX ORDER — RALOXIFENE HYDROCHLORIDE 60 MG/1
60 TABLET, FILM COATED ORAL DAILY
Qty: 30 TABLET | Refills: 5 | Status: SHIPPED | OUTPATIENT
Start: 2023-05-16

## 2023-05-16 NOTE — PROGRESS NOTES
Subjective     REASON FOR FOLLOW-UP:  . T1a/T1BN0 invasive ductal carcinoma of the right breast, 12 o'clock position, grade 2 with a Latonya score of 6 with tumor size of 5 x 5 x 5 mm, on biopsy was 6.5 mm, margins were uninvolved by invasive carcinoma.  7 lymph nodes were negative.  ER 99%, AK 25%, HER2/sanchez 1+ negative, Ki-67 3%.  Margins are clear.    HISTORY OF PRESENT ILLNESS:    Patient is a 68 y.o. female recently diagnosed with a T1 a/T1b N0 invasive ductal carcinoma of the right breast grade 2, ER/AK positive HER2/sanchez negative.  She is reviewed back today in 3-month follow-up.  She continues on Evista with overall good tolerance.    Due to underlying osteoporosis and patient has not been keen on taking Prolia therefore not a good candidate for aromatase inhibitor therapy.  She prefers to stay on Evista.      Last mammogram 1/18/2023 benign.    Denies any new concerns today.    ONCOLOGIC HISTORY:  Patient is a 67-year-old female who was found to have an abnormality seen on the screening mammogram.  She had skipped mammograms for many years.  Her most recent mammogram was at least 10 years ago.  She denied having any masses that was palpable.    Details are as follows.    January 17, 2022:  FINDINGS:  The breasts are heterogeneously dense, which may obscure small masses.     There is an area of architectural distortion in the upper inner  posterior right breast, which is best appreciated on Tomosynthesis and  on the MLO view. There are no suspicious masses, calcifications, or  areas of architectural distortion in the left breast.    IMPRESSION/RECOMMENDATION(S):  Right breast architectural distortion. Recommend further evaluation with  CC and MLO spot compression and lateral views with Tomosynthesis and  targeted ultrasound.    February 15, 2022:  Right breast diagnostic mammogram and ultrasound  There is a persistent area of architectural distortion in the upper  central posterior right breast, best  appreciated with Tomosynthesis.  This area was further assessed with ultrasound.     ULTRASOUND:  Targeted sonographic evaluation of the right breast was performed from  12:00 to 1:00 in the region of the mammographic abnormality. At 11:30, 4  cm from the nipple, there is an incidental 0.6 x 0.3 x 0.7 cm  benign-appearing cluster of cysts. At 12:00, 4 cm from the nipple, there  is a vague irregular hypoechoic mass/area of shadowing measuring 0.7 x  0.6 x 0.6 cm in the region of the mammographic distortion.        IMPRESSION:  Suspicious 0.7 cm mass/area of shadowing at 12:00 in the right breast.  Recommend further evaluation with ultrasound-guided core needle biopsy  and clip correlation with post biopsy mammogram. If the biopsy clip does  not correspond to the area of mammographic architectural distortion in  the upper central posterior right breast, further evaluation with  stereotactic/Tomosynthesis guided core needle biopsy would then be  recommended.    March 9, 2022: Ultrasound-guided right breast biopsy at 12:00 showed    Breast, Right, 12:00 o'clock, 4 cm from Nipple, Biopsy:  A. Invasive ductal adenocarcinoma, Latonya grade II (tubular grade=3, nuclear grade=2, mitotic grade=1).  B. Microcalcifications are associated with the invasive tumor and non-neoplastic tissue.  C. Ductal carcinoma in-situ is not identified.  D. The tumor measures up to 6.5 mm.    ER: 99%  SC: 25%  HER2/sanchez: 1+ negative  Ki-67 3%    March 17, 2022: MRI of the bilateral breast    IMPRESSION:  1. Biopsy-proven malignancy in the right breast in the posterior  one-third at the 12-o'clock position measuring on the order of 1.1 cm in  greatest dimension with the bowtie-shaped metallic clip positioned on  the order of 0.7 cm superior and slightly medial to the epicenter of the  suspected residual malignancy within a hematoma cavity that measures on  the order of 2 cm in greatest dimension. No other suspicious findings  are seen within the  right breast and there is no evidence for right  axillary adenopathy.  2. There are no findings suspicious for malignancy in the left breast.     April 28, 2022: Right breast lumpectomy with sentinel lymph node surgery    Synoptic Checklist  INVASIVE CARCINOMA OF THE BREAST: Resection (INVASIVE CARCINOMA OF THE BREAST: COMPLETE EXCISION - All Specimens)  Procedure Excision (less than total mastectomy)  Specimen Laterality Right  .  TUMOR  Tumor Site Clock position  12 o'clock  Histologic Type Invasive carcinoma of no special type (ductal)  Histologic Grade (Montgomery Histologic Score)  Glandular (Acinar) / Tubular Differentiation Score 3  Nuclear Pleomorphism  .  1. Breast, Right.  Mitotic Rate Score 1  Overall Grade Grade 2 (scores of 6 or 7)  Tumor Size Greatest dimension of largest invasive focus (Millimeters): 5 mm  Additional Dimension (Millimeters) 5 mm  5 mm  Tumor Focality Single focus of invasive carcinoma  Ductal Carcinoma In Situ (DCIS) Not identified  Lobular Carcinoma In Situ (LCIS) Not identified  Lymphovascular Invasion Not identified  Dermal Lymphovascular Invasion No skin present  Microcalcifications Present in invasive carcinoma  Present in non-neoplastic tissue  Treatment Effect in the Breast No known presurgical therapy  .  MARGINS  Margin Status for Invasive Carcinoma All margins negative for invasive carcinoma  Distance from Invasive Carcinoma to Closest Margin 8.4 mm  Closest Margin(s) to Invasive Carcinoma Inferior  Distance from Invasive Carcinoma to Anterior Margin 11 mm  Distance from Invasive Carcinoma to Posterior Margin 18 mm  Distance from Invasive Carcinoma to Superior Margin 17 mm  Distance from Invasive Carcinoma to Inferior Margin 8.4 mm  Distance from Invasive Carcinoma to Medial Margin 11 mm  Distance from Invasive Carcinoma to Lateral Margin 22 mm  .  REGIONAL LYMPH NODES  Regional Lymph Node Status All regional lymph nodes negative for tumor  Total Number of Lymph Nodes  Examined (sentinel and non-sentinel) 7  Number of [place] Nodes Examined 7  .      Past Medical History:   Diagnosis Date   • Back pain     2005 had ruptured disc no surgery so prn c/o pain   • Breast cancer 03/09/2022    Right breast invasive ductal adenocarcinoma, ER/ID positive, HER2 negative   • Carotid artery stenosis    • Cervical cancer    • Colon polyp 08/20/2021    Rectum: hyperplastic polyp   • Diverticulosis    • Fibromuscular dysplasia     dx 2 yrs ago   • H/O bronchitis    • Hx of radiation therapy    • Hyperlipidemia    • Hypertension    • Loose stools    • Osteoporosis    • Positive colorectal cancer screening using Cologuard test    • Psoriasis         Past Surgical History:   Procedure Laterality Date   • BREAST BIOPSY Right 03/09/2022    US guided mammotome vaccum assisted right breast biopsy-Dr. Julián Palacios, [facility]   • BREAST LUMPECTOMY     • BREAST LUMPECTOMY WITH SENTINEL NODE BIOPSY Right 04/28/2022    Procedure: RIGHT BREAST WIRE LOCALIZED LUMPECTOMY WITH SENTINEL NODE BIOPSY;  Surgeon: Allie Bah MD;  Location: [facility] OR OSC;  Service: General;  Laterality: Right;   • COLONOSCOPY N/A 08/20/2021    Procedure: COLONOSCOPY INTO CECUM AND TI WITH COLD BX POLYPECTOMIES;  Surgeon: Catia Kate MD;  Location: [facility] ENDOSCOPY;  Service: Gastroenterology;  Laterality: N/A;  PRE: POSITIVE COLOGUARD  POST: DIVERTICULOSIS, POLYPS, HEMORRHOIDS   • ID TEAEC W/PATCH GRF CAROTID VERTB SUBCLAV NECK INC Left 09/14/2016    Procedure: LT CAROTID ENDARTERECTOMY WITH INTRA OPERATIVE CAROTID DUPLEX SCAN;  Surgeon: Arnulfo Hernandez MD;  Location: [facility] MAIN OR;  Service: Vascular   • SUBTOTAL HYSTERECTOMY Bilateral 09/1987    done at [facility], ovaries still in tact        Current Outpatient Medications on File Prior to Visit   Medication Sig Dispense Refill   • aspirin 81 MG EC tablet Take 1 tablet by mouth Daily.     • atorvastatin (LIPITOR) 20 MG tablet Take 1 tablet by mouth Every Night. 90 tablet 3    • budesonide-formoterol (Symbicort) 160-4.5 MCG/ACT inhaler Inhale 2 puffs 2 (Two) Times a Day. 10.2 g 12   • Calcium Carbonate (CALCIUM 500 PO) Take 1 tablet by mouth Daily.     • irbesartan (Avapro) 300 MG tablet Take 1 tablet by mouth Daily. 90 tablet 1   • raloxifene (EVISTA) 60 MG tablet Take 1 tablet by mouth Daily. 30 tablet 5   • [DISCONTINUED] albuterol sulfate HFA (ProAir HFA) 108 (90 Base) MCG/ACT inhaler Inhale 2 puffs Every 4 (Four) Hours As Needed for Wheezing. 18 g 11     No current facility-administered medications on file prior to visit.        ALLERGIES:  No Known Allergies     Social History     Socioeconomic History   • Marital status:    Tobacco Use   • Smoking status: Every Day     Packs/day: 0.50     Years: 40.00     Pack years: 20.00     Types: Cigarettes     Start date: 1982   • Smokeless tobacco: Never   • Tobacco comments:      PPD   Vaping Use   • Vaping Use: Never used   Substance and Sexual Activity   • Alcohol use: Yes     Alcohol/week: 6.0 standard drinks     Types: 2 Glasses of wine, 4 Cans of beer per week   • Drug use: Never   • Sexual activity: Not Currently        Family History   Problem Relation Age of Onset   • Vision loss Mother    • Skin cancer Father    • Skin cancer Sister    • Depression Daughter    • Malig Hyperthermia Neg Hx    • Breast cancer Neg Hx    • Ovarian cancer Neg Hx       OB/GYN history:  Age of menarche: 15  Age of menopause: 45  Age of first childbirth 28   2 para 2 no miscarriages  Total number of years of contraceptive use: 2 years  No hormone replacement therapy given    Family history: Negative for breast or ovarian cancer.  Patient had cervical cancer when she was very young and had to undergo hysterectomy.    Review of Systems   Constitutional: Negative for appetite change, chills, diaphoresis, fatigue, fever and unexpected weight change.   HENT: Negative for hearing loss, sore throat and trouble swallowing.    Respiratory:  "Negative for cough, chest tightness, shortness of breath and wheezing.    Cardiovascular: Negative for chest pain, palpitations and leg swelling.   Gastrointestinal: Negative for abdominal distention, abdominal pain, constipation, diarrhea, nausea and vomiting.   Genitourinary: Negative for dysuria, frequency, hematuria and urgency.   Musculoskeletal: Negative for joint swelling.        No muscle weakness.   Skin: Negative for rash and wound.   Neurological: Negative for seizures, syncope, speech difficulty, weakness, numbness and headaches.   Hematological: Negative for adenopathy. Does not bruise/bleed easily.   Psychiatric/Behavioral: Negative for behavioral problems, confusion and suicidal ideas.   All other systems reviewed and are negative.    ROS reviewed and updated 05/16/23      Objective     Vitals:    05/16/23 1338   BP: 125/83   Pulse: 78   Resp: 16   Temp: 98 °F (36.7 °C)   TempSrc: Temporal   SpO2: 99%   Weight: 59.4 kg (130 lb 14.4 oz)   Height: 165.1 cm (65\")   PainSc: 0-No pain         5/16/2023     1:20 PM   Current Status   ECOG score 0       Physical Exam         This patient's ACP documentation is up to date, and there's nothing further left to document.      CONSTITUTIONAL:  Vital signs reviewed.  No distress, looks comfortable.  RESPIRATORY:  Normal respiratory effort.  Lungs clear to auscultation bilaterally.  BREAST: Right breast with poorly healed scar at the 12 o'clock position just above the right nipple with some crusting and poor skin approximation though no actual open wound.  Left breast benign.  No axillary adenopathy.  CARDIOVASCULAR:  Normal S1, S2.  No murmurs rubs or gallops.  No significant lower extremity edema.  GASTROINTESTINAL: Abdomen appears unremarkable.  Nontender.  No hepatomegaly.  No splenomegaly.  LYMPHATIC:  No cervical, supraclavicular, axillary lymphadenopathy.  SKIN:  Warm.  No rashes.  PSYCHIATRIC:  Normal judgment and insight.  Normal mood and affect.    I have " reexamined the patient and the results are consistent with the previously documented exam except as updated. Herminia Carroll, IGNACIO       RECENT LABS:  Results from last 7 days   Lab Units 05/16/23  1312   WBC 10*3/mm3 7.46   NEUTROS ABS 10*3/mm3 4.90   HEMOGLOBIN g/dL 13.6   HEMATOCRIT % 41.8   PLATELETS 10*3/mm3 175     Results from last 7 days   Lab Units 05/16/23  1312   SODIUM mmol/L 137   POTASSIUM mmol/L 4.3   CHLORIDE mmol/L 101   CO2 mmol/L 23.5   BUN mg/dL 9   CREATININE mg/dL 0.74   CALCIUM mg/dL 9.6   ALBUMIN g/dL 4.6   BILIRUBIN mg/dL 0.4   ALK PHOS U/L 70   ALT (SGPT) U/L 23   AST (SGOT) U/L 29   GLUCOSE mg/dL 142*             Assessment & Plan       1. T1a/T1BN0 invasive ductal carcinoma of the right breast, 12 o'clock position, grade 2 with a Latonya score of 6 with tumor size of 5 x 5 x 5 mm, on biopsy was 6.5 mm, margins were uninvolved by invasive carcinoma.  7 lymph nodes were negative.  ER 99%, LA 25%, HER2/sanchez 1+ negative, Ki-67 3%.  Margins are clear 3/2022.  · Patient's Oncotype DX  shows a score of 20  · Given the patient is highly ER/LA positive HER2 negative and a small tumor we can consider endocrine therapy alone with Oncotype DX score of 20.  · Her benefit from chemotherapy is very minimal and hence we discussed about endocrine therapy  · We had discussed aromatase inhibitor with Prolia versus tamoxifen.  · Bone density does show osteoporosis and she is leaning towards tamoxifen as she does not want do anything that can affect the bones.  · Discussed in length side effects of tamoxifen.   · Patient is to start radiation starting July 11, 2022  · Patient is to start tamoxifen towards the end of radiation  · 10/6/2022: Tolerating tamoxifen extremely well  · 10/20/2022: Patient calling reporting significant depression (reports no history of depression prior), hair loss and weight gain.  Patient started to hold tamoxifen.  · 12/6/2022: Patient reviewed back today with stabilization  of weight and improved mood.  Unfortunately patient has significant underlying osteoporosis and we have not been able to give her Prolia due to ongoing dental issues needing corrected.  I will need to discuss alternative treatment options of any with Dr. Desai.  · February 16, 2023: Patient currently is not receiving aromatase inhibitor given the fact that she has osteoporosis which is severe and has had fractures and currently will continue Evista.    · 5/16/2023: RONAK on exam.  Patient does have persistent slightly crusted scar above the right nipple.  Encouraged her to use coconut oil or scar oil to this to help soften and perhaps help it to heal further.  No open wound thankfully.    2.  Osteoporosis, patient has not had bone density for some time  Bone density done January 17, 2022 did show osteoporosis  · Will start calcium and vitamin D supplementation  · We discussed about consideration of starting Prolia but patient refuses Prolia.  She also has gum issues and she has to have many of the teeth pulled.  · Unable to take Prolia because of gum/teeth issues.  · 12/6/2022 discussed again today the need to consider Prolia in order to give her AI therapy as outlined above.  She has not yet completed her dental work and was strongly urged to proceed with this.  · 5/2023 patient prefers to stay on Evista.  Due for DEXA scan again in January 2024.    3.  Family history of cancer: No family history of breast or ovarian cancer.  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  · Clinical significance of the variant is uncertain.  Familial variation of uncertain significance testing is not recommended.    Plan  · Continue Evista.  Refilled today.  · Continue calcium and vitamin D3  · Encouraged use of scar oil or coconut oil to right lumpectomy scar per above.  · Patient will follow-up with Dr. Desai in 4 months with CBC, CMP.  At  that visit schedule next DEXA scan due in January 2024 as well as mammogram also due in January 2024        IGNACIO Mayorga Dr., Dr.

## 2023-06-28 PROBLEM — R06.09 EXERTIONAL DYSPNEA: Status: RESOLVED | Noted: 2022-12-29 | Resolved: 2023-06-28

## 2023-09-13 ENCOUNTER — TELEPHONE (OUTPATIENT)
Dept: INTERNAL MEDICINE | Facility: CLINIC | Age: 68
End: 2023-09-13

## 2023-09-13 ENCOUNTER — HOSPITAL ENCOUNTER (INPATIENT)
Facility: HOSPITAL | Age: 68
LOS: 3 days | Discharge: HOME | DRG: 871 | End: 2023-09-16
Attending: STUDENT IN AN ORGANIZED HEALTH CARE EDUCATION/TRAINING PROGRAM | Admitting: INTERNAL MEDICINE
Payer: MEDICARE

## 2023-09-13 ENCOUNTER — APPOINTMENT (OUTPATIENT)
Dept: GENERAL RADIOLOGY | Facility: HOSPITAL | Age: 68
DRG: 871 | End: 2023-09-13
Payer: MEDICARE

## 2023-09-13 ENCOUNTER — APPOINTMENT (OUTPATIENT)
Dept: CT IMAGING | Facility: HOSPITAL | Age: 68
DRG: 871 | End: 2023-09-13
Payer: MEDICARE

## 2023-09-13 ENCOUNTER — TELEMEDICINE (OUTPATIENT)
Dept: INTERNAL MEDICINE | Facility: CLINIC | Age: 68
End: 2023-09-13
Payer: MEDICARE

## 2023-09-13 DIAGNOSIS — R11.2 NAUSEA AND VOMITING, UNSPECIFIED VOMITING TYPE: ICD-10-CM

## 2023-09-13 DIAGNOSIS — R42 DIZZINESS: ICD-10-CM

## 2023-09-13 DIAGNOSIS — R11.0 NAUSEA: Primary | ICD-10-CM

## 2023-09-13 DIAGNOSIS — R50.9 FEVER OF UNKNOWN ORIGIN: Primary | ICD-10-CM

## 2023-09-13 DIAGNOSIS — R29.818 SUSPECTED INFECTIOUS MENINGITIS: ICD-10-CM

## 2023-09-13 DIAGNOSIS — R93.0 ABNORMAL HEAD CT: ICD-10-CM

## 2023-09-13 LAB
ALBUMIN SERPL-MCNC: 4.6 G/DL (ref 3.5–5.2)
ALBUMIN/GLOB SERPL: 1.8 G/DL
ALP SERPL-CCNC: 73 U/L (ref 39–117)
ALT SERPL W P-5'-P-CCNC: 13 U/L (ref 1–33)
ANION GAP SERPL CALCULATED.3IONS-SCNC: 15.2 MMOL/L (ref 5–15)
APPEARANCE CSF: CLEAR
AST SERPL-CCNC: 23 U/L (ref 1–32)
B PARAPERT DNA SPEC QL NAA+PROBE: NOT DETECTED
B PERT DNA SPEC QL NAA+PROBE: NOT DETECTED
BILIRUB SERPL-MCNC: 0.7 MG/DL (ref 0–1.2)
BUN SERPL-MCNC: 15 MG/DL (ref 8–23)
BUN/CREAT SERPL: 16.9 (ref 7–25)
C GATTII+NEOFOR DNA CSF QL NAA+NON-PROBE: NOT DETECTED
C PNEUM DNA NPH QL NAA+NON-PROBE: NOT DETECTED
CALCIUM SPEC-SCNC: 9 MG/DL (ref 8.6–10.5)
CHLORIDE SERPL-SCNC: 96 MMOL/L (ref 98–107)
CMV DNA CSF QL NAA+PROBE: NOT DETECTED
CO2 SERPL-SCNC: 19.8 MMOL/L (ref 22–29)
COLOR CSF: COLORLESS
CREAT SERPL-MCNC: 0.89 MG/DL (ref 0.57–1)
D-LACTATE SERPL-SCNC: 1 MMOL/L (ref 0.5–2)
D-LACTATE SERPL-SCNC: 2.9 MMOL/L (ref 0.5–2)
DEPRECATED RDW RBC AUTO: 43.8 FL (ref 37–54)
E COLI K1 DNA CSF QL NAA+NON-PROBE: NOT DETECTED
EGFRCR SERPLBLD CKD-EPI 2021: 71.2 ML/MIN/1.73
ERYTHROCYTE [DISTWIDTH] IN BLOOD BY AUTOMATED COUNT: 13.2 % (ref 12.3–15.4)
EV RNA CSF QL NAA+PROBE: NOT DETECTED
FLUAV SUBTYP SPEC NAA+PROBE: NOT DETECTED
FLUBV RNA ISLT QL NAA+PROBE: NOT DETECTED
GLOBULIN UR ELPH-MCNC: 2.6 GM/DL
GLUCOSE CSF-MCNC: 84 MG/DL (ref 40–70)
GLUCOSE SERPL-MCNC: 148 MG/DL (ref 65–99)
GP B STREP DNA SPEC QL NAA+PROBE: NOT DETECTED
HADV DNA SPEC NAA+PROBE: NOT DETECTED
HAEM INFLU SEROTYP DNA SPEC NAA+PROBE: NOT DETECTED
HCOV 229E RNA SPEC QL NAA+PROBE: NOT DETECTED
HCOV HKU1 RNA SPEC QL NAA+PROBE: NOT DETECTED
HCOV NL63 RNA SPEC QL NAA+PROBE: NOT DETECTED
HCOV OC43 RNA SPEC QL NAA+PROBE: NOT DETECTED
HCT VFR BLD AUTO: 38.6 % (ref 34–46.6)
HGB BLD-MCNC: 13.4 G/DL (ref 12–15.9)
HHV6 DNA CSF QL NAA+PROBE: NOT DETECTED
HMPV RNA NPH QL NAA+NON-PROBE: NOT DETECTED
HPIV1 RNA ISLT QL NAA+PROBE: NOT DETECTED
HPIV2 RNA SPEC QL NAA+PROBE: NOT DETECTED
HPIV3 RNA NPH QL NAA+PROBE: NOT DETECTED
HPIV4 P GENE NPH QL NAA+PROBE: NOT DETECTED
HSV1 DNA CSF QL NAA+PROBE: NOT DETECTED
HSV2 DNA CSF QL NAA+PROBE: NOT DETECTED
L MONOCYTOG RRNA SPEC QL PROBE: NOT DETECTED
LYMPHOCYTES # BLD MANUAL: 1.09 10*3/MM3 (ref 0.7–3.1)
LYMPHOCYTES NFR BLD MANUAL: 3.1 % (ref 5–12)
LYMPHOCYTES NFR CSF MANUAL: 50 %
M PNEUMO IGG SER IA-ACNC: NOT DETECTED
MCH RBC QN AUTO: 32 PG (ref 26.6–33)
MCHC RBC AUTO-ENTMCNC: 34.7 G/DL (ref 31.5–35.7)
MCV RBC AUTO: 92.1 FL (ref 79–97)
METHOD: ABNORMAL
MONOCYTES # BLD: 0.41 10*3/MM3 (ref 0.1–0.9)
MONOCYTES NFR CSF MANUAL: 9 %
N MEN DNA SPEC QL NAA+PROBE: NOT DETECTED
NEUTROPHILS # BLD AUTO: 11.82 10*3/MM3 (ref 1.7–7)
NEUTROPHILS NFR BLD MANUAL: 88.8 % (ref 42.7–76)
NEUTROPHILS NFR CSF MICRO: 41 %
NRBC BLD AUTO-RTO: 0 /100 WBC (ref 0–0.2)
NUC CELL # CSF MANUAL: 31 /MM3 (ref 0–5)
PARECHOVIRUS A RNA CSF QL NAA+NON-PROBE: NOT DETECTED
PLAT MORPH BLD: NORMAL
PLATELET # BLD AUTO: 175 10*3/MM3 (ref 140–450)
PMV BLD AUTO: 11.9 FL (ref 6–12)
POTASSIUM SERPL-SCNC: 4.1 MMOL/L (ref 3.5–5.2)
PROT CSF-MCNC: 73.5 MG/DL (ref 15–45)
PROT SERPL-MCNC: 7.2 G/DL (ref 6–8.5)
QT INTERVAL: 341 MS
QTC INTERVAL: 453 MS
RBC # BLD AUTO: 4.19 10*6/MM3 (ref 3.77–5.28)
RBC # CSF MANUAL: 1 /MM3 (ref 0–0)
RBC MORPH BLD: NORMAL
RHINOVIRUS RNA SPEC NAA+PROBE: NOT DETECTED
RSV RNA NPH QL NAA+NON-PROBE: NOT DETECTED
S PNEUM DNA CSF QL NAA+NON-PROBE: NOT DETECTED
SARS-COV-2 RNA NPH QL NAA+NON-PROBE: NOT DETECTED
SODIUM SERPL-SCNC: 131 MMOL/L (ref 136–145)
TUBE # CSF: 3
VARIANT LYMPHS NFR BLD MANUAL: 8.2 % (ref 19.6–45.3)
VZV DNA CSF QL NAA+PROBE: NOT DETECTED
WBC MORPH BLD: NORMAL
WBC NRBC COR # BLD: 13.31 10*3/MM3 (ref 3.4–10.8)

## 2023-09-13 PROCEDURE — 89051 BODY FLUID CELL COUNT: CPT | Performed by: STUDENT IN AN ORGANIZED HEALTH CARE EDUCATION/TRAINING PROGRAM

## 2023-09-13 PROCEDURE — 87205 SMEAR GRAM STAIN: CPT | Performed by: STUDENT IN AN ORGANIZED HEALTH CARE EDUCATION/TRAINING PROGRAM

## 2023-09-13 PROCEDURE — 80053 COMPREHEN METABOLIC PANEL: CPT | Performed by: STUDENT IN AN ORGANIZED HEALTH CARE EDUCATION/TRAINING PROGRAM

## 2023-09-13 PROCEDURE — 0202U NFCT DS 22 TRGT SARS-COV-2: CPT | Performed by: STUDENT IN AN ORGANIZED HEALTH CARE EDUCATION/TRAINING PROGRAM

## 2023-09-13 PROCEDURE — 86788 WEST NILE VIRUS AB IGM: CPT | Performed by: INTERNAL MEDICINE

## 2023-09-13 PROCEDURE — 93010 ELECTROCARDIOGRAM REPORT: CPT | Performed by: INTERNAL MEDICINE

## 2023-09-13 PROCEDURE — 99213 OFFICE O/P EST LOW 20 MIN: CPT | Performed by: NURSE PRACTITIONER

## 2023-09-13 PROCEDURE — 99285 EMERGENCY DEPT VISIT HI MDM: CPT

## 2023-09-13 PROCEDURE — 71045 X-RAY EXAM CHEST 1 VIEW: CPT

## 2023-09-13 PROCEDURE — 25010000002 KETOROLAC TROMETHAMINE PER 15 MG: Performed by: STUDENT IN AN ORGANIZED HEALTH CARE EDUCATION/TRAINING PROGRAM

## 2023-09-13 PROCEDURE — 25010000002 AMPICILLIN PER 500 MG: Performed by: STUDENT IN AN ORGANIZED HEALTH CARE EDUCATION/TRAINING PROGRAM

## 2023-09-13 PROCEDURE — 25010000002 VANCOMYCIN 10 G RECONSTITUTED SOLUTION: Performed by: STUDENT IN AN ORGANIZED HEALTH CARE EDUCATION/TRAINING PROGRAM

## 2023-09-13 PROCEDURE — 85025 COMPLETE CBC W/AUTO DIFF WBC: CPT | Performed by: STUDENT IN AN ORGANIZED HEALTH CARE EDUCATION/TRAINING PROGRAM

## 2023-09-13 PROCEDURE — 25010000002 ONDANSETRON PER 1 MG: Performed by: STUDENT IN AN ORGANIZED HEALTH CARE EDUCATION/TRAINING PROGRAM

## 2023-09-13 PROCEDURE — 87070 CULTURE OTHR SPECIMN AEROBIC: CPT | Performed by: STUDENT IN AN ORGANIZED HEALTH CARE EDUCATION/TRAINING PROGRAM

## 2023-09-13 PROCEDURE — 009U3ZX DRAINAGE OF SPINAL CANAL, PERCUTANEOUS APPROACH, DIAGNOSTIC: ICD-10-PCS | Performed by: STUDENT IN AN ORGANIZED HEALTH CARE EDUCATION/TRAINING PROGRAM

## 2023-09-13 PROCEDURE — 25010000002 CEFTRIAXONE PER 250 MG: Performed by: STUDENT IN AN ORGANIZED HEALTH CARE EDUCATION/TRAINING PROGRAM

## 2023-09-13 PROCEDURE — 85007 BL SMEAR W/DIFF WBC COUNT: CPT | Performed by: STUDENT IN AN ORGANIZED HEALTH CARE EDUCATION/TRAINING PROGRAM

## 2023-09-13 PROCEDURE — 70450 CT HEAD/BRAIN W/O DYE: CPT

## 2023-09-13 PROCEDURE — 83605 ASSAY OF LACTIC ACID: CPT | Performed by: STUDENT IN AN ORGANIZED HEALTH CARE EDUCATION/TRAINING PROGRAM

## 2023-09-13 PROCEDURE — 93005 ELECTROCARDIOGRAM TRACING: CPT | Performed by: STUDENT IN AN ORGANIZED HEALTH CARE EDUCATION/TRAINING PROGRAM

## 2023-09-13 PROCEDURE — 82945 GLUCOSE OTHER FLUID: CPT | Performed by: STUDENT IN AN ORGANIZED HEALTH CARE EDUCATION/TRAINING PROGRAM

## 2023-09-13 PROCEDURE — 86789 WEST NILE VIRUS ANTIBODY: CPT | Performed by: INTERNAL MEDICINE

## 2023-09-13 PROCEDURE — 87483 CNS DNA AMP PROBE TYPE 12-25: CPT | Performed by: STUDENT IN AN ORGANIZED HEALTH CARE EDUCATION/TRAINING PROGRAM

## 2023-09-13 PROCEDURE — 36415 COLL VENOUS BLD VENIPUNCTURE: CPT

## 2023-09-13 PROCEDURE — 87015 SPECIMEN INFECT AGNT CONCNTJ: CPT | Performed by: STUDENT IN AN ORGANIZED HEALTH CARE EDUCATION/TRAINING PROGRAM

## 2023-09-13 PROCEDURE — 87040 BLOOD CULTURE FOR BACTERIA: CPT | Performed by: STUDENT IN AN ORGANIZED HEALTH CARE EDUCATION/TRAINING PROGRAM

## 2023-09-13 PROCEDURE — 84157 ASSAY OF PROTEIN OTHER: CPT | Performed by: STUDENT IN AN ORGANIZED HEALTH CARE EDUCATION/TRAINING PROGRAM

## 2023-09-13 RX ORDER — ONDANSETRON 2 MG/ML
4 INJECTION INTRAMUSCULAR; INTRAVENOUS ONCE
Status: COMPLETED | OUTPATIENT
Start: 2023-09-13 | End: 2023-09-13

## 2023-09-13 RX ORDER — AMOXICILLIN 250 MG
2 CAPSULE ORAL 2 TIMES DAILY
Status: DISCONTINUED | OUTPATIENT
Start: 2023-09-13 | End: 2023-09-16 | Stop reason: HOSPADM

## 2023-09-13 RX ORDER — SODIUM CHLORIDE 9 MG/ML
75 INJECTION, SOLUTION INTRAVENOUS CONTINUOUS
Status: DISCONTINUED | OUTPATIENT
Start: 2023-09-13 | End: 2023-09-16 | Stop reason: HOSPADM

## 2023-09-13 RX ORDER — LOSARTAN POTASSIUM 100 MG/1
100 TABLET ORAL
Status: DISCONTINUED | OUTPATIENT
Start: 2023-09-14 | End: 2023-09-16 | Stop reason: HOSPADM

## 2023-09-13 RX ORDER — ACETAMINOPHEN 325 MG/1
650 TABLET ORAL EVERY 4 HOURS PRN
Status: DISCONTINUED | OUTPATIENT
Start: 2023-09-13 | End: 2023-09-16 | Stop reason: HOSPADM

## 2023-09-13 RX ORDER — LIDOCAINE HYDROCHLORIDE 10 MG/ML
10 INJECTION, SOLUTION INFILTRATION; PERINEURAL ONCE
Status: DISCONTINUED | OUTPATIENT
Start: 2023-09-13 | End: 2023-09-13

## 2023-09-13 RX ORDER — POLYETHYLENE GLYCOL 3350 17 G/17G
17 POWDER, FOR SOLUTION ORAL DAILY PRN
Status: DISCONTINUED | OUTPATIENT
Start: 2023-09-13 | End: 2023-09-16 | Stop reason: HOSPADM

## 2023-09-13 RX ORDER — ASPIRIN 81 MG/1
81 TABLET ORAL DAILY
Status: DISCONTINUED | OUTPATIENT
Start: 2023-09-14 | End: 2023-09-16 | Stop reason: HOSPADM

## 2023-09-13 RX ORDER — BUDESONIDE AND FORMOTEROL FUMARATE DIHYDRATE 160; 4.5 UG/1; UG/1
2 AEROSOL RESPIRATORY (INHALATION)
Status: DISCONTINUED | OUTPATIENT
Start: 2023-09-13 | End: 2023-09-16 | Stop reason: HOSPADM

## 2023-09-13 RX ORDER — BISACODYL 10 MG
10 SUPPOSITORY, RECTAL RECTAL DAILY PRN
Status: DISCONTINUED | OUTPATIENT
Start: 2023-09-13 | End: 2023-09-16 | Stop reason: HOSPADM

## 2023-09-13 RX ORDER — ACETAMINOPHEN 160 MG/5ML
650 SOLUTION ORAL EVERY 4 HOURS PRN
Status: DISCONTINUED | OUTPATIENT
Start: 2023-09-13 | End: 2023-09-16 | Stop reason: HOSPADM

## 2023-09-13 RX ORDER — BISACODYL 5 MG/1
5 TABLET, DELAYED RELEASE ORAL DAILY PRN
Status: DISCONTINUED | OUTPATIENT
Start: 2023-09-13 | End: 2023-09-16 | Stop reason: HOSPADM

## 2023-09-13 RX ORDER — KETOROLAC TROMETHAMINE 15 MG/ML
15 INJECTION, SOLUTION INTRAMUSCULAR; INTRAVENOUS ONCE
Status: COMPLETED | OUTPATIENT
Start: 2023-09-13 | End: 2023-09-13

## 2023-09-13 RX ORDER — ONDANSETRON 2 MG/ML
4 INJECTION INTRAMUSCULAR; INTRAVENOUS EVERY 6 HOURS PRN
Status: DISCONTINUED | OUTPATIENT
Start: 2023-09-13 | End: 2023-09-16 | Stop reason: HOSPADM

## 2023-09-13 RX ORDER — ALBUTEROL SULFATE 2.5 MG/3ML
2.5 SOLUTION RESPIRATORY (INHALATION) EVERY 6 HOURS PRN
Status: DISCONTINUED | OUTPATIENT
Start: 2023-09-13 | End: 2023-09-16 | Stop reason: HOSPADM

## 2023-09-13 RX ORDER — ACETAMINOPHEN 500 MG
1000 TABLET ORAL ONCE
Status: COMPLETED | OUTPATIENT
Start: 2023-09-13 | End: 2023-09-13

## 2023-09-13 RX ORDER — ACETAMINOPHEN 650 MG/1
650 SUPPOSITORY RECTAL EVERY 4 HOURS PRN
Status: DISCONTINUED | OUTPATIENT
Start: 2023-09-13 | End: 2023-09-16 | Stop reason: HOSPADM

## 2023-09-13 RX ORDER — ATORVASTATIN CALCIUM 20 MG/1
20 TABLET, FILM COATED ORAL NIGHTLY
Status: DISCONTINUED | OUTPATIENT
Start: 2023-09-13 | End: 2023-09-16 | Stop reason: HOSPADM

## 2023-09-13 RX ORDER — LIDOCAINE HYDROCHLORIDE AND EPINEPHRINE 10; 10 MG/ML; UG/ML
10 INJECTION, SOLUTION INFILTRATION; PERINEURAL ONCE
Status: COMPLETED | OUTPATIENT
Start: 2023-09-13 | End: 2023-09-13

## 2023-09-13 RX ADMIN — AMPICILLIN 2000 MG: 2 INJECTION, POWDER, FOR SOLUTION INTRAVENOUS at 22:13

## 2023-09-13 RX ADMIN — KETOROLAC TROMETHAMINE 15 MG: 15 INJECTION, SOLUTION INTRAMUSCULAR; INTRAVENOUS at 17:04

## 2023-09-13 RX ADMIN — SODIUM CHLORIDE, POTASSIUM CHLORIDE, SODIUM LACTATE AND CALCIUM CHLORIDE 1000 ML: 600; 310; 30; 20 INJECTION, SOLUTION INTRAVENOUS at 16:34

## 2023-09-13 RX ADMIN — VANCOMYCIN HYDROCHLORIDE 1250 MG: 10 INJECTION, POWDER, LYOPHILIZED, FOR SOLUTION INTRAVENOUS at 22:34

## 2023-09-13 RX ADMIN — CEFTRIAXONE 2000 MG: 2 INJECTION, POWDER, FOR SOLUTION INTRAMUSCULAR; INTRAVENOUS at 21:39

## 2023-09-13 RX ADMIN — ONDANSETRON 4 MG: 2 INJECTION INTRAMUSCULAR; INTRAVENOUS at 18:08

## 2023-09-13 RX ADMIN — LIDOCAINE HYDROCHLORIDE,EPINEPHRINE BITARTRATE 10 ML: 10; .01 INJECTION, SOLUTION INFILTRATION; PERINEURAL at 18:40

## 2023-09-13 RX ADMIN — ACETAMINOPHEN 1000 MG: 500 TABLET ORAL at 17:04

## 2023-09-13 NOTE — TELEPHONE ENCOUNTER
Caller: Zaragoza Arline L    Relationship: Self    Best call back number: [phone]     What are your current symptoms: HEADACHE, DIZZINESS, NAUSEA, LACK OF APPETITE    How long have you been experiencing symptoms: 3-4 DAYS    Have you had these symptoms before:    [] Yes  [x] No    Have you been treated for these symptoms before:   [] Yes  [x] No    If a prescription is needed, what is your preferred pharmacy and phone number: [facility] [id number] - [place] [address] [address] AT [facility] [address] ([facility] [phone] [facility] [phone] FX     Additional notes: PATIENT STATES THAT SHE IS NOT ABLE TO DRIVE DUE TO THE DIZZINESS. REQUESTS MEDICATION TO TREAT SYMPTOMS. PLEASE CALL AND ADVISE

## 2023-09-13 NOTE — PROGRESS NOTES
"           Name: Arline Zaragoza  :  1955    Call Complaint/Concern:          Arline Zaragoza is a 68 y.o. female patient of Allie Farah APRN who has called for:     Attempted Doximity: unable to connect.   Called patient:   Patient at home.     N/V/D, HA - 3 days.   She state she has Fever/chills.  No cough.   States she is weak and now when she gets up she gets dizzy.   She is drinking fluids. No abd pain.  No CP.  No change in vision.       She was scheduled for video visit: based on symptoms, advise to go to ER for evaluation  and concern for dehydration. Advised to continue fluids.     States she will call family that is at work to take her to ER.   If symptoms worsen, SOA or CP - call 911.       Patient instructed to follow up with PCP if they have further issues or concerns.  If problem worsens over the night or weekend, they should seek urgent care.     Ayden \"Myles\" IGNACIO Madsen   23    Objective:          Current Outpatient Medications:     albuterol sulfate  (90 Base) MCG/ACT inhaler, Inhale 2 puffs Every 4 (Four) Hours As Needed for Wheezing., Disp: , Rfl:     aspirin 81 MG EC tablet, Take 1 tablet by mouth Daily., Disp: , Rfl:     atorvastatin (LIPITOR) 20 MG tablet, Take 1 tablet by mouth Every Night., Disp: 90 tablet, Rfl: 3    budesonide-formoterol (Symbicort) 160-4.5 MCG/ACT inhaler, Inhale 2 puffs 2 (Two) Times a Day., Disp: 10.2 g, Rfl: 12    Calcium Carbonate (CALCIUM 500 PO), Take 1 tablet by mouth Daily., Disp: , Rfl:     irbesartan (Avapro) 300 MG tablet, Take 1 tablet by mouth Daily., Disp: 90 tablet, Rfl: 1    raloxifene (EVISTA) 60 MG tablet, Take 1 tablet by mouth Daily., Disp: 30 tablet, Rfl: 5    Office Visit on 2023   Component Date Value Ref Range Status    LDL Cholesterol  2023 57  0 - 100 mg/dL Final    Comment: LDL Reference Ranges  (U.S. Department of Health and Human Services ATP III  Classifications)  Optimal          <100 mg/dl  Near Optimal    "  100-129 mg/dl  Borderline High  130-159 mg/dl  High             160-189 mg/dl  Very High        >189 mg/dl      TSH 06/28/2023 1.920  0.270 - 4.200 uIU/mL Final    Free T4 06/28/2023 1.33  0.93 - 1.70 ng/dL Final    Results may be falsely increased if patient taking Biotin.        A/P: Nausea, Dizziness:     Problem List Items Addressed This Visit    None  Visit Diagnoses       Nausea    -  Primary    Nausea and vomiting, unspecified vomiting type        Dizziness                 Unable to assess virtually. Based on symptoms: referred to ER

## 2023-09-14 PROBLEM — E87.1 HYPONATREMIA: Status: ACTIVE | Noted: 2023-09-14

## 2023-09-14 LAB
ANION GAP SERPL CALCULATED.3IONS-SCNC: 11.4 MMOL/L (ref 5–15)
BILIRUB UR QL STRIP: NEGATIVE
BUN SERPL-MCNC: 17 MG/DL (ref 8–23)
BUN/CREAT SERPL: 23.3 (ref 7–25)
CALCIUM SPEC-SCNC: 8.4 MG/DL (ref 8.6–10.5)
CHLORIDE SERPL-SCNC: 102 MMOL/L (ref 98–107)
CLARITY UR: CLEAR
CO2 SERPL-SCNC: 21.6 MMOL/L (ref 22–29)
COLOR UR: YELLOW
CREAT SERPL-MCNC: 0.73 MG/DL (ref 0.57–1)
DEPRECATED RDW RBC AUTO: 43 FL (ref 37–54)
EGFRCR SERPLBLD CKD-EPI 2021: 90.3 ML/MIN/1.73
ERYTHROCYTE [DISTWIDTH] IN BLOOD BY AUTOMATED COUNT: 13 % (ref 12.3–15.4)
GIANT PLATELETS: ABNORMAL
GLUCOSE SERPL-MCNC: 106 MG/DL (ref 65–99)
GLUCOSE UR STRIP-MCNC: NEGATIVE MG/DL
HCT VFR BLD AUTO: 33.1 % (ref 34–46.6)
HGB BLD-MCNC: 11.3 G/DL (ref 12–15.9)
HGB UR QL STRIP.AUTO: NEGATIVE
KETONES UR QL STRIP: ABNORMAL
LEUKOCYTE ESTERASE UR QL STRIP.AUTO: NEGATIVE
LYMPHOCYTES # BLD MANUAL: 1.07 10*3/MM3 (ref 0.7–3.1)
LYMPHOCYTES NFR BLD MANUAL: 10.2 % (ref 5–12)
MCH RBC QN AUTO: 31.3 PG (ref 26.6–33)
MCHC RBC AUTO-ENTMCNC: 34.1 G/DL (ref 31.5–35.7)
MCV RBC AUTO: 91.7 FL (ref 79–97)
MONOCYTES # BLD: 1.34 10*3/MM3 (ref 0.1–0.9)
NEUTROPHILS # BLD AUTO: 10.68 10*3/MM3 (ref 1.7–7)
NEUTROPHILS NFR BLD MANUAL: 81.6 % (ref 42.7–76)
NITRITE UR QL STRIP: NEGATIVE
PH UR STRIP.AUTO: 5.5 [PH] (ref 5–8)
PLATELET # BLD AUTO: 144 10*3/MM3 (ref 140–450)
PMV BLD AUTO: 11.4 FL (ref 6–12)
POLYCHROMASIA BLD QL SMEAR: ABNORMAL
POTASSIUM SERPL-SCNC: 3.7 MMOL/L (ref 3.5–5.2)
PROT UR QL STRIP: ABNORMAL
RBC # BLD AUTO: 3.61 10*6/MM3 (ref 3.77–5.28)
SODIUM SERPL-SCNC: 135 MMOL/L (ref 136–145)
SP GR UR STRIP: >=1.03 (ref 1–1.03)
UROBILINOGEN UR QL STRIP: ABNORMAL
VARIANT LYMPHS NFR BLD MANUAL: 8.2 % (ref 19.6–45.3)
WBC MORPH BLD: NORMAL
WBC NRBC COR # BLD: 13.09 10*3/MM3 (ref 3.4–10.8)

## 2023-09-14 PROCEDURE — 25010000002 CEFTRIAXONE PER 250 MG: Performed by: INTERNAL MEDICINE

## 2023-09-14 PROCEDURE — 94799 UNLISTED PULMONARY SVC/PX: CPT

## 2023-09-14 PROCEDURE — 85025 COMPLETE CBC W/AUTO DIFF WBC: CPT | Performed by: INTERNAL MEDICINE

## 2023-09-14 PROCEDURE — 36415 COLL VENOUS BLD VENIPUNCTURE: CPT | Performed by: INTERNAL MEDICINE

## 2023-09-14 PROCEDURE — 25010000002 VANCOMYCIN PER 500 MG: Performed by: INTERNAL MEDICINE

## 2023-09-14 PROCEDURE — 25010000002 AMPICILLIN PER 500 MG: Performed by: INTERNAL MEDICINE

## 2023-09-14 PROCEDURE — 85007 BL SMEAR W/DIFF WBC COUNT: CPT | Performed by: INTERNAL MEDICINE

## 2023-09-14 PROCEDURE — 81003 URINALYSIS AUTO W/O SCOPE: CPT | Performed by: STUDENT IN AN ORGANIZED HEALTH CARE EDUCATION/TRAINING PROGRAM

## 2023-09-14 PROCEDURE — 94761 N-INVAS EAR/PLS OXIMETRY MLT: CPT

## 2023-09-14 PROCEDURE — 94640 AIRWAY INHALATION TREATMENT: CPT

## 2023-09-14 PROCEDURE — 80048 BASIC METABOLIC PNL TOTAL CA: CPT | Performed by: INTERNAL MEDICINE

## 2023-09-14 PROCEDURE — 94664 DEMO&/EVAL PT USE INHALER: CPT

## 2023-09-14 PROCEDURE — 99223 1ST HOSP IP/OBS HIGH 75: CPT | Performed by: INTERNAL MEDICINE

## 2023-09-14 RX ORDER — VANCOMYCIN HYDROCHLORIDE 1 G/200ML
1000 INJECTION, SOLUTION INTRAVENOUS
Status: DISCONTINUED | OUTPATIENT
Start: 2023-09-14 | End: 2023-09-15 | Stop reason: DRUGHIGH

## 2023-09-14 RX ADMIN — LOSARTAN POTASSIUM 100 MG: 100 TABLET, FILM COATED ORAL at 08:56

## 2023-09-14 RX ADMIN — SENNOSIDES AND DOCUSATE SODIUM 2 TABLET: 50; 8.6 TABLET ORAL at 08:56

## 2023-09-14 RX ADMIN — AMPICILLIN SODIUM 2 G: 2 INJECTION, POWDER, FOR SOLUTION INTRAMUSCULAR; INTRAVENOUS at 02:21

## 2023-09-14 RX ADMIN — ATORVASTATIN CALCIUM 20 MG: 20 TABLET, FILM COATED ORAL at 02:25

## 2023-09-14 RX ADMIN — BUDESONIDE AND FORMOTEROL FUMARATE DIHYDRATE 2 PUFF: 160; 4.5 AEROSOL RESPIRATORY (INHALATION) at 19:50

## 2023-09-14 RX ADMIN — BUDESONIDE AND FORMOTEROL FUMARATE DIHYDRATE 2 PUFF: 160; 4.5 AEROSOL RESPIRATORY (INHALATION) at 07:36

## 2023-09-14 RX ADMIN — ATORVASTATIN CALCIUM 20 MG: 20 TABLET, FILM COATED ORAL at 20:03

## 2023-09-14 RX ADMIN — CEFTRIAXONE 2000 MG: 2 INJECTION, POWDER, FOR SOLUTION INTRAMUSCULAR; INTRAVENOUS at 21:27

## 2023-09-14 RX ADMIN — ACETAMINOPHEN 650 MG: 325 TABLET, FILM COATED ORAL at 14:52

## 2023-09-14 RX ADMIN — ACETAMINOPHEN 650 MG: 325 TABLET, FILM COATED ORAL at 02:25

## 2023-09-14 RX ADMIN — ASPIRIN 81 MG: 81 TABLET, COATED ORAL at 08:56

## 2023-09-14 RX ADMIN — AMPICILLIN SODIUM 2 G: 2 INJECTION, POWDER, FOR SOLUTION INTRAMUSCULAR; INTRAVENOUS at 07:00

## 2023-09-14 RX ADMIN — SENNOSIDES AND DOCUSATE SODIUM 2 TABLET: 50; 8.6 TABLET ORAL at 02:25

## 2023-09-14 RX ADMIN — ACETAMINOPHEN 650 MG: 325 TABLET, FILM COATED ORAL at 21:27

## 2023-09-14 RX ADMIN — VANCOMYCIN HYDROCHLORIDE 1000 MG: 1 INJECTION, SOLUTION INTRAVENOUS at 12:42

## 2023-09-14 RX ADMIN — CEFTRIAXONE 2000 MG: 2 INJECTION, POWDER, FOR SOLUTION INTRAMUSCULAR; INTRAVENOUS at 08:57

## 2023-09-14 RX ADMIN — SODIUM CHLORIDE 75 ML/HR: 9 INJECTION, SOLUTION INTRAVENOUS at 02:22

## 2023-09-14 NOTE — ED NOTES
Nursing report ED to floor  Arline Zaragoza  68 y.o.  female    HPI :   Chief Complaint   Patient presents with    Vomiting    Headache       Admitting doctor:   Megan Melton MD    Admitting diagnosis:   The primary encounter diagnosis was Fever of unknown origin. Diagnoses of Abnormal head CT and Suspected infectious meningitis were also pertinent to this visit.    Code status:   Current Code Status       Date Active Code Status Order ID Comments User Context       9/13/2023 1932 CPR (Attempt to Resuscitate) [id number]  Megan Melton MD ED        Question Answer    Code Status (Patient has no pulse and is not breathing) CPR (Attempt to Resuscitate)    Medical Interventions (Patient has pulse or is breathing) Full Support                    Allergies:   Patient has no known allergies.    Isolation:   No active isolations    Intake and Output    Intake/Output Summary (Last 24 hours) at 9/13/2023 2133  Last data filed at 9/13/2023 1713  Gross per 24 hour   Intake 1000 ml   Output --   Net 1000 ml       Weight:       09/13/23  1543   Weight: 56.7 kg (125 lb)       Most recent vitals:   Vitals:    09/13/23 1633 09/13/23 1702 09/13/23 1732 09/13/23 1812   BP: 144/92 164/89 127/68    BP Location:       Patient Position:       Pulse: 106 102 105    Resp:       Temp:    100.3 °F (37.9 °C)   TempSrc:    Tympanic   SpO2: 99% 99% 96%    Weight:       Height:           Active LDAs/IV Access:   Lines, Drains & Airways       Active LDAs       Name Placement date Placement time Site Days    Peripheral IV 10/30/22 1100 Anterior;Proximal;Right Antecubital 10/30/22  1100  Antecubital  318    Peripheral IV 09/13/23 1550 Right Antecubital 09/13/23  1550  Antecubital  less than 1                    Labs (abnormal labs have a star):   Labs Reviewed   COMPREHENSIVE METABOLIC PANEL - Abnormal; Notable for the following components:       Result Value    Glucose 148 (*)     Sodium 131 (*)     Chloride 96 (*)     CO2 19.8 (*)      Anion Gap 15.2 (*)     All other components within normal limits    Narrative:     GFR Normal >60  Chronic Kidney Disease <60  Kidney Failure <15     CBC WITH AUTO DIFFERENTIAL - Abnormal; Notable for the following components:    WBC 13.31 (*)     All other components within normal limits   MANUAL DIFFERENTIAL - Abnormal; Notable for the following components:    Neutrophil % 88.8 (*)     Lymphocyte % 8.2 (*)     Monocyte % 3.1 (*)     Neutrophils Absolute 11.82 (*)     All other components within normal limits   LACTIC ACID, PLASMA - Abnormal; Notable for the following components:    Lactate 2.9 (*)     All other components within normal limits   PROTEIN, CSF - Abnormal; Notable for the following components:    Protein, Total (CSF) 73.5 (*)     All other components within normal limits   GLUCOSE, CSF - Abnormal; Notable for the following components:    Glucose, CSF 84 (*)     All other components within normal limits   CELL COUNT CSF - Abnormal; Notable for the following components:    RBC, CSF 1 (*)     Nucleated Cells, CSF 31 (*)     All other components within normal limits    Narrative:     This test was developed, its performance characteristics determined and judged suitable for clinical purposes by [facility]. It has not been cleared or approved by the FDA. The laboratory is regulated under CLIA as qualified to perform high-complexity testing.   RESPIRATORY PANEL PCR W/ COVID-19 (SARS-COV-2) MIKE/ISHA/MARIA LUZ/PAD/COR/MAD/EMILIA IN-HOUSE, NP SWAB IN [facility], 3-4 HR TAT - Normal    Narrative:     In the setting of a positive respiratory panel with a viral infection PLUS a negative procalcitonin without other underlying concern for bacterial infection, consider observing off antibiotics or discontinuation of antibiotics and continue supportive care. If the respiratory panel is positive for atypical bacterial infection (Bordetella pertussis, Chlamydophila pneumoniae, or Mycoplasma pneumoniae),  consider antibiotic de-escalation to target atypical bacterial infection.   CULTURE, CSF   BLOOD CULTURE   BLOOD CULTURE   MENINGITIS / ENCEPHALITIS PANEL, PCR   SPINAL FLUID DIFFERENTIAL   LACTIC ACID, REFLEX   URINALYSIS W/ CULTURE IF INDICATED   CBC AND DIFFERENTIAL    Narrative:     The following orders were created for panel order CBC & Differential.  Procedure                               Abnormality         Status                     ---------                               -----------         ------                     CBC Auto Differential[[id number]]        Abnormal            Final result                 Please view results for these tests on the individual orders.   CELL COUNT WITH DIFFERENTIAL, CSF    Narrative:     The following orders were created for panel order Cell Count With Differential, CSF Use CSF Tube: 4.  Procedure                               Abnormality         Status                     ---------                               -----------         ------                     Cell Count, CSF - Cerebr...[[id number]]  Abnormal            Final result               Spinal fluid differentia...[[id number]]                      Final result                 Please view results for these tests on the individual orders.       EKG:   ECG 12 Lead Tachycardia   Final Result   HEART RATE= 106  bpm   RR Interval= 566  ms   GA Interval= 148  ms   P Horizontal Axis= 21  deg   P Front Axis= 81  deg   QRSD Interval= 103  ms   QT Interval= 341  ms   QTcB= 453  ms   QRS Axis= 76  deg   T Wave Axis= 61  deg   - BORDERLINE ECG -   Sinus tachycardia - new   Prominent P waves, nondiagnostic   Electronically Signed By: Jennifer Briones ([facility]) 13-Sep-2023 18:21:19   Date and Time of Study: 2023-09-13 16:25:14          Meds given in ED:   Medications   cefTRIAXone (ROCEPHIN) 2,000 mg in sodium chloride 0.9 % 100 mL IVPB-VTB (has no administration in time range)   ampicillin 2000 mg IVPB in 100 ml NS (VTB) (has no  administration in time range)   lidocaine 1% - EPINEPHrine 1:469268 (XYLOCAINE W/EPI) 1 %-1:235032 injection 10 mL (has no administration in time range)   vancomycin 1250 mg/250 mL 0.9% NS IVPB ([facility]) (has no administration in time range)   acetaminophen (TYLENOL) tablet 650 mg (has no administration in time range)     Or   acetaminophen (TYLENOL) 160 MG/5ML oral solution 650 mg (has no administration in time range)     Or   acetaminophen (TYLENOL) suppository 650 mg (has no administration in time range)   sennosides-docusate (PERICOLACE) 8.6-50 MG per tablet 2 tablet (has no administration in time range)     And   polyethylene glycol (MIRALAX) packet 17 g (has no administration in time range)     And   bisacodyl (DULCOLAX) EC tablet 5 mg (has no administration in time range)     And   bisacodyl (DULCOLAX) suppository 10 mg (has no administration in time range)   ondansetron (ZOFRAN) injection 4 mg (has no administration in time range)   lactated ringers bolus 1,000 mL (0 mL Intravenous Stopped 9/13/23 1713)   acetaminophen (TYLENOL) tablet 1,000 mg (1,000 mg Oral Given 9/13/23 1704)   ketorolac (TORADOL) injection 15 mg (15 mg Intravenous Given 9/13/23 1704)   ondansetron (ZOFRAN) injection 4 mg (4 mg Intravenous Given 9/13/23 1808)       Imaging results:  CT Head Without Contrast    Result Date: 9/13/2023   There are 2 subtle areas of increased density within the left aspect of the body of the corpus callosum that are of uncertain precise etiology and significance. However, I cannot exclude the possibility of a mass or an infectious abnormality at these sites. I strongly recommend further evaluation with an MRI of the brain with and without the use of IV contrast.  These findings and recommendations were directly discussed with Dr. Karlos Angel on 09/13/2023 at approximately 6:54 PM. .  Radiation dose reduction techniques were utilized, including automated exposure control and exposure modulation based on body size.     This report was finalized on 9/13/2023 7:21 PM by Dr. Pastor Loco M.D.      XR Chest 1 View    Result Date: 9/13/2023  Mild pulmonary emphysema. No evidence for active disease in the chest.   This report was finalized on 9/13/2023 5:40 PM by Dr. Howard Kimball M.D.       Ambulatory status:   - bed rest    Social issues:   Social History     Socioeconomic History    Marital status:    Tobacco Use    Smoking status: Every Day     Packs/day: 0.50     Years: 40.00     Pack years: 20.00     Types: Cigarettes     Start date: 1/1/1982    Smokeless tobacco: Never    Tobacco comments:     1/2 PPD   Vaping Use    Vaping Use: Never used   Substance and Sexual Activity    Alcohol use: Yes     Alcohol/week: 6.0 standard drinks     Types: 2 Glasses of wine, 4 Cans of beer per week    Drug use: Never    Sexual activity: Not Currently       NIH Stroke Scale:       Marixa Hagen RN  09/13/23 21:33 EDT

## 2023-09-14 NOTE — PLAN OF CARE
Problem: Adult Inpatient Plan of Care  Goal: Plan of Care Review  Outcome: Ongoing, Progressing  Flowsheets (Taken 9/14/2023 1932)  Plan of Care Reviewed With:   patient   family  Goal: Patient-Specific Goal (Individualized)  Outcome: Ongoing, Progressing  Goal: Absence of Hospital-Acquired Illness or Injury  Outcome: Ongoing, Progressing  Intervention: Identify and Manage Fall Risk  Recent Flowsheet Documentation  Taken 9/14/2023 1800 by Alexandr Quevedo RN  Safety Promotion/Fall Prevention:   activity supervised   assistive device/personal items within reach   clutter free environment maintained   fall prevention program maintained   gait belt   lighting adjusted   mobility aid in reach   muscle strengthening facilitated   nonskid shoes/slippers when out of bed   room organization consistent   safety round/check completed  Taken 9/14/2023 1600 by Alexandr Quevedo RN  Safety Promotion/Fall Prevention:   activity supervised   assistive device/personal items within reach   clutter free environment maintained   fall prevention program maintained   gait belt   lighting adjusted   mobility aid in reach   muscle strengthening facilitated   nonskid shoes/slippers when out of bed   room organization consistent   safety round/check completed  Taken 9/14/2023 1400 by Alexandr Quevedo RN  Safety Promotion/Fall Prevention:   activity supervised   assistive device/personal items within reach   clutter free environment maintained   fall prevention program maintained   gait belt   mobility aid in reach   lighting adjusted   muscle strengthening facilitated   nonskid shoes/slippers when out of bed   room organization consistent   safety round/check completed  Taken 9/14/2023 1200 by Alexandr Quevedo RN  Safety Promotion/Fall Prevention:   activity supervised   assistive device/personal items within reach   clutter free environment maintained   fall prevention program maintained   gait belt   lighting adjusted   mobility aid  in reach   muscle strengthening facilitated   nonskid shoes/slippers when out of bed   room organization consistent   safety round/check completed  Taken 9/14/2023 1000 by Alexandr Quevedo RN  Safety Promotion/Fall Prevention:   activity supervised   assistive device/personal items within reach   clutter free environment maintained   fall prevention program maintained   gait belt   elopement precautions   lighting adjusted   mobility aid in reach   muscle strengthening facilitated   nonskid shoes/slippers when out of bed   room organization consistent   safety round/check completed  Taken 9/14/2023 0810 by Alexandr Quevedo RN  Safety Promotion/Fall Prevention:   activity supervised   assistive device/personal items within reach   clutter free environment maintained   gait belt   lighting adjusted   elopement precautions   mobility aid in reach   muscle strengthening facilitated   nonskid shoes/slippers when out of bed   room organization consistent   safety round/check completed  Intervention: Prevent Skin Injury  Recent Flowsheet Documentation  Taken 9/14/2023 0810 by Alexandr Quevedo RN  Body Position: supine  Goal: Optimal Comfort and Wellbeing  Outcome: Ongoing, Progressing  Intervention: Provide Person-Centered Care  Recent Flowsheet Documentation  Taken 9/14/2023 0810 by Alexandr Quevedo RN  Trust Relationship/Rapport:   care explained   choices provided   questions answered   questions encouraged  Goal: Readiness for Transition of Care  Outcome: Ongoing, Progressing     Problem: Fall Injury Risk  Goal: Absence of Fall and Fall-Related Injury  Outcome: Ongoing, Progressing  Intervention: Identify and Manage Contributors  Recent Flowsheet Documentation  Taken 9/14/2023 1600 by Alexandr Quevedo RN  Medication Review/Management: medications reviewed  Taken 9/14/2023 0810 by Alexandr Quevedo RN  Medication Review/Management: medications reviewed  Self-Care Promotion: independence encouraged  Intervention:  Promote Injury-Free Environment  Recent Flowsheet Documentation  Taken 9/14/2023 1800 by Alexandr Quevedo RN  Safety Promotion/Fall Prevention:   activity supervised   assistive device/personal items within reach   clutter free environment maintained   fall prevention program maintained   gait belt   lighting adjusted   mobility aid in reach   muscle strengthening facilitated   nonskid shoes/slippers when out of bed   room organization consistent   safety round/check completed  Taken 9/14/2023 1600 by Alexandr Quevedo RN  Safety Promotion/Fall Prevention:   activity supervised   assistive device/personal items within reach   clutter free environment maintained   fall prevention program maintained   gait belt   lighting adjusted   mobility aid in reach   muscle strengthening facilitated   nonskid shoes/slippers when out of bed   room organization consistent   safety round/check completed  Taken 9/14/2023 1400 by Alexandr Quevedo RN  Safety Promotion/Fall Prevention:   activity supervised   assistive device/personal items within reach   clutter free environment maintained   fall prevention program maintained   gait belt   mobility aid in reach   lighting adjusted   muscle strengthening facilitated   nonskid shoes/slippers when out of bed   room organization consistent   safety round/check completed  Taken 9/14/2023 1200 by Alexandr Quevedo RN  Safety Promotion/Fall Prevention:   activity supervised   assistive device/personal items within reach   clutter free environment maintained   fall prevention program maintained   gait belt   lighting adjusted   mobility aid in reach   muscle strengthening facilitated   nonskid shoes/slippers when out of bed   room organization consistent   safety round/check completed  Taken 9/14/2023 1000 by Alexandr Quevedo RN  Safety Promotion/Fall Prevention:   activity supervised   assistive device/personal items within reach   clutter free environment maintained   fall prevention  program maintained   gait belt   elopement precautions   lighting adjusted   mobility aid in reach   muscle strengthening facilitated   nonskid shoes/slippers when out of bed   room organization consistent   safety round/check completed  Taken 9/14/2023 0810 by Alexandr Quevedo RN  Safety Promotion/Fall Prevention:   activity supervised   assistive device/personal items within reach   clutter free environment maintained   gait belt   lighting adjusted   elopement precautions   mobility aid in reach   muscle strengthening facilitated   nonskid shoes/slippers when out of bed   room organization consistent   safety round/check completed     Problem: Pain Acute  Goal: Acceptable Pain Control and Functional Ability  Outcome: Ongoing, Progressing  Intervention: Prevent or Manage Pain  Recent Flowsheet Documentation  Taken 9/14/2023 1600 by Alexandr Quevedo RN  Medication Review/Management: medications reviewed  Taken 9/14/2023 0810 by Alexandr Quevedo RN  Medication Review/Management: medications reviewed  Intervention: Optimize Psychosocial Wellbeing  Recent Flowsheet Documentation  Taken 9/14/2023 0810 by Alexandr Quevedo RN  Supportive Measures: active listening utilized     Problem: Adjustment to Illness (Sepsis/Septic Shock)  Goal: Optimal Coping  Outcome: Ongoing, Progressing  Intervention: Optimize Psychosocial Adjustment to Illness  Recent Flowsheet Documentation  Taken 9/14/2023 0810 by Alexandr Quevedo RN  Supportive Measures: active listening utilized  Family/Support System Care: self-care encouraged     Problem: Bleeding (Sepsis/Septic Shock)  Goal: Absence of Bleeding  Outcome: Ongoing, Progressing     Problem: Glycemic Control Impaired (Sepsis/Septic Shock)  Goal: Blood Glucose Level Within Desired Range  Outcome: Ongoing, Progressing     Problem: Infection Progression (Sepsis/Septic Shock)  Goal: Absence of Infection Signs and Symptoms  Outcome: Ongoing, Progressing     Problem: Nutrition Impaired  (Sepsis/Septic Shock)  Goal: Optimal Nutrition Intake  Outcome: Ongoing, Progressing   Goal Outcome Evaluation:  Plan of Care Reviewed With: patient, family

## 2023-09-14 NOTE — PROGRESS NOTES
Name: Arline Zaragoza ADMIT: 2023   : 1955  PCP: Allie Farah APRN    MRN: [id number] LOS: 1 days   AGE/SEX: 68 y.o. female  ROOM: [facility]   Subjective   Chief Complaint   Patient presents with    Vomiting    Headache     68-year-old with history of breast cancer, fibromuscular dysplasia, hypertension, dyslipidemia, psoriasis, osteoporosis and other medical issues.  She presents with 1 week history of fevers, chills, nausea and headache.  She came to [facility] emergency room and underwent lumbar puncture and was started on antibiotics for potential meningitis.    On abx  +fever  Symptoms a little better    ROS  No f/c  No n/v  No cp/palp  No soa/cough    Objective   Vital Signs  Temp:  [98.8 °F (37.1 °C)-101.7 °F (38.7 °C)] 99.7 °F (37.6 °C)  Heart Rate:  [] 91  Resp:  [16-18] 18  BP: (119-174)/(61-92) 120/61  SpO2:  [96 %-99 %] 99 %  on   ;   Device (Oxygen Therapy): room air  Body mass index is 20.48 kg/m².    Physical Exam  Constitutional:       General: She is not in acute distress.     Appearance: She is ill-appearing.   HENT:      Head: Normocephalic and atraumatic.   Eyes:      General: No scleral icterus.  Cardiovascular:      Rate and Rhythm: Regular rhythm.      Heart sounds: Normal heart sounds.   Pulmonary:      Effort: Pulmonary effort is normal. No respiratory distress.   Abdominal:      General: There is no distension.      Palpations: Abdomen is soft.   Musculoskeletal:      Cervical back: Neck supple.   Skin:     Coloration: Skin is pale.   Neurological:      Mental Status: She is alert.   Psychiatric:         Behavior: Behavior normal.       Results Review:       I reviewed the patient's new clinical results.  Results from last 7 days   Lab Units 23  0605 23  1633   WBC 10*3/mm3 13.09* 13.31*   HEMOGLOBIN g/dL 11.3* 13.4   PLATELETS 10*3/mm3 144 175     Results from last 7 days   Lab Units 23  0605 23  1633   SODIUM mmol/L 135* 131*    POTASSIUM mmol/L 3.7 4.1   CHLORIDE mmol/L 102 96*   CO2 mmol/L 21.6* 19.8*   BUN mg/dL 17 15   CREATININE mg/dL 0.73 0.89   GLUCOSE mg/dL 106* 148*   Estimated Creatinine Clearance: 66.9 mL/min (by C-G formula based on SCr of 0.73 mg/dL).  Results from last 7 days   Lab Units 09/13/23  1633   ALBUMIN g/dL 4.6   BILIRUBIN mg/dL 0.7   ALK PHOS U/L 73   AST (SGOT) U/L 23   ALT (SGPT) U/L 13     Results from last 7 days   Lab Units 09/14/23  0605 09/13/23  1633   CALCIUM mg/dL 8.4* 9.0   ALBUMIN g/dL  --  4.6     Results from last 7 days   Lab Units 09/13/23  2222 09/13/23  1712   LACTATE mmol/L 1.0 2.9*       Coag     HbA1C No results found for: HGBA1C  Infection     Radiology(recent) CT Head Without Contrast    Result Date: 9/13/2023   There are 2 subtle areas of increased density within the left aspect of the body of the corpus callosum that are of uncertain precise etiology and significance. However, I cannot exclude the possibility of a mass or an infectious abnormality at these sites. I strongly recommend further evaluation with an MRI of the brain with and without the use of IV contrast.  These findings and recommendations were directly discussed with Dr. Karlos Angel on 09/13/2023 at approximately 6:54 PM. .  Radiation dose reduction techniques were utilized, including automated exposure control and exposure modulation based on body size.    This report was finalized on 9/13/2023 7:21 PM by Dr. Pastor Loco M.D.      XR Chest 1 View    Result Date: 9/13/2023  Mild pulmonary emphysema. No evidence for active disease in the chest.   This report was finalized on 9/13/2023 5:40 PM by Dr. Howard Kimball M.D.     No results found for: TROPONINT, TROPONINI, BNP  No components found for: TSH;2    aspirin, 81 mg, Oral, Daily  atorvastatin, 20 mg, Oral, Nightly  budesonide-formoterol, 2 puff, Inhalation, BID - RT  cefTRIAXone, 2,000 mg, Intravenous, Q12H  losartan, 100 mg, Oral, Q24H  senna-docusate sodium, 2 tablet,  Oral, BID  vancomycin, 1,000 mg, Intravenous, Q18H      Pharmacy to dose vancomycin,   sodium chloride, 75 mL/hr, Last Rate: 75 mL/hr (09/14/23 0222)    Diet: Regular/House Diet; Texture: Regular Texture (IDDSI 7); Fluid Consistency: Thin (IDDSI 0)      Assessment & Plan      Active Hospital Problems    Diagnosis  POA    **Suspected infectious meningitis [R29.818]  Yes    Hyponatremia [E87.1]  Unknown    Primary hypertension [I10]  Yes    Malignant neoplasm of female breast [C50.919]  Yes    Osteoporosis [M81.0]  Yes    Left carotid stenosis [I65.22]  Yes    CAD (coronary artery disease) [I25.10]  Yes    Hyperlipidemia [E78.5]  Yes      Resolved Hospital Problems   No resolved problems to display.     68-year-old with history of breast cancer, fibromuscular dysplasia, hypertension, dyslipidemia, psoriasis, osteoporosis and other medical issues.  She presents with 1 week history of fevers, chills, nausea and headache.  She came to [facility] emergency room and underwent lumbar puncture and was started on antibiotics for potential meningitis.    As needed agents for symptom control.  Currently on IV antibiotics.  Thanks to infectious disease who is seen and more likely to be a septic meningitis but we will follow-up on cultures.  Agree with plan for MRI brain given abnormal CT head as well as history of cancer.  Continue agents for hypertension and dyslipidemia  Monitor sodium, improved with IVFs      DW staff  Reviewed records       Sonido Jean MD  [facility]  09/14/23  13:41 EDT

## 2023-09-14 NOTE — ED PROVIDER NOTES
EMERGENCY DEPARTMENT ENCOUNTER    Room Number:  22/22  PCP: Allie Farah APRN  History obtained from: Patient      HPI:  Chief Complaint: Headache  A complete HPI/ROS/PMH/PSH/SH/FH are unobtainable due to: Not applicable  Context: Arline Zaragoza is a 68 y.o. female who presents to the ED c/o headache.  Ongoing for the last 3 days, associated fever and chills.  Also with nausea and vomiting, intermittent dizziness.  No abdominal pain.  No urinary symptoms.  No cough, rhinorrhea, congestion.            PAST MEDICAL HISTORY  Active Ambulatory Problems     Diagnosis Date Noted    Hyperlipidemia     Cancer     CAD (coronary artery disease)     Left carotid stenosis 09/14/2016    Closed displaced fracture of fifth metatarsal bone of right foot 07/05/2018    Tobacco abuse 07/05/2018    Sprain of right ankle 07/05/2018    Closed nondisplaced fracture of anterior process of left calcaneus 05/03/2019    Pain in left foot 05/03/2019    Osteoporosis 01/20/2022    Malignant neoplasm of female breast 03/22/2022    Primary hypertension 08/15/2022    S/P carotid endarterectomy 08/15/2022    Screening mammogram for breast cancer 08/15/2022     Resolved Ambulatory Problems     Diagnosis Date Noted    Positive colorectal cancer screening using Cologuard test 06/23/2021    Exertional dyspnea 12/29/2022     Past Medical History:   Diagnosis Date    Back pain     Breast cancer 03/09/2022    Carotid artery stenosis     Cervical cancer     Colon polyp 08/20/2021    Diverticulosis     Fibromuscular dysplasia     H/O bronchitis     Hx of radiation therapy     Hypertension     Loose stools     Psoriasis          PAST SURGICAL HISTORY  Past Surgical History:   Procedure Laterality Date    BREAST BIOPSY Right 03/09/2022    US guided mammotome vaccum assisted right breast biopsy-Dr. Julián Palacios, [facility]    BREAST LUMPECTOMY      BREAST LUMPECTOMY WITH SENTINEL NODE BIOPSY Right 04/28/2022    Procedure: RIGHT BREAST WIRE LOCALIZED LUMPECTOMY  WITH SENTINEL NODE BIOPSY;  Surgeon: Allie Bah MD;  Location:  [facility];  Service: General;  Laterality: Right;    COLONOSCOPY N/A 08/20/2021    Procedure: COLONOSCOPY INTO CECUM AND TI WITH COLD BX POLYPECTOMIES;  Surgeon: Catia Kate MD;  [facility];  Service: Gastroenterology;  Laterality: N/A;  PRE: POSITIVE COLOGUARD  POST: DIVERTICULOSIS, POLYPS, HEMORRHOIDS    LA TEAEC W/PATCH GRF CAROTID VERTB SUBCLAV NECK INC Left 09/14/2016    Procedure: LT CAROTID ENDARTERECTOMY WITH INTRA OPERATIVE CAROTID DUPLEX SCAN;  Surgeon: Arnulfo Hernandez MD;  Location: [facility] MAIN OR;  Service: Vascular    SUBTOTAL HYSTERECTOMY Bilateral 09/1987    done at [facility], ovaries still in [facility]         FAMILY HISTORY  Family History   Problem Relation Age of Onset    Vision loss Mother     Skin cancer Father     Skin cancer Sister     Depression Daughter     Malig Hyperthermia Neg Hx     Breast cancer Neg Hx     Ovarian cancer Neg Hx          SOCIAL HISTORY  Social History     Socioeconomic History    Marital status:    Tobacco Use    Smoking status: Every Day     Packs/day: 0.50     Years: 40.00     Pack years: 20.00     Types: Cigarettes     Start date: 1/1/1982    Smokeless tobacco: Never    Tobacco comments:     1/2 PPD   Vaping Use    Vaping Use: Never used   Substance and Sexual Activity    Alcohol use: Yes     Alcohol/week: 6.0 standard drinks     Types: 2 Glasses of wine, 4 Cans of beer per week    Drug use: Never    Sexual activity: Not Currently         ALLERGIES  Patient has no known allergies.        REVIEW OF SYSTEMS    As per HPI      PHYSICAL EXAM  ED Triage Vitals   Temp Heart Rate Resp BP SpO2   09/13/23 1538 09/13/23 1538 09/13/23 1543 09/13/23 1543 09/13/23 1538   (!) 101.7 °F (38.7 °C) (!) 122 18 174/84 96 %      Temp src Heart Rate Source Patient Position BP Location FiO2 (%)   09/13/23 1538 09/13/23 1538 09/13/23 1543 09/13/23 1543 --   Tympanic Monitor Lying Left arm         Physical Exam  Constitutional:       General: She is not in acute distress.  HENT:      Head: Normocephalic and atraumatic.   Cardiovascular:      Rate and Rhythm: Regular rhythm. Tachycardia present.   Pulmonary:      Effort: Pulmonary effort is normal. No respiratory distress.   Abdominal:      General: There is no distension.      Palpations: Abdomen is soft.      Tenderness: There is no abdominal tenderness.   Musculoskeletal:         General: No swelling or deformity.      Cervical back: Normal range of motion.   Skin:     General: Skin is warm and dry.   Neurological:      Mental Status: She is alert. Mental status is at baseline.         Vital signs and nursing notes reviewed.          LAB RESULTS  Recent Results (from the past 24 hour(s))   ECG 12 Lead Tachycardia    Collection Time: 09/13/23  4:25 PM   Result Value Ref Range    QT Interval 341 ms    QTC Interval 453 ms   Comprehensive Metabolic Panel    Collection Time: 09/13/23  4:33 PM    Specimen: Arm, Right; Blood   Result Value Ref Range    Glucose 148 (H) 65 - 99 mg/dL    BUN 15 8 - 23 mg/dL    Creatinine 0.89 0.57 - 1.00 mg/dL    Sodium 131 (L) 136 - 145 mmol/L    Potassium 4.1 3.5 - 5.2 mmol/L    Chloride 96 (L) 98 - 107 mmol/L    CO2 19.8 (L) 22.0 - 29.0 mmol/L    Calcium 9.0 8.6 - 10.5 mg/dL    Total Protein 7.2 6.0 - 8.5 g/dL    Albumin 4.6 3.5 - 5.2 g/dL    ALT (SGPT) 13 1 - 33 U/L    AST (SGOT) 23 1 - 32 U/L    Alkaline Phosphatase 73 39 - 117 U/L    Total Bilirubin 0.7 0.0 - 1.2 mg/dL    Globulin 2.6 gm/dL    A/G Ratio 1.8 g/dL    BUN/Creatinine Ratio 16.9 7.0 - 25.0    Anion Gap 15.2 (H) 5.0 - 15.0 mmol/L    eGFR 71.2 >60.0 mL/min/1.73   Respiratory Panel PCR w/COVID-19(SARS-CoV-2) [facility] In-House, NP Swab in [facility], 3-4 HR TAT - Swab, Nasopharynx    Collection Time: 09/13/23  4:33 PM    Specimen: Nasopharynx; Swab   Result Value Ref Range    ADENOVIRUS, PCR Not Detected Not Detected    Coronavirus 229E Not  Detected Not Detected    Coronavirus HKU1 Not Detected Not Detected    Coronavirus NL63 Not Detected Not Detected    Coronavirus OC43 Not Detected Not Detected    COVID19 Not Detected Not Detected - Ref. Range    Human Metapneumovirus Not Detected Not Detected    Human Rhinovirus/Enterovirus Not Detected Not Detected    Influenza A PCR Not Detected Not Detected    Influenza B PCR Not Detected Not Detected    Parainfluenza Virus 1 Not Detected Not Detected    Parainfluenza Virus 2 Not Detected Not Detected    Parainfluenza Virus 3 Not Detected Not Detected    Parainfluenza Virus 4 Not Detected Not Detected    RSV, PCR Not Detected Not Detected    Bordetella pertussis pcr Not Detected Not Detected    Bordetella parapertussis PCR Not Detected Not Detected    Chlamydophila pneumoniae PCR Not Detected Not Detected    Mycoplasma pneumo by PCR Not Detected Not Detected   CBC Auto Differential    Collection Time: 09/13/23  4:33 PM    Specimen: Arm, Right; Blood   Result Value Ref Range    WBC 13.31 (H) 3.40 - 10.80 10*3/mm3    RBC 4.19 3.77 - 5.28 10*6/mm3    Hemoglobin 13.4 12.0 - 15.9 g/dL    Hematocrit 38.6 34.0 - 46.6 %    MCV 92.1 79.0 - 97.0 fL    MCH 32.0 26.6 - 33.0 pg    MCHC 34.7 31.5 - 35.7 g/dL    RDW 13.2 12.3 - 15.4 %    RDW-SD 43.8 37.0 - 54.0 fl    MPV 11.9 6.0 - 12.0 fL    Platelets 175 140 - 450 10*3/mm3    nRBC 0.0 0.0 - 0.2 /100 WBC   Manual Differential    Collection Time: 09/13/23  4:33 PM    Specimen: Arm, Right; Blood   Result Value Ref Range    Neutrophil % 88.8 (H) 42.7 - 76.0 %    Lymphocyte % 8.2 (L) 19.6 - 45.3 %    Monocyte % 3.1 (L) 5.0 - 12.0 %    Neutrophils Absolute 11.82 (H) 1.70 - 7.00 10*3/mm3    Lymphocytes Absolute 1.09 0.70 - 3.10 10*3/mm3    Monocytes Absolute 0.41 0.10 - 0.90 10*3/mm3    RBC Morphology Normal Normal    WBC Morphology Normal Normal    Platelet Morphology Normal Normal   Lactic Acid, Plasma    Collection Time: 09/13/23  5:12 PM    Specimen: Arm, Left; Blood   Result  Value Ref Range    Lactate 2.9 (C) 0.5 - 2.0 mmol/L       Ordered the above labs and reviewed the results.        RADIOLOGY  CT Head Without Contrast    Result Date: 9/13/2023  CT HEAD WITHOUT CONTRAST  CLINICAL HISTORY: Headache and fever.  TECHNIQUE: CT scan of the head was obtained with 3 mm axial soft tissue and 2 mm axial bone algorithm algorithm images. No intravenous contrast was administered. Sagittal and coronal reconstructions were obtained.  COMPARISON: CT angiogram of the head dated 10/30/2022.  FINDINGS:  There are subtle areas of increased density in the region of the left aspect of the mid body of the corpus callosum. These are best appreciated on axial image #29. The largest of these measures up to approximately 9 x 10 mm in greatest axial dimensions. The precise etiology and significance of these findings is uncertain although I cannot exclude the possibility of a mass or infectious abnormality at this site. I recommend further evaluation with an MRI of the brain with and without the use of IV contrast.  Otherwise, the ventricles, sulci, and cisterns are age-appropriate. The basal ganglia and thalami are unremarkable in appearance. The posterior fossa structures are within normal limits. Atherosclerotic calcifications are incidentally appreciated within the intracranial vasculature.       There are 2 subtle areas of increased density within the left aspect of the body of the corpus callosum that are of uncertain precise etiology and significance. However, I cannot exclude the possibility of a mass or an infectious abnormality at these sites. I strongly recommend further evaluation with an MRI of the brain with and without the use of IV contrast.  These findings and recommendations were directly discussed with Dr. Karlos Angel on 09/13/2023 at approximately 6:54 PM. .  Radiation dose reduction techniques were utilized, including automated exposure control and exposure modulation based on body size.     This report was finalized on 9/13/2023 7:21 PM by Dr. Pastor Loco M.D.      XR Chest 1 View    Result Date: 9/13/2023  CHEST SINGLE VIEW  HISTORY: Nausea, vomiting, headache  COMPARISON: Two-view chest 09/07/2016, chest CT 02/17/2023.  FINDINGS: Cardiomediastinal silhouette is within normal limits. Lungs appear clear and there is no evidence for pulmonary edema or pleural effusion or infiltrate. There are mild changes of pulmonary emphysema. Mild apical pleural-parenchymal scarring is present.      Mild pulmonary emphysema. No evidence for active disease in the chest.   This report was finalized on 9/13/2023 5:40 PM by Dr. oHward Kimball M.D.       Ordered the above noted radiological studies. Reviewed by me in PACS.            PROCEDURES  Lumbar Puncture    Date/Time: 9/13/2023 8:03 PM  Performed by: Karlos Angel MD  Authorized by: Karlos Angel MD     Consent:     Consent obtained:  Verbal    Consent given by:  Patient    Risks, benefits, and alternatives were discussed: yes      Risks discussed:  Bleeding, infection, pain and headache    Alternatives discussed:  No treatment  Pre-procedure details:     Procedure purpose:  Diagnostic    Preparation: Patient was prepped and draped in usual sterile fashion    Anesthesia:     Anesthesia method:  Local infiltration    Local anesthetic:  Lidocaine 1% WITH epi  Procedure details:     Lumbar space:  L4-L5 interspace    Patient position:  L lateral decubitus    Needle gauge:  22    Needle type:  Spinal needle - Quincke tip    Needle length (in):  3.5    Ultrasound guidance: yes      Ultrasound guidance use: view anatomy of the area      Number of attempts:  2    Fluid appearance:  Clear    Tubes of fluid:  4    Total volume (ml):  8  Post-procedure details:     Puncture site:  Adhesive bandage applied and direct pressure applied    Procedure completion:  Tolerated well, no immediate complications            MEDICATIONS GIVEN IN ER  Medications   cefTRIAXone  (ROCEPHIN) 2,000 mg in sodium chloride 0.9 % 100 mL IVPB-VTB (has no administration in time range)   ampicillin 2000 mg IVPB in 100 ml NS (VTB) (has no administration in time range)   lidocaine 1% - EPINEPHrine 1:862040 (XYLOCAINE W/EPI) 1 %-1:687162 injection 10 mL (has no administration in time range)   vancomycin 1250 mg/250 mL 0.9% NS IVPB ([facility]) (has no administration in time range)   acetaminophen (TYLENOL) tablet 650 mg (has no administration in time range)     Or   acetaminophen (TYLENOL) 160 MG/5ML oral solution 650 mg (has no administration in time range)     Or   acetaminophen (TYLENOL) suppository 650 mg (has no administration in time range)   sennosides-docusate (PERICOLACE) 8.6-50 MG per tablet 2 tablet (has no administration in time range)     And   polyethylene glycol (MIRALAX) packet 17 g (has no administration in time range)     And   bisacodyl (DULCOLAX) EC tablet 5 mg (has no administration in time range)     And   bisacodyl (DULCOLAX) suppository 10 mg (has no administration in time range)   ondansetron (ZOFRAN) injection 4 mg (has no administration in time range)   lactated ringers bolus 1,000 mL (0 mL Intravenous Stopped 9/13/23 1713)   acetaminophen (TYLENOL) tablet 1,000 mg (1,000 mg Oral Given 9/13/23 1704)   ketorolac (TORADOL) injection 15 mg (15 mg Intravenous Given 9/13/23 1704)   ondansetron (ZOFRAN) injection 4 mg (4 mg Intravenous Given 9/13/23 1808)               MEDICAL DECISION MAKING, PROGRESS, and CONSULTS    MDM: Patient presented emergency department with fever, headache.  Otherwise well-appearing, vitals otherwise stable.  Labs significant for leukocytosis, elevated lactic acid.  Treated with IV fluids and antiemetics.  RVP negative for typical viral infections.  Difficult to explain new onset headache and fever of unknown origin.  Concern for meningitis.  Ordered antibiotics, blood cultures.  LP obtained, antibiotic started after CSF obtained.  Discussed with inpatient team,  will admit for further management evaluation of her symptoms.    Discussed CT head with neuroradiology, recommend MRI for further evaluation.  Discussed this with inpatient service.    All labs have been independently reviewed by me.  All radiology studies have been reviewed by me and I have also reviewed the radiology report.   EKG's independently viewed and interpreted by me.  Discussion below represents my analysis of pertinent findings related to patient's condition, differential diagnosis, treatment plan and final disposition.      Additional sources:  - Discussed/ obtained information from independent historians: Discussed case with daughter who reports she has not been feeling well    - External (non-ED) record review:     - Chronic or social conditions impacting care:     - Shared decision making: Discussed plan for admission for further testing and evaluation of her symptoms.      Orders placed during this visit:  Orders Placed This Encounter   Procedures    Bedside Lumbar Puncture    Respiratory Panel PCR w/COVID-19(SARS-CoV-2) [facility] In-House, NP Swab in UTM/VTM, 3-4 HR TAT - Swab, Nasopharynx    Blood Culture - Blood,    Blood Culture - Blood,    Culture, CSF - Cerebrospinal Fluid, Lumbar Puncture    XR Chest 1 View    CT Head Without Contrast    Comprehensive Metabolic Panel    CBC Auto Differential    Manual Differential    Lactic Acid, Plasma    STAT Lactic Acid, Reflex    Urinalysis With Culture If Indicated - Urine, Clean Catch    Protein, CSF - Cerebrospinal Fluid, Lumbar Puncture    Glucose, CSF - Cerebrospinal Fluid, Lumbar Puncture    Cell Count, CSF - Cerebrospinal Fluid, Lumbar Puncture    Basic Metabolic Panel    CBC Auto Differential    Diet: Regular/House Diet; Texture: Regular Texture (IDDSI 7); Fluid Consistency: Thin (IDDSI 0)    Vital Signs    Up With Assistance    Intake & Output    Oral Care    Place Sequential Compression Device    Maintain Sequential Compression  Device    Code Status and Medical Interventions:    Inpatient Infectious Diseases Consult    ECG 12 Lead Tachycardia    Inpatient Admission    CBC & Differential    Cell Count With Differential, CSF Use CSF Tube: 4         Additional orders considered but not ordered:  Considered MRI however will defer to inpatient service.        Differential diagnosis includes but is not limited to:    Meningitis, viral syndrome, mass lesion, dural venous sinus thrombosis      Independent interpretation of labs, radiology studies, and discussions with consultants:  ED Course as of 09/13/23 2005   Wed Sep 13, 2023   1641 EKG interpreted myself:  1625, sinus tachycardia rate of 106, no acute ST segment changes or T wave inversions. [FS]   1706 Chest x-ray interpreted myself:  No infiltrate [FS]      ED Course User Index  [FS] Karlos Angel MD           DIAGNOSIS  Final diagnoses:   Fever of unknown origin   Abnormal head CT   Suspected infectious meningitis         DISPOSITION  Admitted to [facility]        Latest Documented Vital Signs:  As of 20:05 EDT  BP- 127/68 HR- 105 Temp- 100.3 °F (37.9 °C) (Tympanic) O2 sat- 96%              --    Please note that portions of this were completed with a voice recognition program.       Note Disclaimer: At [facility], we believe that sharing information builds trust and better relationships. You are receiving this note because you are receiving care at [facility] or recently visited. It is possible you will see health information before a provider has talked with you about it. This kind of information can be easy to misunderstand. To help you fully understand what it means for your health, we urge you to discuss this note with your provider.             Karlos Angel MD  09/13/23 2005

## 2023-09-14 NOTE — PAYOR COMM NOTE
"Arline Grullon (68 y.o. Female)     ATTN: NURSE REVIEWER  RE: INITIAL INPT AUTH CLINICALS  AUTH: [id number]                                                                       PLEASE REPLY TO IRINEO SERRATO [phone] OR FAX# [phone]         Date of Birth   1955    Social Security Number       Address   [address] [address]    Home Phone   [phone]    MRN   [id number]       Protestant   Jain    Marital Status                               Admission Date   9/13/23    Admission Type   Emergency    Admitting Provider   Megan Melton MD    Attending Provider   Sonido Jean MD    Department, Room/Bed   [address], P584/1       Discharge Date       Discharge Disposition       Discharge Destination                                 Attending Provider: Sonido Jean MD    Allergies: No Known Allergies    Isolation: None   Infection: None   Code Status: CPR    Ht: 166.4 cm (65.5\")   Wt: 56.7 kg (125 lb)    Admission Cmt: None   Principal Problem: Suspected infectious meningitis [R29.818]                   Active Insurance as of 9/13/2023       Primary Coverage       Payor Plan Insurance Group Employer/Plan Group    [facility]IC[facility] REPLAC[facility] DUAL COMPLETE MEDICARE REPLACEMENT [id number]       Payor Plan Address Payor Plan Phone Number Payor Plan Fax Number Effective Dates    [address] [phone]  1/1/2022 - None Entered    [facility] [id number]         Subscriber Name Subscriber Birth Date Member ID       ARLINE GRULLON 1955 [id number]               Secondary Coverage       Payor Plan Insurance Group Employer/Plan Group    [place] [id number]       Payor Plan Address Payor Plan Phone Number Payor Plan Fax Number Effective Dates    [employer] [address] [address] [phone]  2/1/2021 - None Entered    formerly [facility] [id number]         Subscriber Name Subscriber Birth Date " Member ID       ARLINE GRULLON 1955 [id number]                     Emergency Contacts        (Rel.) Home Phone Work Phone Mobile Phone    Nik-Maty sullivan (Daughter) [phone] -- --    Moi Starks (Father) [phone] -- --    Melissa Sandoval (Daughter) -- -- [phone]                 [facility] & [facility]        [facility]          HISTORY AND PHYSICAL   [facility]        Date of Admission: 2023  Patient Identification:  Name: Arline Grullon  Age: 68 y.o.  Sex: female  :  1955  MRN: [id number]                     Primary Care Physician: Allie Farah APRN    Chief Complaint:  68 year old female who presented to the emergency room with fever, malaise, headache and chills for the last three days; she has had dizziness as well as nausea and vomiting; no cough or congestion;     History of Present Illness:   As above    Past Medical History:  Past Medical History:   Diagnosis Date    Back pain      had ruptured disc no surgery so prn c/o pain    Breast cancer 2022    Right breast invasive ductal adenocarcinoma, ER/OK positive, HER2 negative    Carotid artery stenosis     Cervical cancer     Colon polyp 2021    Rectum: hyperplastic polyp    Diverticulosis     Fibromuscular dysplasia     dx 2 yrs ago    H/O bronchitis     Hx of radiation therapy     Hyperlipidemia     Hypertension     Loose stools     Osteoporosis     Positive colorectal cancer screening using Cologuard test     Psoriasis      Past Surgical History:  Past Surgical History:   Procedure Laterality Date    BREAST BIOPSY Right 2022    US guided mammotome vaccum assisted right breast biopsy-Dr. Julián Palacios, [facility]    BREAST LUMPECTOMY      BREAST LUMPECTOMY WITH SENTINEL NODE BIOPSY Right 2022    Procedure: RIGHT BREAST WIRE LOCALIZED LUMPECTOMY WITH SENTINEL NODE BIOPSY;  Surgeon: Allie Bah MD;  Location: [facility] OR [facility];  Service: General;   Laterality: Right;    COLONOSCOPY N/A 08/20/2021    Procedure: COLONOSCOPY INTO CECUM AND TI WITH COLD BX POLYPECTOMIES;  Surgeon: Catia Kate MD;  Location: [facility] ENDOSCOPY;  Service: Gastroenterology;  Laterality: N/A;  PRE: POSITIVE COLOGUARD  POST: DIVERTICULOSIS, POLYPS, HEMORRHOIDS    DE TEAEC W/PATCH GRF CAROTID VERTB SUBCLAV NECK INC Left 09/14/2016    Procedure: LT CAROTID ENDARTERECTOMY WITH INTRA OPERATIVE CAROTID DUPLEX SCAN;  Surgeon: Arnulfo Hernandez MD;  Location: [facility] MAIN OR;  Service: Vascular    SUBTOTAL HYSTERECTOMY Bilateral 09/1987    done at [facility], ovaries still in tact      Home Meds:  (Not in a hospital admission)      Allergies:  No Known Allergies  Immunizations:  Immunization History   Administered Date(s) Administered    COVID-19 (PFIZER) Purple Cap Monovalent 04/03/2021, 04/24/2021    Hepatitis A 05/01/2019, 02/19/2020    Hepatitis B 05/01/2019, 06/03/2019, 02/19/2020    Hepatitis B Adult/Adolescent IM 05/01/2019, 06/03/2019    Pneumococcal Conjugate 20-Valent (PCV20) 08/15/2022    Tdap 05/01/2019     Social History:   Social History     Social History Narrative    Not on file     Social History     Socioeconomic History    Marital status:    Tobacco Use    Smoking status: Every Day     Packs/day: 0.50     Years: 40.00     Pack years: 20.00     Types: Cigarettes     Start date: 1/1/1982    Smokeless tobacco: Never    Tobacco comments:     1/2 PPD   Vaping Use    Vaping Use: Never used   Substance and Sexual Activity    Alcohol use: Yes     Alcohol/week: 6.0 standard drinks     Types: 2 Glasses of wine, 4 Cans of beer per week    Drug use: Never    Sexual activity: Not Currently       Family History:  Family History   Problem Relation Age of Onset    Vision loss Mother     Skin cancer Father     Skin cancer Sister     Depression Daughter     Malig Hyperthermia Neg Hx     Breast cancer Neg Hx     Ovarian cancer Neg Hx         Review of Systems  See history of present  "illness and past medical history.  Patient denies  syncope, falls, trauma, change in vision, change in hearing, change in taste, changes in weight, changes in appetite, focal weakness, numbness, or paresthesia.  Patient denies chest pain, palpitations, dyspnea, orthopnea, PND, cough, sinus pressure, rhinorrhea, epistaxis, hemoptysis,   hematemesis, diarrhea, constipation or hematochezia.  Denies cold or heat intolerance, polydipsia, polyuria, polyphagia. Denies hematuria, pyuria, dysuria, hesitancy, frequency or urgency. Denies consumption of raw and under cooked meats foods or change in water source.  Denies fever, chills, sweats, night sweats.     Objective:  T Max 24 hrs: Temp (24hrs), Av °F (38.3 °C), Min:100.3 °F (37.9 °C), Max:101.7 °F (38.7 °C)    Vitals Ranges:   Temp:  [100.3 °F (37.9 °C)-101.7 °F (38.7 °C)] 100.3 °F (37.9 °C)  Heart Rate:  [102-122] 105  Resp:  [18] 18  BP: (127-174)/(68-92) 127/68      Exam:  /68   Pulse 105   Temp 100.3 °F (37.9 °C) (Tympanic)   Resp 18   Ht 166.4 cm (65.5\")   Wt 56.7 kg (125 lb)   SpO2 96%   BMI 20.48 kg/m²     General Appearance:    Alert, cooperative, no distress, appears stated age   Head:    Normocephalic, without obvious abnormality, atraumatic   Eyes:    PERRL, conjunctivae/corneas clear, EOM's intact, both eyes   Ears:    Normal external ear canals, both ears   Nose:   Nares normal, septum midline, mucosa normal, no drainage    or sinus tenderness   Throat:   Lips, mucosa, and tongue normal   Neck:   Supple, symmetrical, trachea midline, no adenopathy;     thyroid:  no enlargement/tenderness/nodules; no carotid    bruit or JVD   Back:     Symmetric, no curvature, ROM normal, no CVA tenderness   Lungs:     Clear to auscultation bilaterally, respirations unlabored   Chest Wall:    No tenderness or deformity    Heart:    Regular rate and rhythm, S1 and S2 normal, no murmur, rub   or gallop   Abdomen:     Soft, nontender, bowel sounds active all " four quadrants,     no masses, no hepatomegaly, no splenomegaly   Extremities:   Extremities normal, atraumatic, no cyanosis or edema                       .    Data Review:  Labs in chart were reviewed.  WBC   Date Value Ref Range Status   09/13/2023 13.31 (H) 3.40 - 10.80 10*3/mm3 Final     Hemoglobin   Date Value Ref Range Status   09/13/2023 13.4 12.0 - 15.9 g/dL Final     Hematocrit   Date Value Ref Range Status   09/13/2023 38.6 34.0 - 46.6 % Final     Platelets   Date Value Ref Range Status   09/13/2023 175 140 - 450 10*3/mm3 Final     Sodium   Date Value Ref Range Status   09/13/2023 131 (L) 136 - 145 mmol/L Final     Potassium   Date Value Ref Range Status   09/13/2023 4.1 3.5 - 5.2 mmol/L Final     Comment:     Slight hemolysis detected by analyzer. Results may be affected.     Chloride   Date Value Ref Range Status   09/13/2023 96 (L) 98 - 107 mmol/L Final     CO2   Date Value Ref Range Status   09/13/2023 19.8 (L) 22.0 - 29.0 mmol/L Final     BUN   Date Value Ref Range Status   09/13/2023 15 8 - 23 mg/dL Final     Creatinine   Date Value Ref Range Status   09/13/2023 0.89 0.57 - 1.00 mg/dL Final     Glucose   Date Value Ref Range Status   09/13/2023 148 (H) 65 - 99 mg/dL Final     Calcium   Date Value Ref Range Status   09/13/2023 9.0 8.6 - 10.5 mg/dL Final                Imaging Results (All)       Procedure Component Value Units Date/Time    CT Head Without Contrast [[id number]] Collected: 09/13/23 1913     Updated: 09/13/23 1924    Narrative:      CT HEAD WITHOUT CONTRAST     CLINICAL HISTORY: Headache and fever.     TECHNIQUE: CT scan of the head was obtained with 3 mm axial soft tissue  and 2 mm axial bone algorithm algorithm images. No intravenous contrast  was administered. Sagittal and coronal reconstructions were obtained.     COMPARISON: CT angiogram of the head dated 10/30/2022.     FINDINGS:    There are subtle areas of increased density in the region of the left  aspect of the mid body of the  corpus callosum. These are best  appreciated on axial image #29. The largest of these measures up to  approximately 9 x 10 mm in greatest axial dimensions. The precise  etiology and significance of these findings is uncertain although I  cannot exclude the possibility of a mass or infectious abnormality at  this site. I recommend further evaluation with an MRI of the brain with  and without the use of IV contrast.     Otherwise, the ventricles, sulci, and cisterns are age-appropriate. The  basal ganglia and thalami are unremarkable in appearance. The posterior  fossa structures are within normal limits. Atherosclerotic  calcifications are incidentally appreciated within the intracranial  vasculature.       Impression:         There are 2 subtle areas of increased density within the left aspect of  the body of the corpus callosum that are of uncertain precise etiology  and significance. However, I cannot exclude the possibility of a mass or  an infectious abnormality at these sites. I strongly recommend further  evaluation with an MRI of the brain with and without the use of IV  contrast.     These findings and recommendations were directly discussed with Dr. Karlos Angel on 09/13/2023 at approximately 6:54 PM. .     Radiation dose reduction techniques were utilized, including automated  exposure control and exposure modulation based on body size.           This report was finalized on 9/13/2023 7:21 PM by Dr. Pastor Loco M.D.       XR Chest 1 View [[id number]] Collected: 09/13/23 1724     Updated: 09/13/23 1743    Narrative:      CHEST SINGLE VIEW     HISTORY: Nausea, vomiting, headache     COMPARISON: Two-view chest 09/07/2016, chest CT 02/17/2023.     FINDINGS: Cardiomediastinal silhouette is within normal limits. Lungs  appear clear and there is no evidence for pulmonary edema or pleural  effusion or infiltrate. There are mild changes of pulmonary emphysema.  Mild apical pleural-parenchymal scarring is  present.       Impression:      Mild pulmonary emphysema. No evidence for active disease in  the chest.         This report was finalized on 9/13/2023 5:40 PM by Dr. Howard Kimball M.D.                 Assessment:  Active Hospital Problems    Diagnosis  POA    **Suspected infectious meningitis [R29.818]  Yes      Resolved Hospital Problems   No resolved problems to display.   Dizziness  Headache  Nausea and vomiting  Hypertension  Fever  Psoriasis  Cad  Tobacco use  Hyponatremia  Hyperglycemia      Plan:  Will continue fluids  Awaiting csf studies  Ask id to see her  Awaiting cultures  Continue antibiotics  Dw patient and ed provider    Megan Melton MD  9/13/2023  22:44 EDT      Electronically signed by Megan Melton MD at 09/13/23 2250       Facility-Administered Medications as of 9/14/2023   Medication Dose Route Frequency Provider Last Rate Last Admin    acetaminophen (TYLENOL) tablet 650 mg  650 mg Oral Q4H PRN Megan Melton MD   650 mg at 09/14/23 0225    Or    acetaminophen (TYLENOL) 160 MG/5ML oral solution 650 mg  650 mg Oral Q4H PRN Megan Melton MD        Or    acetaminophen (TYLENOL) suppository 650 mg  650 mg Rectal Q4H PRN Megan Melton MD        [COMPLETED] acetaminophen (TYLENOL) tablet 1,000 mg  1,000 mg Oral Once Karlos Angel MD   1,000 mg at 09/13/23 1704    albuterol (PROVENTIL) nebulizer solution 0.083% 2.5 mg/3mL  2.5 mg Nebulization Q6H PRN Megan Melton MD        [COMPLETED] ampicillin 2000 mg IVPB in 100 ml NS (VTB)  2,000 mg Intravenous Once Karlos Angel MD   Stopped at 09/13/23 2235    ampicillin 2000 mg IVPB in 100 ml NS (VTB)  2 g Intravenous Q4H Megan eMlton  mL/hr at 09/14/23 0700 2 g at 09/14/23 0700    aspirin EC tablet 81 mg  81 mg Oral Daily Megan Melton MD   81 mg at 09/14/23 0856    atorvastatin (LIPITOR) tablet 20 mg  20 mg Oral Nightly Megan Melton MD   20 mg at 09/14/23 0225     sennosides-docusate (PERICOLACE) 8.6-50 MG per tablet 2 tablet  2 tablet Oral BID Megan Melton MD   2 tablet at 09/14/23 0856    And    polyethylene glycol (MIRALAX) packet 17 g  17 g Oral Daily PRN Megan Melton MD        And    bisacodyl (DULCOLAX) EC tablet 5 mg  5 mg Oral Daily PRN Geronimo, Megan Rowland MD        And    bisacodyl (DULCOLAX) suppository 10 mg  10 mg Rectal Daily PRN Geronimo, Megan Rowland MD        budesonide-formoterol (SYMBICORT) 160-4.5 MCG/ACT inhaler 2 puff  2 puff Inhalation BID - RT Megan Melton MD   2 puff at 09/14/23 0736    [COMPLETED] cefTRIAXone (ROCEPHIN) 2,000 mg in sodium chloride 0.9 % 100 mL IVPB-VTB  2,000 mg Intravenous Once Karlos Angel MD   Stopped at 09/13/23 2209    cefTRIAXone (ROCEPHIN) 2,000 mg in sodium chloride 0.9 % 100 mL IVPB-VTB  2,000 mg Intravenous Q12H Megan Melton  mL/hr at 09/14/23 0857 2,000 mg at 09/14/23 0857    [COMPLETED] ketorolac (TORADOL) injection 15 mg  15 mg Intravenous Once Karlos Angel MD   15 mg at 09/13/23 1704    [COMPLETED] lactated ringers bolus 1,000 mL  1,000 mL Intravenous Once Karlos Angel MD   Stopped at 09/13/23 1713    [COMPLETED] lidocaine 1% - EPINEPHrine 1:198850 (XYLOCAINE W/EPI) 1 %-1:566238 injection 10 mL  10 mL Injection Once Karlos Angel MD   10 mL at 09/13/23 1840    losartan (COZAAR) tablet 100 mg  100 mg Oral Q24H Megan Melton MD   100 mg at 09/14/23 0856    [COMPLETED] ondansetron (ZOFRAN) injection 4 mg  4 mg Intravenous Once Karlos Angel MD   4 mg at 09/13/23 1808    ondansetron (ZOFRAN) injection 4 mg  4 mg Intravenous Q6H PRN Megan Melton MD        Pharmacy to dose vancomycin   Does not apply Continuous PRN Megan Melton MD        sodium chloride 0.9 % infusion  75 mL/hr Intravenous Continuous Megan Melton MD 75 mL/hr at 09/14/23 0222 75 mL/hr at 09/14/23 0222    vancomycin (VANCOCIN) 1000 mg/200 mL dextrose 5% IVPB   1,000 mg Intravenous Q18H Megan Melton MD        [COMPLETED] vancomycin 1250 mg/250 mL 0.9% NS IVPB ([facility])  20 mg/kg Intravenous Once Karlos Angel MD   1,250 mg at 09/13/23 2234     Lab Results (last 48 hours)       Procedure Component Value Units Date/Time    Culture, CSF - Cerebrospinal Fluid, Lumbar Puncture [[id number]] Collected: 09/13/23 1939    Specimen: Cerebrospinal Fluid from Lumbar Puncture Updated: 09/14/23 0841     CSF Culture No growth     Gram Stain No WBCs or organisms seen    Urinalysis With Culture If Indicated - Urine, Clean Catch [[id number]]  (Abnormal) Collected: 09/14/23 0543    Specimen: Urine, Clean Catch Updated: 09/14/23 0712     Color, UA Yellow     Appearance, UA Clear     pH, UA 5.5     Specific Gravity, UA >=1.030     Glucose, UA Negative     Ketones, UA Trace     Bilirubin, UA Negative     Blood, UA Negative     Protein, UA Trace     Leuk Esterase, UA Negative     Nitrite, UA Negative     Urobilinogen, UA 0.2 E.U./dL    Narrative:      In absence of clinical symptoms, the presence of pyuria, bacteria, and/or nitrites on the urinalysis result does not correlate with infection.  Urine microscopic not indicated.    Manual Differential [[id number]]  (Abnormal) Collected: 09/14/23 0605    Specimen: Blood Updated: 09/14/23 0655     Neutrophil % 81.6 %      Lymphocyte % 8.2 %      Monocyte % 10.2 %      Neutrophils Absolute 10.68 10*3/mm3      Lymphocytes Absolute 1.07 10*3/mm3      Monocytes Absolute 1.34 10*3/mm3      Polychromasia Slight/1+     WBC Morphology Normal     Giant Platelets Slight/1+    CBC Auto Differential [[id number]]  (Abnormal) Collected: 09/14/23 0605    Specimen: Blood Updated: 09/14/23 0655     WBC 13.09 10*3/mm3      RBC 3.61 10*6/mm3      Hemoglobin 11.3 g/dL      Hematocrit 33.1 %      MCV 91.7 fL      MCH 31.3 pg      MCHC 34.1 g/dL      RDW 13.0 %      RDW-SD 43.0 fl      MPV 11.4 fL      Platelets 144 10*3/mm3     Basic Metabolic Panel [[id number]]   (Abnormal) Collected: 09/14/23 0605    Specimen: Blood Updated: 09/14/23 0649     Glucose 106 mg/dL      BUN 17 mg/dL      Creatinine 0.73 mg/dL      Sodium 135 mmol/L      Potassium 3.7 mmol/L      Chloride 102 mmol/L      CO2 21.6 mmol/L      Calcium 8.4 mg/dL      BUN/Creatinine Ratio 23.3     Anion Gap 11.4 mmol/L      eGFR 90.3 mL/min/1.73     Narrative:      GFR Normal >60  Chronic Kidney Disease <60  Kidney Failure <15      STAT Lactic Acid, Reflex [[id number]]  (Normal) Collected: 09/13/23 2222    Specimen: Blood Updated: 09/13/23 2254     Lactate 1.0 mmol/L     Meningitis / Encephalitis Panel, PCR - Cerebrospinal Fluid, Lumbar Puncture [[id number]]  (Normal) Collected: 09/13/23 1939    Specimen: Cerebrospinal Fluid from Lumbar Puncture Updated: 09/13/23 2146     ESCHERICHIA COLI K1, PCR Not Detected     HAEMOPHILUS INFLUENZAE, PCR Not Detected     LISTERIA MONOCYTOGENES, PCR Not Detected     NEISSERIA MENINGITIDIS, PCR Not Detected     STREPTOCOCCUS AGALACTIAE, PCR Not Detected     STREPTOCOCCUS PNEUMONIAE, PCR Not Detected     CYTOMEGALOVIRUS (CMV), PCR Not Detected     ENTEROVIRUS, PCR Not Detected     HERPES SIMPLEX VIRUS 1 (HSV-1), PCR Not Detected     HERPES SIMPLEX VIRUS 2 (HSV-2), PCR Not Detected     HUMAN PARECHOVIRUS, PCR Not Detected     VARICELLA ZOSTER VIRUS (VZV), PCR Not Detected     CRYPTOCOCCUS NEOFORMANS / GATTII, PCR Not Detected     HUMAN HERPES VIRUS 6 PCR Not Detected    Protein, CSF - Cerebrospinal Fluid, Lumbar Puncture [[id number]]  (Abnormal) Collected: 09/13/23 1939    Specimen: Cerebrospinal Fluid from Lumbar Puncture Updated: 09/13/23 2042     Protein, Total (CSF) 73.5 mg/dL     Glucose, CSF - Cerebrospinal Fluid, Lumbar Puncture [[id number]]  (Abnormal) Collected: 09/13/23 1939    Specimen: Cerebrospinal Fluid from Lumbar Puncture Updated: 09/13/23 2042     Glucose, CSF 84 mg/dL     Cell Count With Differential, CSF Use CSF Tube: 4 [[id number]]  (Abnormal) Collected: 09/13/23 1939     Specimen: Cerebrospinal Fluid from Lumbar Puncture Updated: 09/13/23 2037    Narrative:      The following orders were created for panel order Cell Count With Differential, CSF Use CSF Tube: 4.  Procedure                               Abnormality         Status                     ---------                               -----------         ------                     Cell Count, CSF - Cerebr...[[id number]]  Abnormal            Final result               Spinal fluid differentia...[[id number]]                      Final result                 Please view results for these tests on the individual orders.    Spinal fluid differential - Cerebrospinal Fluid, Lumbar Puncture [[id number]] Collected: 09/13/23 1939    Specimen: Cerebrospinal Fluid from Lumbar Puncture Updated: 09/13/23 2037     Neutrophils, CSF 41 %      Lymphocytes, CSF 50 %      Monocytes, CSF 9 %     Cell Count, CSF - Cerebrospinal Fluid, Lumbar Puncture [[id number]]  (Abnormal) Collected: 09/13/23 1939    Specimen: Cerebrospinal Fluid from Lumbar Puncture Updated: 09/13/23 2021     Color, CSF Colorless     Appearance, CSF Clear     RBC, CSF 1 /mm3      Nucleated Cells, CSF 31 /mm3      Tube Number, CSF 3     Method: UF 1000i Automated Method    Narrative:      This test was developed, its performance characteristics determined and judged suitable for clinical purposes by [facility]. It has not been cleared or approved by the FDA. The laboratory is regulated under CLIA as qualified to perform high-complexity testing.    Respiratory Panel PCR w/COVID-19(SARS-CoV-2) MIKE/ISHA/MARIA LUZ/PAD/COR/MAD/EMILIA In-House, NP Swab in UTM/[facility], 3-4 HR TAT - Swab, Nasopharynx [[id number]]  (Normal) Collected: 09/13/23 1633    Specimen: Swab from Nasopharynx Updated: 09/13/23 1811     ADENOVIRUS, PCR Not Detected     Coronavirus 229E Not Detected     Coronavirus HKU1 Not Detected     Coronavirus NL63 Not Detected     Coronavirus OC43 Not Detected     COVID19  Not Detected     Human Metapneumovirus Not Detected     Human Rhinovirus/Enterovirus Not Detected     Influenza A PCR Not Detected     Influenza B PCR Not Detected     Parainfluenza Virus 1 Not Detected     Parainfluenza Virus 2 Not Detected     Parainfluenza Virus 3 Not Detected     Parainfluenza Virus 4 Not Detected     RSV, PCR Not Detected     Bordetella pertussis pcr Not Detected     Bordetella parapertussis PCR Not Detected     Chlamydophila pneumoniae PCR Not Detected     Mycoplasma pneumo by PCR Not Detected    Narrative:      In the setting of a positive respiratory panel with a viral infection PLUS a negative procalcitonin without other underlying concern for bacterial infection, consider observing off antibiotics or discontinuation of antibiotics and continue supportive care. If the respiratory panel is positive for atypical bacterial infection (Bordetella pertussis, Chlamydophila pneumoniae, or Mycoplasma pneumoniae), consider antibiotic de-escalation to target atypical bacterial infection.    Lactic Acid, Plasma [[id number]]  (Abnormal) Collected: 09/13/23 1712    Specimen: Blood from Arm, Left Updated: 09/13/23 1751     Lactate 2.9 mmol/L     Manual Differential [[id number]]  (Abnormal) Collected: 09/13/23 1633    Specimen: Blood from Arm, Right Updated: 09/13/23 1750     Neutrophil % 88.8 %      Lymphocyte % 8.2 %      Monocyte % 3.1 %      Neutrophils Absolute 11.82 10*3/mm3      Lymphocytes Absolute 1.09 10*3/mm3      Monocytes Absolute 0.41 10*3/mm3      RBC Morphology Normal     WBC Morphology Normal     Platelet Morphology Normal    Blood Culture - Blood, Hand, Right [[id number]] Collected: 09/13/23 1723    Specimen: Blood from Hand, Right Updated: 09/13/23 1726    Blood Culture - Blood, Arm, Left [[id number]] Collected: 09/13/23 1712    Specimen: Blood from Arm, Left Updated: 09/13/23 1719    Comprehensive Metabolic Panel [[id number]]  (Abnormal) Collected: 09/13/23 1633    Specimen: Blood from Arm,  Right Updated: 09/13/23 1707     Glucose 148 mg/dL      BUN 15 mg/dL      Creatinine 0.89 mg/dL      Sodium 131 mmol/L      Potassium 4.1 mmol/L      Comment: Slight hemolysis detected by analyzer. Results may be affected.        Chloride 96 mmol/L      CO2 19.8 mmol/L      Calcium 9.0 mg/dL      Total Protein 7.2 g/dL      Albumin 4.6 g/dL      ALT (SGPT) 13 U/L      AST (SGOT) 23 U/L      Alkaline Phosphatase 73 U/L      Total Bilirubin 0.7 mg/dL      Globulin 2.6 gm/dL      A/G Ratio 1.8 g/dL      BUN/Creatinine Ratio 16.9     Anion Gap 15.2 mmol/L      eGFR 71.2 mL/min/1.73     Narrative:      GFR Normal >60  Chronic Kidney Disease <60  Kidney Failure <15      CBC & Differential [[id number]]  (Abnormal) Collected: 09/13/23 1633    Specimen: Blood from Arm, Right Updated: 09/13/23 1658    Narrative:      The following orders were created for panel order CBC & Differential.  Procedure                               Abnormality         Status                     ---------                               -----------         ------                     CBC Auto Differential[[id number]]        Abnormal            Final result                 Please view results for these tests on the individual orders.    CBC Auto Differential [[id number]]  (Abnormal) Collected: 09/13/23 1633    Specimen: Blood from Arm, Right Updated: 09/13/23 1658     WBC 13.31 10*3/mm3      RBC 4.19 10*6/mm3      Hemoglobin 13.4 g/dL      Hematocrit 38.6 %      MCV 92.1 fL      MCH 32.0 pg      MCHC 34.7 g/dL      RDW 13.2 %      RDW-SD 43.8 fl      MPV 11.9 fL      Platelets 175 10*3/mm3      nRBC 0.0 /100 WBC           Imaging Results (Last 48 Hours)       Procedure Component Value Units Date/Time    CT Head Without Contrast [[id number]] Collected: 09/13/23 1913     Updated: 09/13/23 1924    Narrative:      CT HEAD WITHOUT CONTRAST     CLINICAL HISTORY: Headache and fever.     TECHNIQUE: CT scan of the head was obtained with 3 mm axial soft tissue  and 2  mm axial bone algorithm algorithm images. No intravenous contrast  was administered. Sagittal and coronal reconstructions were obtained.     COMPARISON: CT angiogram of the head dated 10/30/2022.     FINDINGS:    There are subtle areas of increased density in the region of the left  aspect of the mid body of the corpus callosum. These are best  appreciated on axial image #29. The largest of these measures up to  approximately 9 x 10 mm in greatest axial dimensions. The precise  etiology and significance of these findings is uncertain although I  cannot exclude the possibility of a mass or infectious abnormality at  this site. I recommend further evaluation with an MRI of the brain with  and without the use of IV contrast.     Otherwise, the ventricles, sulci, and cisterns are age-appropriate. The  basal ganglia and thalami are unremarkable in appearance. The posterior  fossa structures are within normal limits. Atherosclerotic  calcifications are incidentally appreciated within the intracranial  vasculature.       Impression:         There are 2 subtle areas of increased density within the left aspect of  the body of the corpus callosum that are of uncertain precise etiology  and significance. However, I cannot exclude the possibility of a mass or  an infectious abnormality at these sites. I strongly recommend further  evaluation with an MRI of the brain with and without the use of IV  contrast.     These findings and recommendations were directly discussed with Dr. Karlos Angel on 09/13/2023 at approximately 6:54 PM. .     Radiation dose reduction techniques were utilized, including automated  exposure control and exposure modulation based on body size.           This report was finalized on 9/13/2023 7:21 PM by Dr. Pastor Loco M.D.       XR Chest 1 View [[id number]] Collected: 09/13/23 1724     Updated: 09/13/23 1743    Narrative:      CHEST SINGLE VIEW     HISTORY: Nausea, vomiting, headache     COMPARISON:  Two-view chest 09/07/2016, chest CT 02/17/2023.     FINDINGS: Cardiomediastinal silhouette is within normal limits. Lungs  appear clear and there is no evidence for pulmonary edema or pleural  effusion or infiltrate. There are mild changes of pulmonary emphysema.  Mild apical pleural-parenchymal scarring is present.       Impression:      Mild pulmonary emphysema. No evidence for active disease in  the chest.         This report was finalized on 9/13/2023 5:40 PM by Dr. Howard Kimball M.D.             ECG/EMG Results (last 48 hours)       Procedure Component Value Units Date/Time    ECG 12 Lead Tachycardia [[id number]] Collected: 09/13/23 1625     Updated: 09/13/23 1821     QT Interval 341 ms      QTC Interval 453 ms     Narrative:      HEART RATE= 106  bpm  RR Interval= 566  ms  CT Interval= 148  ms  P Horizontal Axis= 21  deg  P Front Axis= 81  deg  QRSD Interval= 103  ms  QT Interval= 341  ms  QTcB= 453  ms  QRS Axis= 76  deg  T Wave Axis= 61  deg  - BORDERLINE ECG -  Sinus tachycardia - new  Prominent P waves, nondiagnostic  Electronically Signed By: Jennifer Briones ([facility]) 13-Sep-2023 18:21:19  Date and Time of Study: 2023-09-13 16:25:14    SCANNED - TELEMETRY   [[id number]] Resulted: 09/13/23     Updated: 09/14/23 0047    SCANNED - TELEMETRY   [[id number]] Resulted: 09/13/23     Updated: 09/14/23 0300    SCANNED - TELEMETRY   [[id number]] Resulted: 09/13/23     Updated: 09/14/23 0739          Orders (last 48 hrs)        Start     Ordered    09/15/23 0330  Vancomycin, Trough  Timed         09/14/23 0109 09/14/23 1000  vancomycin (VANCOCIN) 1000 mg/200 mL dextrose 5% IVPB  Every 18 Hours         09/14/23 0109 09/14/23 0930  cefTRIAXone (ROCEPHIN) 2,000 mg in sodium chloride 0.9 % 100 mL IVPB-VTB  Every 12 Hours         09/13/23 2252 09/14/23 0900  aspirin EC tablet 81 mg  Daily         09/13/23 2253 09/14/23 0900  losartan (COZAAR) tablet 100 mg  Every 24 Hours Scheduled         09/13/23 2253     09/14/23 0800  Oral Care  2 Times Daily       09/13/23 1932 09/14/23 0630  Manual Differential  Once         09/14/23 0629    09/14/23 0600  Basic Metabolic Panel  Morning Draw         09/13/23 1932 09/14/23 0600  CBC Auto Differential  Morning Draw         09/13/23 1932 09/14/23 0200  ampicillin 2000 mg IVPB in 100 ml NS (VTB)  Every 4 Hours         09/13/23 2252 09/13/23 2345  atorvastatin (LIPITOR) tablet 20 mg  Nightly         09/13/23 2253 09/13/23 2345  budesonide-formoterol (SYMBICORT) 160-4.5 MCG/ACT inhaler 2 puff  2 Times Daily - RT         09/13/23 2253 09/13/23 2259  sodium chloride 0.9 % infusion  Continuous         09/13/23 2243 09/13/23 2252  albuterol (PROVENTIL) nebulizer solution 0.083% 2.5 mg/3mL  Every 6 Hours PRN         09/13/23 2253 09/13/23 2252  Pharmacy to dose vancomycin  Continuous PRN         09/13/23 2252 09/13/23 2100  sennosides-docusate (PERICOLACE) 8.6-50 MG per tablet 2 tablet  2 Times Daily        See Hyperspace for full Linked Orders Report.    09/13/23 1932 09/13/23 2021  Spinal fluid differential - Cerebrospinal Fluid, Lumbar Puncture  Once         09/13/23 2020 09/13/23 2021  Meningitis / Encephalitis Panel, PCR - Cerebrospinal Fluid, Lumbar Puncture  Once         09/13/23 2020 09/13/23 2012  STAT Lactic Acid, Reflex  PROCEDURE ONCE         09/13/23 1751 09/13/23 2004  Lumbar Puncture  Once        Comments: This order was created via procedure documentation    09/13/23 2003 09/13/23 2000  Vital Signs  Every 4 Hours       09/13/23 1932 09/13/23 1933  Inpatient Infectious Diseases Consult  Once        Specialty:  Infectious Diseases  Provider:  Kiki Lewis MD    09/13/23 1932 09/13/23 1932  Code Status and Medical Interventions:  Continuous         09/13/23 1932 09/13/23 1932  Diet: Regular/House Diet; Texture: Regular Texture (IDDSI 7); Fluid Consistency: Thin (IDDSI 0)  Diet Effective Now         09/13/23 1932 09/13/23  1932  Intake & Output  Every Shift       09/13/23 1932 09/13/23 1932  Place Sequential Compression Device  Once         09/13/23 1932 09/13/23 1932  Maintain Sequential Compression Device  Continuous         09/13/23 1932 09/13/23 1931  polyethylene glycol (MIRALAX) packet 17 g  Daily PRN        See Hyperspace for full Linked Orders Report.    09/13/23 1932    09/13/23 1931  bisacodyl (DULCOLAX) EC tablet 5 mg  Daily PRN        See Hyperspace for full Linked Orders Report.    09/13/23 1932    09/13/23 1931  bisacodyl (DULCOLAX) suppository 10 mg  Daily PRN        See Hyperspace for full Linked Orders Report.    09/13/23 1932 09/13/23 1931  ondansetron (ZOFRAN) injection 4 mg  Every 6 Hours PRN         09/13/23 1932 09/13/23 1931  acetaminophen (TYLENOL) tablet 650 mg  Every 4 Hours PRN        See Hyperspace for full Linked Orders Report.    09/13/23 1932 09/13/23 1931  acetaminophen (TYLENOL) 160 MG/5ML oral solution 650 mg  Every 4 Hours PRN        See Hyperspace for full Linked Orders Report.    09/13/23 1932 09/13/23 1931  acetaminophen (TYLENOL) suppository 650 mg  Every 4 Hours PRN        See Hyperspace for full Linked Orders Report.    09/13/23 1932 09/13/23 1927  Inpatient Admission  Once         09/13/23 1926 09/13/23 1926  Cell Count With Differential, CSF Use CSF Tube: 4  Once         09/13/23 1925    09/13/23 1926  Protein, CSF - Cerebrospinal Fluid, Lumbar Puncture  Once         09/13/23 1925 09/13/23 1926  Glucose, CSF - Cerebrospinal Fluid, Lumbar Puncture  Once         09/13/23 1925 09/13/23 1926  Culture, CSF - Cerebrospinal Fluid, Lumbar Puncture  Once         09/13/23 1925 09/13/23 1926  CSF Tube - Cerebrospinal Fluid, Lumbar Puncture  Once,   Status:  Canceled        Comments: Do NOT Apply To a Tube For Which Labs Are Already Ordered      09/13/23 1925 09/13/23 1926  Cell Count, CSF - Cerebrospinal Fluid, Lumbar Puncture  PROCEDURE ONCE         09/13/23 1925     09/13/23 1842  vancomycin 1250 mg/250 mL 0.9% NS IVPB ([facility])  Once         09/13/23 1826    09/13/23 1841  lidocaine (XYLOCAINE) 1 % injection 10 mL  Once,   Status:  Discontinued         09/13/23 1825    09/13/23 1841  lidocaine 1% - EPINEPHrine 1:327894 (XYLOCAINE W/EPI) 1 %-1:361737 injection 10 mL  Once         09/13/23 1825    09/13/23 1834  cefTRIAXone (ROCEPHIN) 2,000 mg in sodium chloride 0.9 % 100 mL IVPB-VTB  Once         09/13/23 1819 09/13/23 1834  ampicillin 2000 mg IVPB in 100 ml NS (VTB)  Once         09/13/23 1819 09/13/23 1833  Pharmacy to dose vancomycin  Once,   Status:  Discontinued         09/13/23 1819 09/13/23 1818  Urinalysis With Culture If Indicated - Urine, Clean Catch  Once         09/13/23 1817 09/13/23 1813  ondansetron (ZOFRAN) injection 4 mg  Once         09/13/23 1757    09/13/23 1706  Blood Culture - Blood, Arm, Left  Once         09/13/23 1705    09/13/23 1706  Blood Culture - Blood, Hand, Right  Once         09/13/23 1705    09/13/23 1706  Lactic Acid, Plasma  Once         09/13/23 1705    09/13/23 1706  CT Head Without Contrast  1 Time Imaging         09/13/23 1705    09/13/23 1658  acetaminophen (TYLENOL) tablet 1,000 mg  Once         09/13/23 1642    09/13/23 1658  ketorolac (TORADOL) injection 15 mg  Once         09/13/23 1642    09/13/23 1652  Manual Differential  Once         09/13/23 1651    09/13/23 1628  lactated ringers bolus 1,000 mL  Once         09/13/23 1612    09/13/23 1605  CBC & Differential  Once         09/13/23 1612    09/13/23 1605  Comprehensive Metabolic Panel  Once         09/13/23 1612    09/13/23 1605  Respiratory Panel PCR w/COVID-19(SARS-CoV-2) MIKE/ISHA/MARIA LUZ/PAD/COR/MAD/EMILIA In-House, NP Swab in UTM/VTM, 3-4 HR TAT - Swab, Nasopharynx  STAT         09/13/23 1612    09/13/23 1605  XR Chest 1 View  1 Time Imaging         09/13/23 1612    09/13/23 1605  ECG 12 Lead Tachycardia  Once         09/13/23 1612    09/13/23 1605  CBC Auto Differential   PROCEDURE ONCE         09/13/23 1612    Unscheduled  Up With Assistance  As Needed       09/13/23 1932    --  SCANNED - TELEMETRY           09/13/23 0000    --  SCANNED - TELEMETRY           09/13/23 0000    --  SCANNED - TELEMETRY           09/13/23 0000                  Operative/Procedure Notes (last 48 hours)  Notes from 09/12/23 1007 through 09/14/23 1007   No notes of this type exist for this encounter.       Physician Progress Notes (last 48 hours)  Notes from 09/12/23 1007 through 09/14/23 1007   No notes of this type exist for this encounter.       Consult Notes (last 48 hours)  Notes from 09/12/23 1007 through 09/14/23 1007   No notes of this type exist for this encounter.

## 2023-09-14 NOTE — H&P
HISTORY AND PHYSICAL   [facility]        Date of Admission: 2023  Patient Identification:  Name: Arline Zaragoza  Age: 68 y.o.  Sex: female  :  1955  MRN: [id number]                     Primary Care Physician: Allie Farah APRN    Chief Complaint:  68 year old female who presented to the emergency room with fever, malaise, headache and chills for the last three days; she has had dizziness as well as nausea and vomiting; no cough or congestion;     History of Present Illness:   As above    Past Medical History:  Past Medical History:   Diagnosis Date    Back pain      had ruptured disc no surgery so prn c/o pain    Breast cancer 2022    Right breast invasive ductal adenocarcinoma, ER/AR positive, HER2 negative    Carotid artery stenosis     Cervical cancer     Colon polyp 2021    Rectum: hyperplastic polyp    Diverticulosis     Fibromuscular dysplasia     dx 2 yrs ago    H/O bronchitis     Hx of radiation therapy     Hyperlipidemia     Hypertension     Loose stools     Osteoporosis     Positive colorectal cancer screening using Cologuard test     Psoriasis      Past Surgical History:  Past Surgical History:   Procedure Laterality Date    BREAST BIOPSY Right 2022    US guided mammotome vaccum assisted right breast biopsy-Dr. Julián Palacios, [facility]    BREAST LUMPECTOMY      BREAST LUMPECTOMY WITH SENTINEL NODE BIOPSY Right 2022    Procedure: RIGHT BREAST WIRE LOCALIZED LUMPECTOMY WITH SENTINEL NODE BIOPSY;  Surgeon: Allie Bah MD;  Location: [facility] OR [facility];  Service: General;  Laterality: Right;    COLONOSCOPY N/A 2021    Procedure: COLONOSCOPY INTO CECUM AND TI WITH COLD BX POLYPECTOMIES;  Surgeon: Catia Kate MD;  Location: [facility] ENDOSCOPY;  Service: Gastroenterology;  Laterality: N/A;  PRE: POSITIVE COLOGUARD  POST: DIVERTICULOSIS, POLYPS, HEMORRHOIDS    AR TEAEC W/PATCH GRF CAROTID VERTB SUBCLAV NECK INC Left 2016    Procedure: LT CAROTID  ENDARTERECTOMY WITH INTRA OPERATIVE CAROTID DUPLEX SCAN;  Surgeon: Arnulfo Hernandez MD;  Location: [facility] OR;  Service: Vascular    SUBTOTAL HYSTERECTOMY Bilateral 09/1987    done at [facility], ovaries still in tact      Home Meds:  (Not in a hospital admission)      Allergies:  No Known Allergies  Immunizations:  Immunization History   Administered Date(s) Administered    COVID-19 (PFIZER) Purple Cap Monovalent 04/03/2021, 04/24/2021    Hepatitis A 05/01/2019, 02/19/2020    Hepatitis B 05/01/2019, 06/03/2019, 02/19/2020    Hepatitis B Adult/Adolescent IM 05/01/2019, 06/03/2019    Pneumococcal Conjugate 20-Valent (PCV20) 08/15/2022    Tdap 05/01/2019     Social History:   Social History     Social History Narrative    Not on file     Social History     Socioeconomic History    Marital status:    Tobacco Use    Smoking status: Every Day     Packs/day: 0.50     Years: 40.00     Pack years: 20.00     Types: Cigarettes     Start date: 1/1/1982    Smokeless tobacco: Never    Tobacco comments:     1/2 PPD   Vaping Use    Vaping Use: Never used   Substance and Sexual Activity    Alcohol use: Yes     Alcohol/week: 6.0 standard drinks     Types: 2 Glasses of wine, 4 Cans of beer per week    Drug use: Never    Sexual activity: Not Currently       Family History:  Family History   Problem Relation Age of Onset    Vision loss Mother     Skin cancer Father     Skin cancer Sister     Depression Daughter     Malig Hyperthermia Neg Hx     Breast cancer Neg Hx     Ovarian cancer Neg Hx         Review of Systems  See history of present illness and past medical history.  Patient denies  syncope, falls, trauma, change in vision, change in hearing, change in taste, changes in weight, changes in appetite, focal weakness, numbness, or paresthesia.  Patient denies chest pain, palpitations, dyspnea, orthopnea, PND, cough, sinus pressure, rhinorrhea, epistaxis, hemoptysis,   hematemesis, diarrhea, constipation or hematochezia.   "Denies cold or heat intolerance, polydipsia, polyuria, polyphagia. Denies hematuria, pyuria, dysuria, hesitancy, frequency or urgency. Denies consumption of raw and under cooked meats foods or change in water source.  Denies fever, chills, sweats, night sweats.     Objective:  T Max 24 hrs: Temp (24hrs), Av °F (38.3 °C), Min:100.3 °F (37.9 °C), Max:101.7 °F (38.7 °C)    Vitals Ranges:   Temp:  [100.3 °F (37.9 °C)-101.7 °F (38.7 °C)] 100.3 °F (37.9 °C)  Heart Rate:  [102-122] 105  Resp:  [18] 18  BP: (127-174)/(68-92) 127/68      Exam:  /68   Pulse 105   Temp 100.3 °F (37.9 °C) (Tympanic)   Resp 18   Ht 166.4 cm (65.5\")   Wt 56.7 kg (125 lb)   SpO2 96%   BMI 20.48 kg/m²     General Appearance:    Alert, cooperative, no distress, appears stated age   Head:    Normocephalic, without obvious abnormality, atraumatic   Eyes:    PERRL, conjunctivae/corneas clear, EOM's intact, both eyes   Ears:    Normal external ear canals, both ears   Nose:   Nares normal, septum midline, mucosa normal, no drainage    or sinus tenderness   Throat:   Lips, mucosa, and tongue normal   Neck:   Supple, symmetrical, trachea midline, no adenopathy;     thyroid:  no enlargement/tenderness/nodules; no carotid    bruit or JVD   Back:     Symmetric, no curvature, ROM normal, no CVA tenderness   Lungs:     Clear to auscultation bilaterally, respirations unlabored   Chest Wall:    No tenderness or deformity    Heart:    Regular rate and rhythm, S1 and S2 normal, no murmur, rub   or gallop   Abdomen:     Soft, nontender, bowel sounds active all four quadrants,     no masses, no hepatomegaly, no splenomegaly   Extremities:   Extremities normal, atraumatic, no cyanosis or edema                       .    Data Review:  Labs in chart were reviewed.  WBC   Date Value Ref Range Status   2023 13.31 (H) 3.40 - 10.80 10*3/mm3 Final     Hemoglobin   Date Value Ref Range Status   2023 13.4 12.0 - 15.9 g/dL Final     Hematocrit "   Date Value Ref Range Status   09/13/2023 38.6 34.0 - 46.6 % Final     Platelets   Date Value Ref Range Status   09/13/2023 175 140 - 450 10*3/mm3 Final     Sodium   Date Value Ref Range Status   09/13/2023 131 (L) 136 - 145 mmol/L Final     Potassium   Date Value Ref Range Status   09/13/2023 4.1 3.5 - 5.2 mmol/L Final     Comment:     Slight hemolysis detected by analyzer. Results may be affected.     Chloride   Date Value Ref Range Status   09/13/2023 96 (L) 98 - 107 mmol/L Final     CO2   Date Value Ref Range Status   09/13/2023 19.8 (L) 22.0 - 29.0 mmol/L Final     BUN   Date Value Ref Range Status   09/13/2023 15 8 - 23 mg/dL Final     Creatinine   Date Value Ref Range Status   09/13/2023 0.89 0.57 - 1.00 mg/dL Final     Glucose   Date Value Ref Range Status   09/13/2023 148 (H) 65 - 99 mg/dL Final     Calcium   Date Value Ref Range Status   09/13/2023 9.0 8.6 - 10.5 mg/dL Final                Imaging Results (All)       Procedure Component Value Units Date/Time    CT Head Without Contrast [[id number]] Collected: 09/13/23 1913     Updated: 09/13/23 1924    Narrative:      CT HEAD WITHOUT CONTRAST     CLINICAL HISTORY: Headache and fever.     TECHNIQUE: CT scan of the head was obtained with 3 mm axial soft tissue  and 2 mm axial bone algorithm algorithm images. No intravenous contrast  was administered. Sagittal and coronal reconstructions were obtained.     COMPARISON: CT angiogram of the head dated 10/30/2022.     FINDINGS:    There are subtle areas of increased density in the region of the left  aspect of the mid body of the corpus callosum. These are best  appreciated on axial image #29. The largest of these measures up to  approximately 9 x 10 mm in greatest axial dimensions. The precise  etiology and significance of these findings is uncertain although I  cannot exclude the possibility of a mass or infectious abnormality at  this site. I recommend further evaluation with an MRI of the brain with  and  without the use of IV contrast.     Otherwise, the ventricles, sulci, and cisterns are age-appropriate. The  basal ganglia and thalami are unremarkable in appearance. The posterior  fossa structures are within normal limits. Atherosclerotic  calcifications are incidentally appreciated within the intracranial  vasculature.       Impression:         There are 2 subtle areas of increased density within the left aspect of  the body of the corpus callosum that are of uncertain precise etiology  and significance. However, I cannot exclude the possibility of a mass or  an infectious abnormality at these sites. I strongly recommend further  evaluation with an MRI of the brain with and without the use of IV  contrast.     These findings and recommendations were directly discussed with Dr. Karlos Angel on 09/13/2023 at approximately 6:54 PM. .     Radiation dose reduction techniques were utilized, including automated  exposure control and exposure modulation based on body size.           This report was finalized on 9/13/2023 7:21 PM by Dr. Pastor Loco M.D.       XR Chest 1 View [[id number]] Collected: 09/13/23 1724     Updated: 09/13/23 1743    Narrative:      CHEST SINGLE VIEW     HISTORY: Nausea, vomiting, headache     COMPARISON: Two-view chest 09/07/2016, chest CT 02/17/2023.     FINDINGS: Cardiomediastinal silhouette is within normal limits. Lungs  appear clear and there is no evidence for pulmonary edema or pleural  effusion or infiltrate. There are mild changes of pulmonary emphysema.  Mild apical pleural-parenchymal scarring is present.       Impression:      Mild pulmonary emphysema. No evidence for active disease in  the chest.         This report was finalized on 9/13/2023 5:40 PM by Dr. Howard Kimball M.D.                 Assessment:  Active Hospital Problems    Diagnosis  POA    **Suspected infectious meningitis [R29.818]  Yes      Resolved Hospital Problems   No resolved problems to display.    Dizziness  Headache  Nausea and vomiting  Hypertension  Fever  Psoriasis  Cad  Tobacco use  Hyponatremia  Hyperglycemia      Plan:  Will continue fluids  Awaiting csf studies  Ask id to see her  Awaiting cultures  Continue antibiotics  Dw patient and ed provider    Megan Melton MD  9/13/2023  22:44 EDT

## 2023-09-14 NOTE — CONSULTS
Referring Provider: Megan Melton MD      Subjective   History of present illness: This is a 68-year-old with a history of invasive ductal carcinoma of the right breast treated with resection, radiation and raloxifene.  She says she was doing well up until about 1 week ago when she developed fevers, shaking chills, nausea vomiting and headache.  She eventually presented to the emergency department where CT was obtained showing 2 areas of increased density with the corpus callosum.  She underwent LP with mild pleocytosis of 31 with 50% lymphocytes and 41% neutrophils.  No new meds or recent abx    Past Medical History:   Diagnosis Date    Back pain     2005 had ruptured disc no surgery so prn c/o pain    Breast cancer 03/09/2022    Right breast invasive ductal adenocarcinoma, ER/IL positive, HER2 negative    Carotid artery stenosis     Cervical cancer     Colon polyp 08/20/2021    Rectum: hyperplastic polyp    Diverticulosis     Fibromuscular dysplasia     dx 2 yrs ago    H/O bronchitis     Hx of radiation therapy     Hyperlipidemia     Hypertension     Loose stools     Osteoporosis     Positive colorectal cancer screening using Cologuard test     Psoriasis        Past Surgical History:   Procedure Laterality Date    BREAST BIOPSY Right 03/09/2022    US guided mammotome vaccum assisted right breast biopsy-Dr. Julián Palacios, [facility]    BREAST LUMPECTOMY      BREAST LUMPECTOMY WITH SENTINEL NODE BIOPSY Right 04/28/2022    Procedure: RIGHT BREAST WIRE LOCALIZED LUMPECTOMY WITH SENTINEL NODE BIOPSY;  Surgeon: Allie Bah MD;  [place] [facility] [place] [facility] [place]  Service: General;  Laterality: Right;    COLONOSCOPY N/A 08/20/2021    Procedure: COLONOSCOPY INTO CECUM AND TI WITH COLD BX POLYPECTOMIES;  Surgeon: Catia Kate MD;  Location: [facility] ENDOSCOPY;  Service: Gastroenterology;  Laterality: N/A;  PRE: POSITIVE COLOGUARD  POST: DIVERTICULOSIS, POLYPS, HEMORRHOIDS    IL TEAEC W/PATCH GRF CAROTID VERTB SUBCLAV NECK  INC Left 09/14/2016    Procedure: LT CAROTID ENDARTERECTOMY WITH INTRA OPERATIVE CAROTID DUPLEX SCAN;  Surgeon: Arnulfo Hernandez MD;  Location: [facility] OR;  Service: Vascular    SUBTOTAL HYSTERECTOMY Bilateral 09/1987    done at [facility], ovaries still in tact       No Known Allergies    Physical Exam:   Vital Signs   Temp:  [98.8 °F (37.1 °C)-101.7 °F (38.7 °C)] 99.7 °F (37.6 °C)  Heart Rate:  [] 91  Resp:  [16-18] 18  BP: (119-174)/(61-92) 120/61    GENERAL: Awake and alert, sickly  HEENT: Oropharynx poor dention. Hearing is grossly normal.   EYES: . No conjunctival injection. No lid lag.   LUNGS:normal respiratory effort.   SKIN: no cutaneous eruptions in exposed areas  PSYCHIATRIC: Appropriate mood, affect, insight, and judgment.     Results Review:  White count 13.1, hemoglobin 11, platelets 144  Creatinine 0.73, liver function test normal  Urinalysis with trace ketones and protein    Microbiology:  9/13 respiratory pathogen panel negative  9/13 blood cultures pending  9/13 CSF studies glucose 84, protein 74, total nucleated cells 31, 50% lymphocytes, 41% neutrophils, meningitis encephalitis panel negative, culture no growth    Radiology: CXR, independently interpreted: no pna      A/p  Sepsis, aseptic meningitis  2.   Abnormal head CT    We will check MRI brain given abnormal CT head (brain abscess from poor dentition or incidental finding or mets from breast cancer)  LP results most c/w viral/ aseptic meningitis. She has had some mosquito bites so will add on [place] (d/w lab)  Cont rocephin 2g IV q12 and vanc for auc 400-600  Vanc level ahead of 0330 dose    Thank you for this consult.  We will continue to follow along and tailor antibiotics as the patient's clinical course evolves.

## 2023-09-14 NOTE — PROGRESS NOTES
"[facility] Clinical Pharmacy Services: Vancomycin Pharmacokinetic Initial Consult Note    Arline Zaragoza is a 68 y.o. female who is on day 1 of pharmacy to dose vancomycin.    Indication: CNS Infection  Consulting Provider: Dr. Melton  Planned Duration of Therapy: 5 days  Loading Dose Ordered or Given: 1250 mg on 9/13 at 2294  Culture/Source: 9/13 Blood cultures in process  9/13 meningitis/encephalitis panel negative  Target: -600 mg/L.hr   Other Antimicrobials: ceftriaxone 2 g iv every 12 hours, ampicillin 2 g iv every 4 hours    Vitals/Labs  Ht: 166.4 cm (65.5\"); Wt: 56.7 kg (125 lb)  Temp Readings from Last 1 Encounters:   09/13/23 98.8 °F (37.1 °C) (Oral)    Estimated Creatinine Clearance: 54.9 mL/min (by C-G formula based on SCr of 0.89 mg/dL).     Results from last 7 days   Lab Units 09/13/23  1633   CREATININE mg/dL 0.89   WBC 10*3/mm3 13.31*     Assessment/Plan:    Vancomycin Dose:   1000 mg IV every  18  hours  Predictive AUC level for the dose ordered is 534 mg/L.hr, which is within the target of 400-600 mg/L.hr  Vanc Trough has been ordered for 9/15 at 0330     Pharmacy will follow patient's kidney function and will adjust doses and obtain levels as necessary. Thank you for involving pharmacy in this patient's care. Please contact pharmacy with any questions or concerns.                           Juaquin Lawson III [facility]  Clinical Pharmacist    "

## 2023-09-15 ENCOUNTER — APPOINTMENT (OUTPATIENT)
Dept: MRI IMAGING | Facility: HOSPITAL | Age: 68
DRG: 871 | End: 2023-09-15
Payer: MEDICARE

## 2023-09-15 LAB
ALBUMIN SERPL-MCNC: 3.5 G/DL (ref 3.5–5.2)
ALBUMIN/GLOB SERPL: 1.5 G/DL
ALP SERPL-CCNC: 49 U/L (ref 39–117)
ALT SERPL W P-5'-P-CCNC: 9 U/L (ref 1–33)
ANION GAP SERPL CALCULATED.3IONS-SCNC: 12.7 MMOL/L (ref 5–15)
AST SERPL-CCNC: 27 U/L (ref 1–32)
BASOPHILS # BLD AUTO: 0.02 10*3/MM3 (ref 0–0.2)
BASOPHILS NFR BLD AUTO: 0.2 % (ref 0–1.5)
BILIRUB SERPL-MCNC: 0.4 MG/DL (ref 0–1.2)
BUN SERPL-MCNC: 9 MG/DL (ref 8–23)
BUN/CREAT SERPL: 12.3 (ref 7–25)
CALCIUM SPEC-SCNC: 8.4 MG/DL (ref 8.6–10.5)
CHLORIDE SERPL-SCNC: 99 MMOL/L (ref 98–107)
CO2 SERPL-SCNC: 23.3 MMOL/L (ref 22–29)
CREAT SERPL-MCNC: 0.73 MG/DL (ref 0.57–1)
DEPRECATED RDW RBC AUTO: 43.5 FL (ref 37–54)
EGFRCR SERPLBLD CKD-EPI 2021: 90.3 ML/MIN/1.73
EOSINOPHIL # BLD AUTO: 0 10*3/MM3 (ref 0–0.4)
EOSINOPHIL NFR BLD AUTO: 0 % (ref 0.3–6.2)
ERYTHROCYTE [DISTWIDTH] IN BLOOD BY AUTOMATED COUNT: 12.6 % (ref 12.3–15.4)
GLOBULIN UR ELPH-MCNC: 2.4 GM/DL
GLUCOSE SERPL-MCNC: 104 MG/DL (ref 65–99)
HCT VFR BLD AUTO: 31.4 % (ref 34–46.6)
HGB BLD-MCNC: 10.5 G/DL (ref 12–15.9)
IMM GRANULOCYTES # BLD AUTO: 0.05 10*3/MM3 (ref 0–0.05)
IMM GRANULOCYTES NFR BLD AUTO: 0.4 % (ref 0–0.5)
LYMPHOCYTES # BLD AUTO: 1.74 10*3/MM3 (ref 0.7–3.1)
LYMPHOCYTES NFR BLD AUTO: 15.1 % (ref 19.6–45.3)
MAGNESIUM SERPL-MCNC: 2.1 MG/DL (ref 1.6–2.4)
MCH RBC QN AUTO: 31.6 PG (ref 26.6–33)
MCHC RBC AUTO-ENTMCNC: 33.4 G/DL (ref 31.5–35.7)
MCV RBC AUTO: 94.6 FL (ref 79–97)
MONOCYTES # BLD AUTO: 1.18 10*3/MM3 (ref 0.1–0.9)
MONOCYTES NFR BLD AUTO: 10.3 % (ref 5–12)
NEUTROPHILS NFR BLD AUTO: 74 % (ref 42.7–76)
NEUTROPHILS NFR BLD AUTO: 8.52 10*3/MM3 (ref 1.7–7)
NRBC BLD AUTO-RTO: 0 /100 WBC (ref 0–0.2)
PLATELET # BLD AUTO: 151 10*3/MM3 (ref 140–450)
PMV BLD AUTO: 11.7 FL (ref 6–12)
POTASSIUM SERPL-SCNC: 4 MMOL/L (ref 3.5–5.2)
PROCALCITONIN SERPL-MCNC: 0.06 NG/ML (ref 0–0.25)
PROT SERPL-MCNC: 5.9 G/DL (ref 6–8.5)
RBC # BLD AUTO: 3.32 10*6/MM3 (ref 3.77–5.28)
SODIUM SERPL-SCNC: 135 MMOL/L (ref 136–145)
VANCOMYCIN TROUGH SERPL-MCNC: 6.2 MCG/ML (ref 5–20)
WBC NRBC COR # BLD: 11.51 10*3/MM3 (ref 3.4–10.8)

## 2023-09-15 PROCEDURE — 85025 COMPLETE CBC W/AUTO DIFF WBC: CPT | Performed by: INTERNAL MEDICINE

## 2023-09-15 PROCEDURE — 80053 COMPREHEN METABOLIC PANEL: CPT | Performed by: INTERNAL MEDICINE

## 2023-09-15 PROCEDURE — 25010000002 PROCHLORPERAZINE 10 MG/2ML SOLUTION: Performed by: INTERNAL MEDICINE

## 2023-09-15 PROCEDURE — A9577 INJ MULTIHANCE: HCPCS | Performed by: INTERNAL MEDICINE

## 2023-09-15 PROCEDURE — 0 GADOBENATE DIMEGLUMINE 529 MG/ML SOLUTION: Performed by: INTERNAL MEDICINE

## 2023-09-15 PROCEDURE — 70553 MRI BRAIN STEM W/O & W/DYE: CPT

## 2023-09-15 PROCEDURE — 94799 UNLISTED PULMONARY SVC/PX: CPT

## 2023-09-15 PROCEDURE — 25010000002 CEFTRIAXONE PER 250 MG: Performed by: INTERNAL MEDICINE

## 2023-09-15 PROCEDURE — 83735 ASSAY OF MAGNESIUM: CPT | Performed by: INTERNAL MEDICINE

## 2023-09-15 PROCEDURE — 99232 SBSQ HOSP IP/OBS MODERATE 35: CPT | Performed by: INTERNAL MEDICINE

## 2023-09-15 PROCEDURE — 25010000002 DIPHENHYDRAMINE PER 50 MG: Performed by: INTERNAL MEDICINE

## 2023-09-15 PROCEDURE — 80202 ASSAY OF VANCOMYCIN: CPT | Performed by: INTERNAL MEDICINE

## 2023-09-15 PROCEDURE — 25010000002 VANCOMYCIN 10 G RECONSTITUTED SOLUTION: Performed by: INTERNAL MEDICINE

## 2023-09-15 PROCEDURE — 84145 PROCALCITONIN (PCT): CPT | Performed by: INTERNAL MEDICINE

## 2023-09-15 PROCEDURE — 94761 N-INVAS EAR/PLS OXIMETRY MLT: CPT

## 2023-09-15 PROCEDURE — 94664 DEMO&/EVAL PT USE INHALER: CPT

## 2023-09-15 RX ORDER — PROCHLORPERAZINE EDISYLATE 5 MG/ML
5 INJECTION INTRAMUSCULAR; INTRAVENOUS ONCE
Status: COMPLETED | OUTPATIENT
Start: 2023-09-15 | End: 2023-09-15

## 2023-09-15 RX ORDER — BUTALBITAL, ACETAMINOPHEN AND CAFFEINE 50; 325; 40 MG/1; MG/1; MG/1
2 TABLET ORAL EVERY 4 HOURS PRN
Status: DISCONTINUED | OUTPATIENT
Start: 2023-09-15 | End: 2023-09-16 | Stop reason: HOSPADM

## 2023-09-15 RX ORDER — DIPHENHYDRAMINE HYDROCHLORIDE 50 MG/ML
12.5 INJECTION INTRAMUSCULAR; INTRAVENOUS ONCE
Status: COMPLETED | OUTPATIENT
Start: 2023-09-15 | End: 2023-09-15

## 2023-09-15 RX ADMIN — VANCOMYCIN HYDROCHLORIDE 1250 MG: 10 INJECTION, POWDER, LYOPHILIZED, FOR SOLUTION INTRAVENOUS at 22:33

## 2023-09-15 RX ADMIN — SENNOSIDES AND DOCUSATE SODIUM 2 TABLET: 50; 8.6 TABLET ORAL at 20:33

## 2023-09-15 RX ADMIN — ACETAMINOPHEN 650 MG: 325 TABLET, FILM COATED ORAL at 09:18

## 2023-09-15 RX ADMIN — ASPIRIN 81 MG: 81 TABLET, COATED ORAL at 09:18

## 2023-09-15 RX ADMIN — CEFTRIAXONE 2000 MG: 2 INJECTION, POWDER, FOR SOLUTION INTRAMUSCULAR; INTRAVENOUS at 20:33

## 2023-09-15 RX ADMIN — LOSARTAN POTASSIUM 100 MG: 100 TABLET, FILM COATED ORAL at 09:20

## 2023-09-15 RX ADMIN — SODIUM CHLORIDE 75 ML/HR: 9 INJECTION, SOLUTION INTRAVENOUS at 04:43

## 2023-09-15 RX ADMIN — ACETAMINOPHEN 650 MG: 325 TABLET, FILM COATED ORAL at 21:35

## 2023-09-15 RX ADMIN — VANCOMYCIN HYDROCHLORIDE 1250 MG: 10 INJECTION, POWDER, LYOPHILIZED, FOR SOLUTION INTRAVENOUS at 04:43

## 2023-09-15 RX ADMIN — PROCHLORPERAZINE EDISYLATE 5 MG: 5 INJECTION INTRAMUSCULAR; INTRAVENOUS at 16:24

## 2023-09-15 RX ADMIN — ATORVASTATIN CALCIUM 20 MG: 20 TABLET, FILM COATED ORAL at 20:33

## 2023-09-15 RX ADMIN — BUDESONIDE AND FORMOTEROL FUMARATE DIHYDRATE 2 PUFF: 160; 4.5 AEROSOL RESPIRATORY (INHALATION) at 20:43

## 2023-09-15 RX ADMIN — GADOBENATE DIMEGLUMINE 11 ML: 529 INJECTION, SOLUTION INTRAVENOUS at 10:20

## 2023-09-15 RX ADMIN — DIPHENHYDRAMINE HYDROCHLORIDE 12.5 MG: 50 INJECTION, SOLUTION INTRAMUSCULAR; INTRAVENOUS at 16:24

## 2023-09-15 RX ADMIN — BUDESONIDE AND FORMOTEROL FUMARATE DIHYDRATE 2 PUFF: 160; 4.5 AEROSOL RESPIRATORY (INHALATION) at 09:21

## 2023-09-15 RX ADMIN — CEFTRIAXONE 2000 MG: 2 INJECTION, POWDER, FOR SOLUTION INTRAMUSCULAR; INTRAVENOUS at 09:18

## 2023-09-15 NOTE — PROGRESS NOTES
Name: Arline Zaragoza ADMIT: 2023   : 1955  PCP: Allie Farha APRN    MRN: [id number] LOS: 2 days   AGE/SEX: 68 y.o. female  ROOM: [facility]   Subjective   Chief Complaint   Patient presents with    Vomiting    Headache     68-year-old with history of breast cancer, fibromuscular dysplasia, hypertension, dyslipidemia, psoriasis, osteoporosis and other medical issues.  She presents with 1 week history of fevers, chills, nausea and headache.  She came to [facility] emergency room and underwent lumbar puncture and was started on antibiotics for potential meningitis.    On abx  +fever still present this AM to 103  Some HA at times  Had MRI    ROS  + f/c  No n/v  No cp/palp  No soa/cough    Objective   Vital Signs  Temp:  [99 °F (37.2 °C)-103.1 °F (39.5 °C)] 99 °F (37.2 °C)  Heart Rate:  [73-99] 73  Resp:  [18-20] 18  BP: (125-152)/(53-81) 138/65  SpO2:  [97 %-100 %] 100 %  on   ;   Device (Oxygen Therapy): room air  Body mass index is 20.48 kg/m².    Physical Exam  Constitutional:       General: She is not in acute distress.     Appearance: She is ill-appearing.   HENT:      Head: Normocephalic and atraumatic.   Eyes:      General: No scleral icterus.  Cardiovascular:      Rate and Rhythm: Regular rhythm.      Heart sounds: Normal heart sounds.   Pulmonary:      Effort: Pulmonary effort is normal. No respiratory distress.   Abdominal:      General: There is no distension.      Palpations: Abdomen is soft.   Musculoskeletal:      Cervical back: Neck supple.   Skin:     Coloration: Skin is pale.   Neurological:      Mental Status: She is alert.   Psychiatric:         Behavior: Behavior normal.       Results Review:       I reviewed the patient's new clinical results.  Results from last 7 days   Lab Units 09/15/23  0324 23  0605 23  1633   WBC 10*3/mm3 11.51* 13.09* 13.31*   HEMOGLOBIN g/dL 10.5* 11.3* 13.4   PLATELETS 10*3/mm3 151 144 175       Results from last 7 days   Lab Units  09/15/23  0324 09/14/23  0605 09/13/23  1633   SODIUM mmol/L 135* 135* 131*   POTASSIUM mmol/L 4.0 3.7 4.1   CHLORIDE mmol/L 99 102 96*   CO2 mmol/L 23.3 21.6* 19.8*   BUN mg/dL 9 17 15   CREATININE mg/dL 0.73 0.73 0.89   GLUCOSE mg/dL 104* 106* 148*     Estimated Creatinine Clearance: 66.9 mL/min (by C-G formula based on SCr of 0.73 mg/dL).  Results from last 7 days   Lab Units 09/15/23  0324 09/13/23  1633   ALBUMIN g/dL 3.5 4.6   BILIRUBIN mg/dL 0.4 0.7   ALK PHOS U/L 49 73   AST (SGOT) U/L 27 23   ALT (SGPT) U/L 9 13       Results from last 7 days   Lab Units 09/15/23  0324 09/14/23  0605 09/13/23  1633   CALCIUM mg/dL 8.4* 8.4* 9.0   ALBUMIN g/dL 3.5  --  4.6   MAGNESIUM mg/dL 2.1  --   --        Results from last 7 days   Lab Units 09/15/23  0324 09/13/23  2222 09/13/23  1712   PROCALCITONIN ng/mL 0.06  --   --    LACTATE mmol/L  --  1.0 2.9*         Coag     HbA1C No results found for: HGBA1C  Infection   Results from last 7 days   Lab Units 09/15/23  0324 09/13/23  1723 09/13/23  1712   BLOODCX   --  No growth at 24 hours No growth at 24 hours   PROCALCITONIN ng/mL 0.06  --   --      Radiology(recent) CT Head Without Contrast    Result Date: 9/13/2023   There are 2 subtle areas of increased density within the left aspect of the body of the corpus callosum that are of uncertain precise etiology and significance. However, I cannot exclude the possibility of a mass or an infectious abnormality at these sites. I strongly recommend further evaluation with an MRI of the brain with and without the use of IV contrast.  These findings and recommendations were directly discussed with Dr. Karlos Angel on 09/13/2023 at approximately 6:54 PM. .  Radiation dose reduction techniques were utilized, including automated exposure control and exposure modulation based on body size.    This report was finalized on 9/13/2023 7:21 PM by Dr. Pastor Loco M.D.      MRI Brain With & Without Contrast    Result Date: 9/15/2023   On the  previous head CT, there were subtle areas of increased density in the region of the left aspect of the mid body of the corpus callosum that were felt to be of uncertain precise etiology. No correlating abnormality is seen on the MRI examination and therefore these areas are felt to have represented artifact on the prior study.  Note is made of an abnormal flow void within the left internal carotid artery and this vessel was seen to be occluded on the prior CT angiogram of the neck dated 10/30/2022.  This report was finalized on 9/15/2023 1:18 PM by Dr. Pastor Loco M.D.      XR Chest 1 View    Result Date: 9/13/2023  Mild pulmonary emphysema. No evidence for active disease in the chest.   This report was finalized on 9/13/2023 5:40 PM by Dr. Howard Kimball M.D.     No results found for: TROPONINT, TROPONINI, BNP  No components found for: TSH;2    aspirin, 81 mg, Oral, Daily  atorvastatin, 20 mg, Oral, Nightly  budesonide-formoterol, 2 puff, Inhalation, BID - RT  cefTRIAXone, 2,000 mg, Intravenous, Q12H  losartan, 100 mg, Oral, Q24H  senna-docusate sodium, 2 tablet, Oral, BID  vancomycin, 1,250 mg, Intravenous, Q18H      Pharmacy to dose vancomycin,   sodium chloride, 75 mL/hr, Last Rate: 75 mL/hr (09/15/23 0443)    Diet: Regular/House Diet; Texture: Regular Texture (IDDSI 7); Fluid Consistency: Thin (IDDSI 0)      Assessment & Plan      Active Hospital Problems    Diagnosis  POA    **Suspected infectious meningitis [R29.818]  Yes    Hyponatremia [E87.1]  Unknown    Primary hypertension [I10]  Yes    Malignant neoplasm of female breast [C50.919]  Yes    Osteoporosis [M81.0]  Yes    Left carotid stenosis [I65.22]  Yes    CAD (coronary artery disease) [I25.10]  Yes    Hyperlipidemia [E78.5]  Yes      Resolved Hospital Problems   No resolved problems to display.     68-year-old with history of breast cancer, fibromuscular dysplasia, hypertension, dyslipidemia, psoriasis, osteoporosis and other medical issues.  She  presents with 1 week history of fevers, chills, nausea and headache.  She came to [facility] emergency room and underwent lumbar puncture and was started on antibiotics for potential meningitis.    As needed agents for symptom control. Agents added PRN for HA. Will also give IV benadryl and compazine x 1.   Currently on IV antibiotics.  Thanks to infectious disease who is seen and more likely to be a septic meningitis but we will follow-up on cultures. Denies any other specific symptoms of infection.   MRI brain normal without abscess or malignancy  Continue agents for hypertension and dyslipidemia  Monitor sodium, improved with IVFs    Dispo- potential discharge to home this weekend if fevers improved and cultures negative.     DW staff  Reviewed records       Soniod Jean MD  [facility]  09/15/23  13:41 EDT

## 2023-09-15 NOTE — PROGRESS NOTES
[facility] Clinical Pharmacy Services: Vancomycin Level Monitoring Note    Arline Zaragoza is a 68 y.o. female who is on day 2/5 of pharmacy to dose vancomycin for CNS Infection.    Estimated Creatinine Clearance: 66.9 mL/min (by C-G formula based on SCr of 0.73 mg/dL).    Current Vanc Dose: 1000 mg IV every  18  hours  Results from last 7 days   Lab Units 09/15/23  0324   VANCOMYCIN TR mcg/mL 6.20   Predicted AUC at current dose:386 mg/L.hr  Will increase dose to vancomycin 1250 mg IV q18h to provide a predicated AUC of 459 mg/L.hr  Next Level Date and Time: Vanc Trough on 9/16 @ 1530    Pharmacy is continuing to monitor and will adjust as needed.    Kashif Rios [facility]  Clinical Pharmacist

## 2023-09-15 NOTE — PROGRESS NOTES
LOS: 2 days     Chief Complaint: Fever    Interval History: Febrile, headache persists.  States overall she feels poorly.  Tolerating antibiotics without vomiting diarrhea or rash.  No shortness of breath or cough    Vital Signs  Temp:  [99.1 °F (37.3 °C)-102.9 °F (39.4 °C)] 99.9 °F (37.7 °C)  Heart Rate:  [] 87  Resp:  [18-20] 18  BP: (125-152)/(58-81) 152/75    Physical Exam:  General: In no acute distress  Cardiovascular: RRR, no LE edema   Respiratory: bibasilar crackles   GI: Soft, NT/ND, + bowel sounds bilaterally  Skin: No rashes     Antibiotics:  Ceftriaxone 2 g IV every 24 hours  Vancomycin dosing per pharmacy     Results Review:    Lab Results   Component Value Date    WBC 11.51 (H) 09/15/2023    HGB 10.5 (L) 09/15/2023    HCT 31.4 (L) 09/15/2023    MCV 94.6 09/15/2023     09/15/2023     Lab Results   Component Value Date    GLUCOSE 104 (H) 09/15/2023    BUN 9 09/15/2023    CREATININE 0.73 09/15/2023    EGFRIFNONA 85 05/13/2021    EGFRIFAFRI  09/15/2016      Comment:      <15 Indicative of kidney failure.    BCR 12.3 09/15/2023    CO2 23.3 09/15/2023    CALCIUM 8.4 (L) 09/15/2023    ALBUMIN 3.5 09/15/2023    AST 27 09/15/2023    ALT 9 09/15/2023     9/13 CSF studies glucose 84, protein 74, total nucleated cells 31, 50% lymphocytes, 41% neutrophils, meningitis encephalitis panel negative, culture no growth     Microbiology:  9/13 CSF cx NGTD  9/13 meningitis encephalitis panel negative9/13 BCx NGTD x 2  9/13 RVP neg    Assessment & Plan   Sepsis, aseptic meningitis  2.   Abnormal head CT    Fevers persist.  White blood cell count has decreased.  CSF cultures remain negative to date.  Awaiting MRI of the brain.  Continue empiric vancomycin dosing per pharmacy and ceftriaxone 2 g IV every 12 hours while awaiting culture data.    Addendum MRI of the brain without any findings of abscess or other lesions.  CT findings were most likely an artifact.

## 2023-09-15 NOTE — NURSING NOTE
[place] virus specimen has been collected and sent out lab mai said it would take a couple days for results

## 2023-09-15 NOTE — CASE MANAGEMENT/SOCIAL WORK
Discharge Planning Assessment  [facility]     Patient Name: Arline Zaragoza  MRN: [id number]  Today's Date: 9/15/2023    Admit Date: 9/13/2023    Plan: Home   Discharge Needs Assessment       Row Name 09/15/23 1311       Living Environment    People in Home alone    Current Living Arrangements home    Potentially Unsafe Housing Conditions none    Primary Care Provided by self    Provides Primary Care For no one    Family Caregiver if Needed none    Quality of Family Relationships helpful;involved;supportive    Able to Return to Prior Arrangements yes       Resource/Environmental Concerns    Resource/Environmental Concerns none       Transition Planning    Patient/Family Anticipates Transition to home    Patient/Family Anticipated Services at Transition none    Transportation Anticipated family or friend will provide       Discharge Needs Assessment    Readmission Within the Last 30 Days no previous admission in last 30 days    Equipment Currently Used at Home none    Concerns to be Addressed no discharge needs identified;denies needs/concerns at this time    Anticipated Changes Related to Illness none    Equipment Needed After Discharge none    Provided Post Acute Provider List? N/A    Provided Post Acute Provider Quality & Resource List? N/A                   Discharge Plan       Row Name 09/15/23 1310       Plan    Plan Home    Patient/Family in Agreement with Plan yes    Plan Comments CCP contacted the patient. Introduced self and explained role of CCP. Patient confirmed the information on her face sheet is accurate. Patients PCP is Allie Farah. Patients’ pharmacy is [facility] on [address]. Patient lives alone at home. She uses no DME and is independent in ADLs.  Patient has never had HH or been to a SNF. Patient lives in a third-floor apartment with 14 steps with hand rails. Patient plans to discharge home. Family to transport.                  Continued Care and Services - Admitted Since 9/13/2023     Coordination has not been started for this encounter.       Expected Discharge Date and Time       Expected Discharge Date Expected Discharge Time    Sep 18, 2023            Demographic Summary       Row Name 09/15/23 1311       General Information    Admission Type inpatient    Arrived From emergency department    Referral Source admission list    Reason for Consult discharge planning    Preferred Language English                   Functional Status       Row Name 09/15/23 1311       Functional Status    Usual Activity Tolerance good    Current Activity Tolerance good       Functional Status, IADL    Medications independent    Meal Preparation independent    Housekeeping independent    Laundry independent    Shopping independent       Mental Status    General Appearance WDL WDL       Mental Status Summary    Recent Changes in Mental Status/Cognitive Functioning no changes       Employment/    Employment Status retired                   Psychosocial    No documentation.                  Abuse/Neglect    No documentation.                  Legal    No documentation.                  Substance Abuse    No documentation.                  Patient Forms    No documentation.                     TIARA Strange

## 2023-09-16 ENCOUNTER — READMISSION MANAGEMENT (OUTPATIENT)
Dept: CALL CENTER | Facility: HOSPITAL | Age: 68
End: 2023-09-16
Payer: MEDICARE

## 2023-09-16 VITALS
SYSTOLIC BLOOD PRESSURE: 164 MMHG | HEIGHT: 66 IN | HEART RATE: 83 BPM | OXYGEN SATURATION: 100 % | BODY MASS INDEX: 20.09 KG/M2 | WEIGHT: 125 LBS | DIASTOLIC BLOOD PRESSURE: 81 MMHG | TEMPERATURE: 98.4 F | RESPIRATION RATE: 18 BRPM

## 2023-09-16 LAB
BACTERIA SPEC AEROBE CULT: NORMAL
GRAM STN SPEC: NORMAL
VANCOMYCIN TROUGH SERPL-MCNC: 12.4 MCG/ML (ref 5–20)

## 2023-09-16 PROCEDURE — 25010000002 PROCHLORPERAZINE 10 MG/2ML SOLUTION: Performed by: INTERNAL MEDICINE

## 2023-09-16 PROCEDURE — 94799 UNLISTED PULMONARY SVC/PX: CPT

## 2023-09-16 PROCEDURE — 80202 ASSAY OF VANCOMYCIN: CPT | Performed by: INTERNAL MEDICINE

## 2023-09-16 PROCEDURE — 25010000002 CEFTRIAXONE PER 250 MG: Performed by: INTERNAL MEDICINE

## 2023-09-16 PROCEDURE — 94664 DEMO&/EVAL PT USE INHALER: CPT

## 2023-09-16 PROCEDURE — 25010000002 DIPHENHYDRAMINE PER 50 MG: Performed by: INTERNAL MEDICINE

## 2023-09-16 PROCEDURE — 25010000002 VANCOMYCIN 10 G RECONSTITUTED SOLUTION: Performed by: INTERNAL MEDICINE

## 2023-09-16 RX ORDER — PROCHLORPERAZINE MALEATE 10 MG
10 TABLET ORAL EVERY 6 HOURS PRN
Qty: 10 TABLET | Refills: 0 | Status: SHIPPED | OUTPATIENT
Start: 2023-09-16 | End: 2024-01-02

## 2023-09-16 RX ORDER — DIPHENHYDRAMINE HYDROCHLORIDE 50 MG/ML
12.5 INJECTION INTRAMUSCULAR; INTRAVENOUS ONCE
Status: COMPLETED | OUTPATIENT
Start: 2023-09-16 | End: 2023-09-16

## 2023-09-16 RX ORDER — PROCHLORPERAZINE EDISYLATE 5 MG/ML
5 INJECTION INTRAMUSCULAR; INTRAVENOUS ONCE
Status: COMPLETED | OUTPATIENT
Start: 2023-09-16 | End: 2023-09-16

## 2023-09-16 RX ADMIN — ASPIRIN 81 MG: 81 TABLET, COATED ORAL at 08:04

## 2023-09-16 RX ADMIN — ACETAMINOPHEN 650 MG: 325 TABLET, FILM COATED ORAL at 08:04

## 2023-09-16 RX ADMIN — DIPHENHYDRAMINE HYDROCHLORIDE 12.5 MG: 50 INJECTION, SOLUTION INTRAMUSCULAR; INTRAVENOUS at 15:16

## 2023-09-16 RX ADMIN — SODIUM CHLORIDE 75 ML/HR: 9 INJECTION, SOLUTION INTRAVENOUS at 01:19

## 2023-09-16 RX ADMIN — CEFTRIAXONE 2000 MG: 2 INJECTION, POWDER, FOR SOLUTION INTRAMUSCULAR; INTRAVENOUS at 08:35

## 2023-09-16 RX ADMIN — BUDESONIDE AND FORMOTEROL FUMARATE DIHYDRATE 2 PUFF: 160; 4.5 AEROSOL RESPIRATORY (INHALATION) at 08:20

## 2023-09-16 RX ADMIN — VANCOMYCIN HYDROCHLORIDE 1250 MG: 10 INJECTION, POWDER, LYOPHILIZED, FOR SOLUTION INTRAVENOUS at 15:34

## 2023-09-16 RX ADMIN — LOSARTAN POTASSIUM 100 MG: 100 TABLET, FILM COATED ORAL at 08:04

## 2023-09-16 RX ADMIN — PROCHLORPERAZINE EDISYLATE 5 MG: 5 INJECTION INTRAMUSCULAR; INTRAVENOUS at 15:16

## 2023-09-16 NOTE — DISCHARGE SUMMARY
NAME: Arline Zaragoza ADMIT: 2023   : 1955  PCP: Allie Farah APRN    MRN: [id number] LOS: 3 days   AGE/SEX: 68 y.o. female  ROOM:      Date of Admission:  2023  Date of Discharge:  2023    PCP: Allie Farah APRN    CHIEF COMPLAINT  Vomiting and Headache      DISCHARGE DIAGNOSIS  Active Hospital Problems    Diagnosis  POA    **Suspected infectious meningitis [R29.818]  Yes    Hyponatremia [E87.1]  Unknown    Primary hypertension [I10]  Yes    Malignant neoplasm of female breast [C50.919]  Yes    Osteoporosis [M81.0]  Yes    Left carotid stenosis [I65.22]  Yes    CAD (coronary artery disease) [I25.10]  Yes    Hyperlipidemia [E78.5]  Yes      Resolved Hospital Problems   No resolved problems to display.       SECONDARY DIAGNOSES  Past Medical History:   Diagnosis Date    Back pain      had ruptured disc no surgery so prn c/o pain    Breast cancer 2022    Right breast invasive ductal adenocarcinoma, ER/GA positive, HER2 negative    Carotid artery stenosis     Cervical cancer     Colon polyp 2021    Rectum: hyperplastic polyp    Diverticulosis     Fibromuscular dysplasia     dx 2 yrs ago    H/O bronchitis     Hx of radiation therapy     Hyperlipidemia     Hypertension     Loose stools     Osteoporosis     Positive colorectal cancer screening using Cologuard test     Psoriasis        CONSULTS   ID    HOSPITAL COURSE  68-year-old with history of breast cancer, fibromuscular dysplasia, hypertension, dyslipidemia, psoriasis, osteoporosis and other medical issues.  She presents with 1 week history of fevers, chills, nausea and headache.  She came to [facility] emergency room and underwent lumbar puncture and was started on antibiotics and seen by ID. Cultures were negative now at 48+hours. Her fever is improved and no additional infectious symptoms besides the initial fevers as well as headache and nausea (no abdominal pain, no shortness of breath, no dysuria).  She feels better and wishes for discharge today with close outpatient follow up. MRI brain normal without abscess or malignancy. If she states feeling worse or persistent fevers she can seek medical attention       DIAGNOSTICS           Collected Updated Procedure Result Status    09/15/2023 0324 09/15/2023 0421 Vancomycin, Trough [[id number]]    Blood    Final result Component Value Units   Vancomycin Trough 6.20 mcg/mL          09/15/2023 0324 09/15/2023 0427 Comprehensive Metabolic Panel [[id number]]    (Abnormal)   Blood    Final result Component Value Units   Glucose 104 High  mg/dL   BUN 9 mg/dL   Creatinine 0.73 mg/dL   Sodium 135 Low  mmol/L   Potassium 4.0  mmol/L   Chloride 99 mmol/L   CO2 23.3 mmol/L   Calcium 8.4 Low  mg/dL   Total Protein 5.9 Low  g/dL   Albumin 3.5 g/dL   ALT (SGPT) 9 U/L   AST (SGOT) 27  U/L   Alkaline Phosphatase 49 U/L   Total Bilirubin 0.4 mg/dL   Globulin 2.4 gm/dL   A/G Ratio 1.5 g/dL   BUN/Creatinine Ratio 12.3    Anion Gap 12.7 mmol/L   eGFR 90.3 mL/min/1.73          09/15/2023 0324 09/15/2023 0427 Magnesium [[id number]]   Blood    Final result Component Value Units   Magnesium 2.1 mg/dL          09/15/2023 0324 09/15/2023 0359 CBC Auto Differential [[id number]]   (Abnormal)   Blood    Final result Component Value Units   WBC 11.51 High  10*3/mm3   RBC 3.32 Low  10*6/mm3   Hemoglobin 10.5 Low  g/dL   Hematocrit 31.4 Low  %   MCV 94.6 fL   MCH 31.6 pg   MCHC 33.4 g/dL   RDW 12.6 %   RDW-SD 43.5 fl   MPV 11.7 fL   Platelets 151 10*3/mm3   Neutrophil % 74.0 %   Lymphocyte % 15.1 Low  %   Monocyte % 10.3 %   Eosinophil % 0.0 Low  %   Basophil % 0.2 %   Immature Grans % 0.4 %   Neutrophils, Absolute 8.52 High  10*3/mm3   Lymphocytes, Absolute 1.74 10*3/mm3   Monocytes, Absolute 1.18 High  10*3/mm3   Eosinophils, Absolute 0.00 10*3/mm3   Basophils, Absolute 0.02 10*3/mm3   Immature Grans, Absolute 0.05 10*3/mm3   nRBC 0.0 /100 WBC          09/15/2023 0324 09/15/2023 1051 Procalcitonin  [[id number]]    Blood    Final result Component Value Units   Procalcitonin 0.06 ng/mL          09/14/2023 0605 09/14/2023 0649 Basic Metabolic Panel [[id number]]    (Abnormal)   Blood    Final result Component Value Units   Glucose 106 High  mg/dL   BUN 17 mg/dL   Creatinine 0.73 mg/dL   Sodium 135 Low  mmol/L   Potassium 3.7 mmol/L   Chloride 102 mmol/L   CO2 21.6 Low  mmol/L   Calcium 8.4 Low  mg/dL   BUN/Creatinine Ratio 23.3    Anion Gap 11.4 mmol/L   eGFR 90.3 mL/min/1.73          09/14/2023 0605 09/14/2023 0655 CBC Auto Differential [[id number]]   (Abnormal)   Blood    Final result Component Value Units   WBC 13.09 High  10*3/mm3   RBC 3.61 Low  10*6/mm3   Hemoglobin 11.3 Low  g/dL   Hematocrit 33.1 Low  %   MCV 91.7 fL   MCH 31.3 pg   MCHC 34.1 g/dL   RDW 13.0 %   RDW-SD 43.0 fl   MPV 11.4 fL   Platelets 144 10*3/mm3          09/14/2023 0605 09/14/2023 0655 Manual Differential [[id number]]   (Abnormal)   Blood    Final result Component Value Units   Neutrophil % 81.6 High  %   Lymphocyte % 8.2 Low  %   Monocyte % 10.2 %   Neutrophils Absolute 10.68 High  10*3/mm3   Lymphocytes Absolute 1.07 10*3/mm3   Monocytes Absolute 1.34 High  10*3/mm3   Polychromasia Slight/1+    WBC Morphology Normal    Giant Platelets Slight/1+           09/14/2023 0543 09/14/2023 0712 Urinalysis With Culture If Indicated - Urine, Clean Catch [[id number]]    (Abnormal)   Urine, Clean Catch    Final result Component Value   Color, UA Yellow   Appearance, UA Clear   pH, UA 5.5   Specific Gravity, UA >=1.030   Glucose, UA Negative   Ketones, UA Trace Abnormal    Bilirubin, UA Negative   Blood, UA Negative   Protein, UA Trace Abnormal    Leuk Esterase, UA Negative   Nitrite, UA Negative   Urobilinogen, UA 0.2 E.U./dL          09/13/2023 2222 09/13/2023 2254 STAT Lactic Acid, Reflex [[id number]]   Blood    Final result Component Value Units   Lactate 1.0 mmol/L          09/13/2023 1939 09/13/2023 2042 Protein, CSF - Cerebrospinal Fluid, Lumbar  Puncture [[id number]]   (Abnormal)   Cerebrospinal Fluid from Lumbar Puncture    Final result Component Value Units   Protein, Total (CSF) 73.5 High  mg/dL          09/13/2023 1939 09/13/2023 2042 Glucose, CSF - Cerebrospinal Fluid, Lumbar Puncture [[id number]]   (Abnormal)   Cerebrospinal Fluid from Lumbar Puncture    Final result Component Value Units   Glucose, CSF 84 High  mg/dL          09/13/2023 1939 09/16/2023 1339 Culture, CSF - Cerebrospinal Fluid, Lumbar Puncture [[id number]]   Cerebrospinal Fluid from Lumbar Puncture    Final result Component Value   CSF Culture No growth at 3 days   Gram Stain No WBCs or organisms seen             09/13/2023 1939 09/13/2023 2021 Cell Count, CSF - Cerebrospinal Fluid, Lumbar Puncture [[id number]]    (Abnormal)   Cerebrospinal Fluid from Lumbar Puncture    Final result Component Value Units   Color, CSF Colorless    Appearance, CSF Clear    RBC, CSF 1 High  /mm3   Nucleated Cells, CSF 31 High  /mm3   Tube Number, CSF 3    Method: UF 1000i Automated Method           09/13/2023 1939 09/13/2023 2037 Spinal fluid differential - Cerebrospinal Fluid, Lumbar Puncture [[id number]]   Cerebrospinal Fluid from Lumbar Puncture    Final result Component Value Units   Neutrophils, CSF 41 %   Lymphocytes, CSF 50 %   Monocytes, CSF 9 %          09/13/2023 1939 09/13/2023 2146 Meningitis / Encephalitis Panel, PCR - Cerebrospinal Fluid, Lumbar Puncture [[id number]]   Cerebrospinal Fluid from Lumbar Puncture    Final result Component Value   ESCHERICHIA COLI K1, PCR Not Detected   HAEMOPHILUS INFLUENZAE, PCR Not Detected   LISTERIA MONOCYTOGENES, PCR Not Detected   NEISSERIA MENINGITIDIS, PCR Not Detected   STREPTOCOCCUS AGALACTIAE, PCR Not Detected   STREPTOCOCCUS PNEUMONIAE, PCR Not Detected   CYTOMEGALOVIRUS (CMV), PCR Not Detected   ENTEROVIRUS, PCR Not Detected   HERPES SIMPLEX VIRUS 1 (HSV-1), PCR Not Detected   HERPES SIMPLEX VIRUS 2 (HSV-2), PCR Not Detected   HUMAN PARECHOVIRUS, PCR Not  Detected   VARICELLA ZOSTER VIRUS (VZV), PCR Not Detected   CRYPTOCOCCUS NEOFORMANS / GATTII, PCR Not Detected   HUMAN HERPES VIRUS 6 PCR Not Detected             09/13/2023 1939 09/15/2023 1304 West Nile Virus, CSF [[id number]]   Cerebrospinal Fluid from Lumbar Puncture    In process Component Value   No component results          09/13/2023 1723 09/15/2023 1731 Blood Culture - Blood, Hand, Right [[id number]]   Blood from Hand, Right    Preliminary result Component Value   Blood Culture No growth at 2 days P             09/13/2023 1712 09/15/2023 1731 Blood Culture - Blood, Arm, Left [[id number]]   Blood from Arm, Left    Preliminary result Component Value   Blood Culture No growth at 2 days P             09/13/2023 1712 09/13/2023 1751 Lactic Acid, Plasma [[id number]]   (Abnormal)   Blood from Arm, Left    Final result Component Value Units   Lactate 2.9 High Critical  mmol/L          09/13/2023 1633 09/13/2023 1707 Comprehensive Metabolic Panel [[id number]]    (Abnormal)   Blood from Arm, Right    Final result Component Value Units   Glucose 148 High  mg/dL   BUN 15 mg/dL   Creatinine 0.89 mg/dL   Sodium 131 Low  mmol/L   Potassium 4.1  mmol/L   Chloride 96 Low  mmol/L   CO2 19.8 Low  mmol/L   Calcium 9.0 mg/dL   Total Protein 7.2 g/dL   Albumin 4.6 g/dL   ALT (SGPT) 13 U/L   AST (SGOT) 23 U/L   Alkaline Phosphatase 73 U/L   Total Bilirubin 0.7 mg/dL   Globulin 2.6 gm/dL   A/G Ratio 1.8 g/dL   BUN/Creatinine Ratio 16.9    Anion Gap 15.2 High  mmol/L   eGFR 71.2 mL/min/1.73          09/13/2023 1633 09/13/2023 1811 Respiratory Panel PCR w/COVID-19(SARS-CoV-2) MIKE/ISHA/MARIA LUZ/PAD/COR/MAD/EMILIA In-House, NP Swab in UTM/[facility], 3-4 HR TAT - Swab, Nasopharynx [[id number]]    Swab from Nasopharynx    Final result Component Value   ADENOVIRUS, PCR Not Detected   Coronavirus 229E Not Detected   Coronavirus HKU1 Not Detected   Coronavirus NL63 Not Detected   Coronavirus OC43 Not Detected   COVID19 Not Detected   Human Metapneumovirus Not  Detected   Human Rhinovirus/Enterovirus Not Detected   Influenza A PCR Not Detected   Influenza B PCR Not Detected   Parainfluenza Virus 1 Not Detected   Parainfluenza Virus 2 Not Detected   Parainfluenza Virus 3 Not Detected   Parainfluenza Virus 4 Not Detected   RSV, PCR Not Detected   Bordetella pertussis pcr Not Detected   Bordetella parapertussis PCR Not Detected   Chlamydophila pneumoniae PCR Not Detected   Mycoplasma pneumo by PCR Not Detected             09/13/2023 1633 09/13/2023 1658 CBC Auto Differential [[id number]]   (Abnormal)   Blood from Arm, Right    Final result Component Value Units   WBC 13.31 High  10*3/mm3   RBC 4.19 10*6/mm3   Hemoglobin 13.4 g/dL   Hematocrit 38.6 %   MCV 92.1 fL   MCH 32.0 pg   MCHC 34.7 g/dL   RDW 13.2 %   RDW-SD 43.8 fl   MPV 11.9 fL   Platelets 175 10*3/mm3   nRBC 0.0 /100 WBC          09/13/2023 1633 09/13/2023 1750 Manual Differential [[id number]]   (Abnormal)   Blood from Arm, Right    Final result Component Value Units   Neutrophil % 88.8 High  %   Lymphocyte % 8.2 Low  %   Monocyte % 3.1 Low  %   Neutrophils Absolute 11.82 High  10*3/mm3   Lymphocytes Absolute 1.09 10*3/mm3   Monocytes Absolute 0.41 10*3/mm3   RBC Morphology Normal    WBC Morphology Normal    Platelet Morphology Normal             MRI Brain With & Without Contrast [[id number]] Campbell as Reviewed   Order Status: Completed Collected: 09/15/23 1308    Updated: 09/15/23 1321   Narrative:     MRI OF THE BRAIN WITH AND WITHOUT CONTRAST     CLINICAL HISTORY: Evaluate for brain abscess. Headache.     TECHNIQUE: MRI of the brain was obtained with sagittal pre and  postgadolinium T1, axial pre-gadolinium and postgadolinium, coronal  postgadolinium T1,  axial FLAIR, axial T2, axial susceptibility  weighted, and axial diffusion-weighted images.     COMPARISON: CT head dated 09/13/2023.     FINDINGS:     On the prior head CT dated 09/13/2023, there were subtle areas of  increased density in the region of the left  aspect of the mid body of  the corpus callosum that were of uncertain precise etiology and  significance. On the MRI examination, no correlating abnormality is seen  at the sites. These areas of increased density are therefore felt to  have represented artifact on the prior exam.     There is an abnormal flow void within the left internal carotid artery  and this vessel was seen to be occluded on the prior CT angiogram of the  neck dated 10/30/2022.     The ventricles, sulci, and cisterns are age-appropriate. There are no  abnormal foci of restricted diffusion. There are no abnormal foci of  susceptibility artifact. No abnormal foci of contrast enhancement are  appreciated.     There is a small amount of fluid signal intensity within the right  petrous apex that is nonspecific in nature but statistically likely  representative of an incidental mastoid effusion. Incidental fluid  signal intensity is seen within the articulation of the left occipital  condyle and the left lateral mass of C1 that is compatible with  arthritic fluid.      Impression:        On the previous head CT, there were subtle areas of increased density in  the region of the left aspect of the mid body of the corpus callosum  that were felt to be of uncertain precise etiology. No correlating  abnormality is seen on the MRI examination and therefore these areas are  felt to have represented artifact on the prior study.     Note is made of an abnormal flow void within the left internal carotid  artery and this vessel was seen to be occluded on the prior CT angiogram  of the neck dated 10/30/2022.     This report was finalized on 9/15/2023 1:18 PM by Dr. Pastor Loco M.D.       CT Head Without Contrast [[id number]] Campbell as Reviewed   Order Status: Completed Collected: 09/13/23 1913    Updated: 09/13/23 1924   Narrative:     CT HEAD WITHOUT CONTRAST     CLINICAL HISTORY: Headache and fever.     TECHNIQUE: CT scan of the head was obtained with 3 mm  axial soft tissue  and 2 mm axial bone algorithm algorithm images. No intravenous contrast  was administered. Sagittal and coronal reconstructions were obtained.     COMPARISON: CT angiogram of the head dated 10/30/2022.     FINDINGS:    There are subtle areas of increased density in the region of the left  aspect of the mid body of the corpus callosum. These are best  appreciated on axial image #29. The largest of these measures up to  approximately 9 x 10 mm in greatest axial dimensions. The precise  etiology and significance of these findings is uncertain although I  cannot exclude the possibility of a mass or infectious abnormality at  this site. I recommend further evaluation with an MRI of the brain with  and without the use of IV contrast.     Otherwise, the ventricles, sulci, and cisterns are age-appropriate. The  basal ganglia and thalami are unremarkable in appearance. The posterior  fossa structures are within normal limits. Atherosclerotic  calcifications are incidentally appreciated within the intracranial  vasculature.      Impression:        There are 2 subtle areas of increased density within the left aspect of  the body of the corpus callosum that are of uncertain precise etiology  and significance. However, I cannot exclude the possibility of a mass or  an infectious abnormality at these sites. I strongly recommend further  evaluation with an MRI of the brain with and without the use of IV  contrast.     These findings and recommendations were directly discussed with Dr. Karlos Angel on 09/13/2023 at approximately 6:54 PM. .     Radiation dose reduction techniques were utilized, including automated  exposure control and exposure modulation based on body size.           This report was finalized on 9/13/2023 7:21 PM by Dr. Pastor Loco M.D.       XR Chest 1 View [[id number]] Campbell as Reviewed   Order Status: Completed Collected: 09/13/23 1724    Updated: 09/13/23 1743   Narrative:     CHEST SINGLE  "VIEW     HISTORY: Nausea, vomiting, headache     COMPARISON: Two-view chest 09/07/2016, chest CT 02/17/2023.     FINDINGS: Cardiomediastinal silhouette is within normal limits. Lungs  appear clear and there is no evidence for pulmonary edema or pleural  effusion or infiltrate. Th       PHYSICAL EXAM  Objective:  Vital signs: (most recent): Blood pressure 164/81, pulse 83, temperature 98.4 °F (36.9 °C), resp. rate 18, height 166.4 cm (65.5\"), weight 56.7 kg (125 lb), SpO2 100 %, not currently breastfeeding.              Alert  nad  No resp distress  Soft, nt  RRR  Wishing for discharge today     CONDITION ON DISCHARGE  Stable.      DISCHARGE DISPOSITION   Home or Self Care      DISCHARGE MEDICATIONS       Your medication list        START taking these medications        Instructions Last Dose Given Next Dose Due   prochlorperazine 10 MG tablet  Commonly known as: COMPAZINE      Take 1 tablet by mouth Every 6 (Six) Hours As Needed for Nausea or Vomiting (or headache).              CONTINUE taking these medications        Instructions Last Dose Given Next Dose Due   albuterol sulfate  (90 Base) MCG/ACT inhaler  Commonly known as: PROVENTIL HFA;VENTOLIN HFA;PROAIR HFA      Inhale 2 puffs Every 4 (Four) Hours As Needed for Wheezing.       aspirin 81 MG EC tablet      Take 1 tablet by mouth Daily.       atorvastatin 20 MG tablet  Commonly known as: LIPITOR      Take 1 tablet by mouth Every Night.       budesonide-formoterol 160-4.5 MCG/ACT inhaler  Commonly known as: Symbicort      Inhale 2 puffs 2 (Two) Times a Day.       CALCIUM 500 PO      Take 1 tablet by mouth Daily.       irbesartan 300 MG tablet  Commonly known as: Avapro      Take 1 tablet by mouth Daily.       raloxifene 60 MG tablet  Commonly known as: EVISTA      Take 1 tablet by mouth Daily.                 Where to Get Your Medications        These medications were sent to [facility] [id number] - [place], [place] - [address] [place] [address] RD AT [facility] [address] ([address] - [phone]  - [phone] FX  [address], [facility] [address]      Phone: [phone]   prochlorperazine 10 MG tablet          Future Appointments   Date Time Provider Department Center   1/2/2024 10:30 AM Allie Farah APRN [facility]     Additional Instructions for the Follow-ups that You Need to Schedule       Discharge Follow-up with Specialty: PCP in 1 week, Oncology in 2 -3 weeks   As directed      Specialty: PCP in 1 week, Oncology in 2 -3 weeks               Follow-up Information       Allie Farah APRN .    Specialties: Internal Medicine, Nurse Practitioner  Contact information:  [address] [id number] [phone]                             TEST  RESULTS PENDING AT DISCHARGE  Pending Labs       Order Current Status    West Nile Virus, CSF In process    Blood Culture - Blood, Arm, Left Preliminary result    Blood Culture - Blood, Hand, Right Preliminary result               Sonido Jean MD  [facility]  09/16/23  15:19 EDT      Time: greater than 32 minutes on discharge  It was a pleasure taking care of this patient while in the hospital.

## 2023-09-16 NOTE — PROGRESS NOTES
"[facility] Clinical Pharmacy Services: Vancomycin Monitoring Note    Arline Zaragoza is a 68 y.o. female who is on day 4/5 of pharmacy to dose vancomycin for CNS Infection. Of note, last fever was 9/16/2023 (101 F)     Previous Vancomycin Dose:   1250 mg IV every  18  hours  Updated Cultures and Sensitivities:   9/13 Blood Cx (2/2): NGTD  9/13 VZV: negative  9/13 CSF Cx: NGTD  9/13 West Nile pending  9/13 Encephalitis panel unremarkable     9/15 MRI Brain w/ and w/o contrast: unremarkable (contrary to prior imaging)   Results from last 7 days   Lab Units 09/16/23  1530 09/15/23  0324   VANCOMYCIN TR mcg/mL 12.40 6.20     Vitals/Labs  Ht: 166.4 cm (65.5\"); Wt: 56.7 kg (125 lb)   Temp Readings from Last 1 Encounters:   09/16/23 98.4 °F (36.9 °C)     Estimated Creatinine Clearance: 66.9 mL/min (by C-G formula based on SCr of 0.73 mg/dL).        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 Will order follow-up level should ID choose to continue vancomycin.   We will continue to monitor patient changes and renal function     Thank you for involving pharmacy in this patient's care. Please contact pharmacy with any questions or concerns.       Connor Monroy, PharmD, BCPS, BCOP  Clinical Staff Pharmacist          "

## 2023-09-16 NOTE — OUTREACH NOTE
Prep Survey      Flowsheet Row Responses   [facility] facility patient discharged from? [facility]   Is LACE score < 7 ? No   Eligibility [facility]   Date of Admission 09/13/23   Date of Discharge 09/16/23   Discharge Disposition Home or Self Care   Discharge diagnosis Suspected infectious meningitis   Does the patient have one of the following disease processes/diagnoses(primary or secondary)? Other   Does the patient have Home health ordered? No   Is there a DME ordered? No   Prep survey completed? Yes            Christina DE SANTIAGO - Registered Nurse

## 2023-09-16 NOTE — PLAN OF CARE
Goal Outcome Evaluation:            Pt ran a fever during shift, she was given tylenol which brought it down. Vital signs are stable, will continue to monitor.       Problem: Adult Inpatient Plan of Care  Goal: Plan of Care Review  Outcome: Ongoing, Progressing  Goal: Patient-Specific Goal (Individualized)  Outcome: Ongoing, Progressing  Goal: Absence of Hospital-Acquired Illness or Injury  Outcome: Ongoing, Progressing  Intervention: Identify and Manage Fall Risk  Recent Flowsheet Documentation  Taken 9/16/2023 0400 by Joni Mike RN  Safety Promotion/Fall Prevention:   safety round/check completed   activity supervised   assistive device/personal items within reach   clutter free environment maintained   fall prevention program maintained  Taken 9/16/2023 0200 by Joni Mike, RN  Safety Promotion/Fall Prevention:   safety round/check completed   activity supervised   assistive device/personal items within reach   clutter free environment maintained   fall prevention program maintained  Taken 9/16/2023 0000 by Joni Mike RN  Safety Promotion/Fall Prevention:   safety round/check completed   activity supervised   assistive device/personal items within reach   clutter free environment maintained   fall prevention program maintained  Taken 9/15/2023 2200 by Joni Mike RN  Safety Promotion/Fall Prevention:   safety round/check completed   activity supervised   assistive device/personal items within reach   clutter free environment maintained   fall prevention program maintained  Taken 9/15/2023 2030 by Joni Mike RN  Safety Promotion/Fall Prevention:   safety round/check completed   activity supervised   assistive device/personal items within reach   clutter free environment maintained   fall prevention program maintained  Taken 9/15/2023 2000 by Joni Mike RN  Safety Promotion/Fall Prevention:   safety round/check completed   activity supervised   assistive  device/personal items within reach   clutter free environment maintained   fall prevention program maintained  Intervention: Prevent Skin Injury  Recent Flowsheet Documentation  Taken 9/16/2023 0400 by Joni Mike RN  Body Position: position changed independently  Taken 9/16/2023 0200 by Joni Mike RN  Body Position: position changed independently  Taken 9/16/2023 0000 by Joni Mike RN  Body Position: position changed independently  Taken 9/15/2023 2200 by Joni Mike RN  Body Position: position changed independently  Taken 9/15/2023 2030 by Joni Mike RN  Body Position: position changed independently  Intervention: Prevent and Manage VTE (Venous Thromboembolism) Risk  Recent Flowsheet Documentation  Taken 9/15/2023 2030 by Joni Mike RN  Range of Motion: active ROM (range of motion) encouraged  Intervention: Prevent Infection  Recent Flowsheet Documentation  Taken 9/16/2023 0400 by Joni Mike RN  Infection Prevention:   environmental surveillance performed   rest/sleep promoted  Taken 9/16/2023 0200 by Joni Mike RN  Infection Prevention: rest/sleep promoted  Taken 9/16/2023 0000 by Joni Mike RN  Infection Prevention:   rest/sleep promoted   environmental surveillance performed  Taken 9/15/2023 2200 by Joni Mike RN  Infection Prevention:   environmental surveillance performed   rest/sleep promoted  Taken 9/15/2023 2030 by Joni Mike RN  Infection Prevention:   rest/sleep promoted   environmental surveillance performed  Taken 9/15/2023 2000 by Joni Mike RN  Infection Prevention: rest/sleep promoted  Goal: Optimal Comfort and Wellbeing  Outcome: Ongoing, Progressing  Intervention: Provide Person-Centered Care  Recent Flowsheet Documentation  Taken 9/15/2023 2030 by Joni Mike RN  Trust Relationship/Rapport:   care explained   emotional support provided   empathic listening provided   questions  encouraged   reassurance provided   thoughts/feelings acknowledged  Goal: Readiness for Transition of Care  Outcome: Ongoing, Progressing

## 2023-09-18 ENCOUNTER — TRANSITIONAL CARE MANAGEMENT TELEPHONE ENCOUNTER (OUTPATIENT)
Dept: CALL CENTER | Facility: HOSPITAL | Age: 68
End: 2023-09-18
Payer: MEDICARE

## 2023-09-18 LAB
BACTERIA SPEC AEROBE CULT: NORMAL
BACTERIA SPEC AEROBE CULT: NORMAL

## 2023-09-18 NOTE — PROGRESS NOTES
Case Management Discharge Note      Final Note: Home    Provided Post Acute Provider List?: N/A  Provided Post Acute Provider Quality & Resource List?: N/A    Selected Continued Care - Discharged on 9/16/2023 Admission date: 9/13/2023 - Discharge disposition: Home or Self Care      Destination    No services have been selected for the patient.                Durable Medical Equipment    No services have been selected for the patient.                Dialysis/Infusion    No services have been selected for the patient.                Home Medical Care    No services have been selected for the patient.                Therapy    No services have been selected for the patient.                Community Resources    No services have been selected for the patient.                Community & DME    No services have been selected for the patient.                    Transportation Services  Private: Car    Final Discharge Disposition Code: 01 - home or self-care

## 2023-09-18 NOTE — OUTREACH NOTE
Call Center TCM Note      Flowsheet Row Responses   [facility] patient discharged from? [facility]   Does the patient have one of the following disease processes/diagnoses(primary or secondary)? Other   TCM attempt successful? Yes   Call start time 1351   Call end time 1355   Discharge diagnosis Suspected infectious meningitis   Person spoke with today (if not patient) and relationship Patient   Meds reviewed with patient/caregiver? Yes  [New: compazine]   Does the patient have all medications ordered at discharge? Yes   Is the patient taking all medications as directed (includes completed medication regime)? Yes   Comments PCP Allie PIERRE. Hospital follow up scheduled for 9/25/23  2pm with PCP   Does the patient have an appointment with their PCP within 7-14 days of discharge? No   Nursing Interventions Assisted patient with making appointment per protocol   Has home health visited the patient within 72 hours of discharge? N/A   Psychosocial issues? No   Did the patient receive a copy of their discharge instructions? Yes   Nursing interventions Reviewed instructions with patient   What is the patient's perception of their health status since discharge? Improving  [Patient reports slight improvement. ]   Is the patient/caregiver able to teach back signs and symptoms related to disease process for when to call PCP? Yes   Is the patient/caregiver able to teach back the hierarchy of who to call/visit for symptoms/problems? PCP, Specialist, Home health nurse, Urgent Care, ED, 911 Yes   TCM call completed? Yes   Call end time 1355   Would this patient benefit from a Referral to Amb Social Work? No   Is the patient interested in additional calls from an ambulatory ? No            Brianna Mosqueda RN    9/18/2023, 13:56 EDT

## 2023-09-20 LAB
WNV IGG SPEC QL IA: NEGATIVE
WNV IGM CSF QL IA: POSITIVE

## 2023-09-25 ENCOUNTER — OFFICE VISIT (OUTPATIENT)
Dept: INTERNAL MEDICINE | Facility: CLINIC | Age: 68
End: 2023-09-25

## 2023-09-25 VITALS
HEIGHT: 66 IN | HEART RATE: 75 BPM | SYSTOLIC BLOOD PRESSURE: 120 MMHG | DIASTOLIC BLOOD PRESSURE: 80 MMHG | WEIGHT: 126 LBS | BODY MASS INDEX: 20.25 KG/M2 | OXYGEN SATURATION: 95 %

## 2023-09-25 DIAGNOSIS — J43.1 PANLOBULAR EMPHYSEMA: Chronic | ICD-10-CM

## 2023-09-25 DIAGNOSIS — I25.10 CORONARY ARTERY DISEASE INVOLVING NATIVE CORONARY ARTERY OF NATIVE HEART WITHOUT ANGINA PECTORIS: Chronic | ICD-10-CM

## 2023-09-25 DIAGNOSIS — D64.9 LOW HEMOGLOBIN: Chronic | ICD-10-CM

## 2023-09-25 DIAGNOSIS — R73.01 IMPAIRED FASTING GLUCOSE: ICD-10-CM

## 2023-09-25 DIAGNOSIS — I10 PRIMARY HYPERTENSION: Chronic | ICD-10-CM

## 2023-09-25 DIAGNOSIS — R53.81 PHYSICAL DECONDITIONING: Primary | ICD-10-CM

## 2023-09-25 DIAGNOSIS — E78.2 MIXED HYPERLIPIDEMIA: Chronic | ICD-10-CM

## 2023-09-25 PROBLEM — R29.818 SUSPECTED INFECTIOUS MENINGITIS: Status: RESOLVED | Noted: 2023-09-13 | Resolved: 2023-09-25

## 2023-09-25 NOTE — ASSESSMENT & PLAN NOTE
Recommended to start increasing mobility by taking 3 5-10 minute walks throughout the house during daytime hours.   Encouraged food at regular intervals q4-q6h   Continue with hydration

## 2023-09-25 NOTE — PROGRESS NOTES
"Chief Complaint  Hospital Follow Up Visit and Meningitis    Subjective        Arline Zaragoza presents to [facility]  History of Present Illness  This is a 67 y/o female presenting to office for f/u hospitalization at  on 9/13/23 through 9/16/23 for suspected infectious meningitis. PMH includes fibromuscular dysplasia, breast cancer, HTN, dyslipidemia, psoriasis, osteoporosis. Patient had presented with 1 week hx of fevers, chills, nausea, and h/a. Patient underwent LP and was started on antibiotics. Patient was seen by ID; cultures returned negative after 48+ hours. MRI of brain was normal. Patient was discharged home and recommended f/u with PCP.     Patient reports she has not had another fever. Reports nausea has resolved. Reports she is just now getting her appetite back. Reports she has been able to eat some more; reports overall feeling deconditioned. Has been spending a lot of time lying down. Reports she has not been checking BP but has been taking medication as prescribed. Denies any CP or SOA. Patient has been tobacco free now for 4 weeks.     Objective   Vital Signs:  /80 (BP Location: Left arm, Patient Position: Sitting, Cuff Size: Adult)   Pulse 75   Ht 166.4 cm (65.5\")   Wt 57.2 kg (126 lb)   SpO2 95%   BMI 20.65 kg/m²   Estimated body mass index is 20.65 kg/m² as calculated from the following:    Height as of this encounter: 166.4 cm (65.5\").    Weight as of this encounter: 57.2 kg (126 lb).       BMI is within normal parameters. No other follow-up for BMI required.      Physical Exam  Constitutional:       Appearance: Normal appearance.   HENT:      Head: Normocephalic and atraumatic.      Right Ear: External ear normal.      Left Ear: External ear normal.      Nose: Nose normal.      Mouth/Throat:      Mouth: Mucous membranes are moist.      Pharynx: Oropharynx is clear.   Eyes:      Conjunctiva/sclera: Conjunctivae normal.      Pupils: Pupils are equal, " round, and reactive to light.   Cardiovascular:      Rate and Rhythm: Normal rate and regular rhythm.      Pulses: Normal pulses.      Heart sounds: Normal heart sounds. No murmur heard.    No friction rub. No gallop.   Pulmonary:      Effort: Pulmonary effort is normal. No respiratory distress.      Breath sounds: Normal breath sounds. No stridor. No wheezing, rhonchi or rales.   Musculoskeletal:      Cervical back: Normal range of motion and neck supple.   Skin:     General: Skin is warm and dry.   Neurological:      General: No focal deficit present.      Mental Status: She is alert and oriented to person, place, and time. Mental status is at baseline.   Psychiatric:         Mood and Affect: Mood normal.         Thought Content: Thought content normal.         Judgment: Judgment normal.      Result Review :  The following data was reviewed by: IGNACIO Lynch on 09/25/2023:  Common labs          9/13/2023    16:33 9/14/2023    06:05 9/15/2023    03:24   Common Labs   Glucose 148  106  104    BUN 15  17  9    Creatinine 0.89  0.73  0.73    Sodium 131  135  135    Potassium 4.1  3.7  4.0    Chloride 96  102  99    Calcium 9.0  8.4  8.4    Albumin 4.6   3.5    Total Bilirubin 0.7   0.4    Alkaline Phosphatase 73   49    AST (SGOT) 23   27    ALT (SGPT) 13   9    WBC 13.31  13.09  11.51    Hemoglobin 13.4  11.3  10.5    Hematocrit 38.6  33.1  31.4    Platelets 175  144  151                   Assessment and Plan   Diagnoses and all orders for this visit:    1. Physical deconditioning (Primary)  Assessment & Plan:  Recommended to start increasing mobility by taking 3 5-10 minute walks throughout the house during daytime hours.   Encouraged food at regular intervals q4-q6h   Continue with hydration      2. Primary hypertension  Assessment & Plan:  Hypertension is improving with treatment.  Continue current treatment regimen.  Blood pressure will be reassessed at the next regular appointment.    Orders:  -      Comprehensive metabolic panel    3. Mixed hyperlipidemia  Assessment & Plan:  Lipid abnormalities are improving with treatment.  Nutritional counseling was provided. and Pharmacotherapy as ordered.  Lipids will be reassessed in 6 months.    Orders:  -     TSH Rfx On Abnormal To Free T4    4. Low hemoglobin  -     CBC & Differential  -     Ferritin    5. Impaired fasting glucose  -     Hemoglobin A1c    6. Coronary artery disease involving native coronary artery of native heart without angina pectoris  Assessment & Plan:  Continues on aspirin.         7. Panlobular emphysema  Assessment & Plan:  COPD is improving with treatment.  Warning signs of respiratory distress were reviewed with the patient.   Counseled to avoid exposure to cigarette smoke.  Continues on symbicort.   Has albuterol on hand.                Follow Up   Return for  , Next scheduled follow up 1/2/24.  Patient was given instructions and counseling regarding her condition or for health maintenance advice. Please see specific information pulled into the AVS if appropriate.

## 2023-09-25 NOTE — PROGRESS NOTES
Enter Query Response Below      Query Response: Hyponatremia- clinically significant   Electronically signed by Sonido Jean MD, 23, 9:03 AM EDT.               If applicable, please update the problem list.     Patient: Arline Zaragoza        : 1955  Account: [id number]           Admit Date: 2023        How to Respond to this query:       a. Click New Note     b. Answer query within the yellow box.                c. Update the Problem List, if applicable.      If you have any questions about this query contact me at: [email or web address]         68 year-old patient admitted  with history breast cancer, psoriasis, HTN, and fibromuscular dysplasia documented to have hyponatremia on H&P, and , 09/15 attending progress notes.   Sodium of 131 on admission  Patient was treated with an IV LR bolus of 1000ml, then NS 75ml/hr and monitored.     Patient's sodium level improved to 135 by discharge. Discharge Summary lists hyponatremia.    Please clarify the following:    Hyponatremia- clinically significant    Hyponatremia- clinically insignificant    Other- specify______    Unable to determine      By submitting this query, we are merely seeking further clarification of documentation to accurately reflect all conditions that you are monitoring, evaluating, treating or that extend the hospitalization or utilize additional resources of care. Please utilize your independent clinical judgment when addressing the question(s) above.     This query and your response, once completed, will be entered into the legal medical record.    Sincerely,  Josefina STANLEY RN CDI CCDS  Clinical Documentation Integrity Program

## 2023-09-25 NOTE — ASSESSMENT & PLAN NOTE
COPD is improving with treatment.  Warning signs of respiratory distress were reviewed with the patient.   Counseled to avoid exposure to cigarette smoke.  Continues on symbicort.   Has albuterol on hand.

## 2023-09-26 LAB
ALBUMIN SERPL-MCNC: 4.6 G/DL (ref 3.5–5.2)
ALBUMIN/GLOB SERPL: 2.2 G/DL
ALP SERPL-CCNC: 65 U/L (ref 39–117)
ALT SERPL-CCNC: 20 U/L (ref 1–33)
AST SERPL-CCNC: 27 U/L (ref 1–32)
BASOPHILS # BLD AUTO: 0.06 10*3/MM3 (ref 0–0.2)
BASOPHILS NFR BLD AUTO: 0.8 % (ref 0–1.5)
BILIRUB SERPL-MCNC: 0.6 MG/DL (ref 0–1.2)
BUN SERPL-MCNC: 7 MG/DL (ref 8–23)
BUN/CREAT SERPL: 11.1 (ref 7–25)
CALCIUM SERPL-MCNC: 10 MG/DL (ref 8.6–10.5)
CHLORIDE SERPL-SCNC: 101 MMOL/L (ref 98–107)
CO2 SERPL-SCNC: 27.2 MMOL/L (ref 22–29)
CREAT SERPL-MCNC: 0.63 MG/DL (ref 0.57–1)
EGFRCR SERPLBLD CKD-EPI 2021: 97.4 ML/MIN/1.73
EOSINOPHIL # BLD AUTO: 0.06 10*3/MM3 (ref 0–0.4)
EOSINOPHIL NFR BLD AUTO: 0.8 % (ref 0.3–6.2)
ERYTHROCYTE [DISTWIDTH] IN BLOOD BY AUTOMATED COUNT: 13.1 % (ref 12.3–15.4)
FERRITIN SERPL-MCNC: 391 NG/ML (ref 13–150)
GLOBULIN SER CALC-MCNC: 2.1 GM/DL
GLUCOSE SERPL-MCNC: 107 MG/DL (ref 65–99)
HBA1C MFR BLD: 5.9 % (ref 4.8–5.6)
HCT VFR BLD AUTO: 36.8 % (ref 34–46.6)
HGB BLD-MCNC: 12.3 G/DL (ref 12–15.9)
IMM GRANULOCYTES # BLD AUTO: 0.02 10*3/MM3 (ref 0–0.05)
IMM GRANULOCYTES NFR BLD AUTO: 0.3 % (ref 0–0.5)
LYMPHOCYTES # BLD AUTO: 2.14 10*3/MM3 (ref 0.7–3.1)
LYMPHOCYTES NFR BLD AUTO: 29.8 % (ref 19.6–45.3)
MCH RBC QN AUTO: 32 PG (ref 26.6–33)
MCHC RBC AUTO-ENTMCNC: 33.4 G/DL (ref 31.5–35.7)
MCV RBC AUTO: 95.8 FL (ref 79–97)
MONOCYTES # BLD AUTO: 0.59 10*3/MM3 (ref 0.1–0.9)
MONOCYTES NFR BLD AUTO: 8.2 % (ref 5–12)
NEUTROPHILS # BLD AUTO: 4.32 10*3/MM3 (ref 1.7–7)
NEUTROPHILS NFR BLD AUTO: 60.1 % (ref 42.7–76)
NRBC BLD AUTO-RTO: 0 /100 WBC (ref 0–0.2)
PLATELET # BLD AUTO: 266 10*3/MM3 (ref 140–450)
POTASSIUM SERPL-SCNC: 5 MMOL/L (ref 3.5–5.2)
PROT SERPL-MCNC: 6.7 G/DL (ref 6–8.5)
RBC # BLD AUTO: 3.84 10*6/MM3 (ref 3.77–5.28)
SODIUM SERPL-SCNC: 139 MMOL/L (ref 136–145)
TSH SERPL DL<=0.005 MIU/L-ACNC: 2.05 UIU/ML (ref 0.27–4.2)
WBC # BLD AUTO: 7.19 10*3/MM3 (ref 3.4–10.8)

## 2023-09-26 NOTE — PROGRESS NOTES
Enter Query Response Below      Query Response: Positive test with active infection present on admission now resolved   Electronically signed by Sonido Jean MD, 23, 8:29 PM EDT.               If applicable, please update the problem list.       Patient: Arline Zaragoza        : 1955  Account: [id number]           Admit Date: 2023        How to Respond to this query:       a. Click New Note     b. Answer query within the yellow box.                c. Update the Problem List, if applicable.      If you have any questions about this query contact me at: [email or web address]           68 year old patient with history breast cancer, CAD, fibromuscular dysplasia, hypertension, dyslipidemia, psoriasis, osteoporosis presents with 1 week history of fevers, chills, nausea and headache.  Patient  underwent lumbar puncture and was started on antibiotics, and seen by ID. Patient noted to have mosquito bites, West Nile test pending at discharge. Other Cultures were negative at 48+hours. Her fever is improved and no additional infectious symptoms besides the initial fevers as well as headache and nausea (no abdominal pain, no shortness of breath, no dysuria. West Nile results with Negative IGG CSF and positive IGM CSF.    After study, please clarify the significance of the positive West Nile IGM CSF test:     - Positive test with active infection present on admission now resolved     - Positive test without active infection     - Other (please specify) ___________________     - Unable to clinically determine    By submitting this query, we are merely seeking further clarification of documentation to accurately reflect all conditions that you are monitoring, evaluating, treating or that extend the hospitalization or utilize additional resources of care. Please utilize your independent clinical judgment when addressing the question(s) above.     This query and your response, once completed,  will be entered into the legal medical record.    Sincerely,  Josefina STANLEY RN CDI CCDS  Clinical Documentation Integrity Program

## 2023-09-28 ENCOUNTER — EXTERNAL PBMM DATA (OUTPATIENT)
Dept: PHARMACY | Facility: OTHER | Age: 68
End: 2023-09-28
Payer: MEDICARE

## 2023-09-28 ENCOUNTER — PATIENT MESSAGE (OUTPATIENT)
Dept: INTERNAL MEDICINE | Facility: CLINIC | Age: 68
End: 2023-09-28
Payer: MEDICARE

## 2023-09-28 NOTE — TELEPHONE ENCOUNTER
From: Arline Zaragoza  To: Allie Farah  Sent: 9/28/2023 10:15 AM EDT  Subject: Blood pressure     Bp last 2days- 183/33 after meds, 137/32 before meds

## 2023-10-02 RX ORDER — ATORVASTATIN CALCIUM 20 MG/1
TABLET, FILM COATED ORAL
Qty: 90 TABLET | Refills: 1 | Status: SHIPPED | OUTPATIENT
Start: 2023-10-02

## 2023-11-13 RX ORDER — IRBESARTAN 300 MG/1
300 TABLET ORAL DAILY
Qty: 90 TABLET | Refills: 0 | Status: SHIPPED | OUTPATIENT
Start: 2023-11-13

## 2023-12-18 RX ORDER — RALOXIFENE HYDROCHLORIDE 60 MG/1
60 TABLET, FILM COATED ORAL DAILY
Qty: 90 TABLET | OUTPATIENT
Start: 2023-12-18

## 2024-01-02 ENCOUNTER — OFFICE VISIT (OUTPATIENT)
Dept: INTERNAL MEDICINE | Facility: CLINIC | Age: 69
End: 2024-01-02
Payer: MEDICARE

## 2024-01-02 VITALS
DIASTOLIC BLOOD PRESSURE: 80 MMHG | HEART RATE: 101 BPM | WEIGHT: 125 LBS | BODY MASS INDEX: 20.48 KG/M2 | SYSTOLIC BLOOD PRESSURE: 128 MMHG | OXYGEN SATURATION: 97 %

## 2024-01-02 DIAGNOSIS — I25.10 CORONARY ARTERY DISEASE INVOLVING NATIVE CORONARY ARTERY OF NATIVE HEART WITHOUT ANGINA PECTORIS: Chronic | ICD-10-CM

## 2024-01-02 DIAGNOSIS — I10 PRIMARY HYPERTENSION: Primary | Chronic | ICD-10-CM

## 2024-01-02 DIAGNOSIS — Z17.0 MALIGNANT NEOPLASM OF RIGHT BREAST IN FEMALE, ESTROGEN RECEPTOR POSITIVE, UNSPECIFIED SITE OF BREAST: ICD-10-CM

## 2024-01-02 DIAGNOSIS — E78.2 MIXED HYPERLIPIDEMIA: Chronic | ICD-10-CM

## 2024-01-02 DIAGNOSIS — Z72.0 TOBACCO USE: Chronic | ICD-10-CM

## 2024-01-02 DIAGNOSIS — J43.1 PANLOBULAR EMPHYSEMA: Chronic | ICD-10-CM

## 2024-01-02 DIAGNOSIS — R73.03 PREDIABETES: Chronic | ICD-10-CM

## 2024-01-02 DIAGNOSIS — I65.22 LEFT CAROTID STENOSIS: Chronic | ICD-10-CM

## 2024-01-02 DIAGNOSIS — M81.0 OSTEOPOROSIS, UNSPECIFIED OSTEOPOROSIS TYPE, UNSPECIFIED PATHOLOGICAL FRACTURE PRESENCE: Chronic | ICD-10-CM

## 2024-01-02 DIAGNOSIS — C50.911 MALIGNANT NEOPLASM OF RIGHT BREAST IN FEMALE, ESTROGEN RECEPTOR POSITIVE, UNSPECIFIED SITE OF BREAST: ICD-10-CM

## 2024-01-02 PROBLEM — R53.81 PHYSICAL DECONDITIONING: Status: RESOLVED | Noted: 2023-09-25 | Resolved: 2024-01-02

## 2024-01-02 LAB
ALBUMIN SERPL-MCNC: 4.8 G/DL (ref 3.5–5.2)
ALBUMIN/GLOB SERPL: 2.4 G/DL
ALP SERPL-CCNC: 87 U/L (ref 39–117)
ALT SERPL-CCNC: 24 U/L (ref 1–33)
AST SERPL-CCNC: 24 U/L (ref 1–32)
BASOPHILS # BLD AUTO: 0.04 10*3/MM3 (ref 0–0.2)
BASOPHILS NFR BLD AUTO: 0.5 % (ref 0–1.5)
BILIRUB SERPL-MCNC: 0.5 MG/DL (ref 0–1.2)
BUN SERPL-MCNC: 9 MG/DL (ref 8–23)
BUN/CREAT SERPL: 11.3 (ref 7–25)
CALCIUM SERPL-MCNC: 9.6 MG/DL (ref 8.6–10.5)
CHLORIDE SERPL-SCNC: 104 MMOL/L (ref 98–107)
CHOLEST SERPL-MCNC: 160 MG/DL (ref 0–200)
CO2 SERPL-SCNC: 21.8 MMOL/L (ref 22–29)
CREAT SERPL-MCNC: 0.8 MG/DL (ref 0.57–1)
EGFRCR SERPLBLD CKD-EPI 2021: 80.9 ML/MIN/1.73
EOSINOPHIL # BLD AUTO: 0.08 10*3/MM3 (ref 0–0.4)
EOSINOPHIL NFR BLD AUTO: 1.1 % (ref 0.3–6.2)
ERYTHROCYTE [DISTWIDTH] IN BLOOD BY AUTOMATED COUNT: 12.8 % (ref 12.3–15.4)
GLOBULIN SER CALC-MCNC: 2 GM/DL
GLUCOSE SERPL-MCNC: 116 MG/DL (ref 65–99)
HBA1C MFR BLD: 5.4 % (ref 4.8–5.6)
HCT VFR BLD AUTO: 40.2 % (ref 34–46.6)
HDLC SERPL-MCNC: 102 MG/DL (ref 40–60)
HGB BLD-MCNC: 13.7 G/DL (ref 12–15.9)
IMM GRANULOCYTES # BLD AUTO: 0.02 10*3/MM3 (ref 0–0.05)
IMM GRANULOCYTES NFR BLD AUTO: 0.3 % (ref 0–0.5)
LDLC SERPL CALC-MCNC: 45 MG/DL (ref 0–100)
LYMPHOCYTES # BLD AUTO: 1.18 10*3/MM3 (ref 0.7–3.1)
LYMPHOCYTES NFR BLD AUTO: 15.9 % (ref 19.6–45.3)
MCH RBC QN AUTO: 32 PG (ref 26.6–33)
MCHC RBC AUTO-ENTMCNC: 34.1 G/DL (ref 31.5–35.7)
MCV RBC AUTO: 93.9 FL (ref 79–97)
MONOCYTES # BLD AUTO: 0.65 10*3/MM3 (ref 0.1–0.9)
MONOCYTES NFR BLD AUTO: 8.8 % (ref 5–12)
NEUTROPHILS # BLD AUTO: 5.44 10*3/MM3 (ref 1.7–7)
NEUTROPHILS NFR BLD AUTO: 73.4 % (ref 42.7–76)
NRBC BLD AUTO-RTO: 0 /100 WBC (ref 0–0.2)
PLATELET # BLD AUTO: 172 10*3/MM3 (ref 140–450)
POTASSIUM SERPL-SCNC: 4.4 MMOL/L (ref 3.5–5.2)
PROT SERPL-MCNC: 6.8 G/DL (ref 6–8.5)
RBC # BLD AUTO: 4.28 10*6/MM3 (ref 3.77–5.28)
SODIUM SERPL-SCNC: 138 MMOL/L (ref 136–145)
TRIGL SERPL-MCNC: 63 MG/DL (ref 0–150)
TSH SERPL DL<=0.005 MIU/L-ACNC: 1.89 UIU/ML (ref 0.27–4.2)
VLDLC SERPL CALC-MCNC: 13 MG/DL (ref 5–40)
WBC # BLD AUTO: 7.41 10*3/MM3 (ref 3.4–10.8)

## 2024-01-02 NOTE — PROGRESS NOTES
"Chief Complaint  Hyperlipidemia and Hypertension    Subjective        Arline Zaragoza presents to [facility]  History of Present Illness  This is a 67 y/o female presenting to office for f/u with chronic health conditions.     HTN-- continues on irbesartan. Has not been checking regularly. Denies any CP.     HLD-- continues on statin. Reports she has not been exercising formally. Reports lots of walking with taking care of agin parents.     Hx COPD-- continues on symbicort; denies having to use albuterol at all. Denies ARMSTRONG or wheezing.     Hx osteoporosis-- continues on evista. Following with oncology/hematology       Objective   Vital Signs:  /80 (BP Location: Left arm, Patient Position: Sitting, Cuff Size: Adult)   Pulse 101   Wt 56.7 kg (125 lb)   SpO2 97%   BMI 20.48 kg/m²   Estimated body mass index is 20.48 kg/m² as calculated from the following:    Height as of 9/25/23: 166.4 cm (65.5\").    Weight as of this encounter: 56.7 kg (125 lb).       BMI is within normal parameters. No other follow-up for BMI required.      Physical Exam  Constitutional:       General: She is awake.      Appearance: Normal appearance.   HENT:      Head: Normocephalic and atraumatic.      Right Ear: Hearing and external ear normal.      Left Ear: Hearing and external ear normal.      Nose: Nose normal.      Mouth/Throat:      Lips: Pink.      Mouth: Mucous membranes are moist.      Pharynx: Oropharynx is clear.   Eyes:      Extraocular Movements: Extraocular movements intact.      Conjunctiva/sclera: Conjunctivae normal.      Pupils: Pupils are equal, round, and reactive to light.   Cardiovascular:      Rate and Rhythm: Normal rate and regular rhythm.      Pulses: Normal pulses.      Heart sounds: Normal heart sounds. No murmur heard.     No friction rub. No gallop.   Pulmonary:      Effort: Pulmonary effort is normal. No respiratory distress.      Breath sounds: Normal breath sounds. No stridor. " No wheezing, rhonchi or rales.   Musculoskeletal:         General: No swelling. Normal range of motion.      Cervical back: Normal range of motion and neck supple.   Skin:     General: Skin is warm and dry.      Capillary Refill: Capillary refill takes less than 2 seconds.      Coloration: Skin is not jaundiced.   Neurological:      General: No focal deficit present.      Mental Status: She is alert and oriented to person, place, and time. Mental status is at baseline.      Motor: Motor function is intact.      Coordination: Coordination is intact.      Gait: Gait is intact.      Deep Tendon Reflexes: Reflexes are normal and symmetric.   Psychiatric:         Attention and Perception: Attention normal.         Mood and Affect: Mood normal.         Speech: Speech normal.         Behavior: Behavior normal. Behavior is cooperative.         Thought Content: Thought content normal.         Cognition and Memory: Cognition normal.         Judgment: Judgment normal.        Result Review :  The following data was reviewed by: IGNACIO Lynch on 01/02/2024:  Common labs          9/14/2023    06:05 9/15/2023    03:24 9/25/2023    14:40   Common Labs   Glucose 106  104  107    BUN 17  9  7    Creatinine 0.73  0.73  0.63    Sodium 135  135  139    Potassium 3.7  4.0  5.0    Chloride 102  99  101    Calcium 8.4  8.4  10.0    Total Protein   6.7    Albumin  3.5  4.6    Total Bilirubin  0.4  0.6    Alkaline Phosphatase  49  65    AST (SGOT)  27  27    ALT (SGPT)  9  20    WBC 13.09  11.51  7.19    Hemoglobin 11.3  10.5  12.3    Hematocrit 33.1  31.4  36.8    Platelets 144  151  266    Hemoglobin A1C   5.90                   Assessment and Plan   Diagnoses and all orders for this visit:    1. Primary hypertension (Primary)  Assessment & Plan:  Hypertension is improving with treatment.  Continue current treatment regimen.  Dietary sodium restriction.  Regular aerobic exercise.  Stop smoking.  Continue current medications.  Blood  pressure will be reassessed at the next regular appointment.  AHA hand out provided  Recommended monitoring BP at home with BP arm cuff  Goal /80    Orders:  -     CBC & Differential  -     Comprehensive metabolic panel    2. Coronary artery disease involving native coronary artery of native heart without angina pectoris  Assessment & Plan:  On ASA      3. Mixed hyperlipidemia  Assessment & Plan:  Lipid abnormalities are improving with treatment.  Nutritional counseling was provided. and Pharmacotherapy as ordered.  Lipids will be reassessed in 6 months.    Orders:  -     TSH Rfx On Abnormal To Free T4  -     Lipid panel    4. Left carotid stenosis  Assessment & Plan:  Following with vascular  On aspirin, statin      5. Osteoporosis, unspecified osteoporosis type, unspecified pathological fracture presence  Assessment & Plan:  Continues on evista  Recommended 150-300 minutes weekly weight bearing exercise.       6. Panlobular emphysema  Assessment & Plan:  COPD is improving with treatment.  Discussed monitoring symptoms and use of quick-relief medications and contacting us early in the course of exacerbations.  Warning signs of respiratory distress were reviewed with the patient.   Counseled to avoid exposure to cigarette smoke.  Continue current medications.          7. Prediabetes  Assessment & Plan:  Recommended low glycemic diet choices  Lab ordered    Orders:  -     Hemoglobin A1c    8. Tobacco use  Assessment & Plan:  Not wanting to quit at this time.     Orders:  -     Tobacco Cessation Education; Standing  -     Tobacco Cessation Education    9. Malignant neoplasm of right breast in female, estrogen receptor positive, unspecified site of breast  Assessment & Plan:  Following with oncology for surveillance               Follow Up   Return in about 6 months (around 7/2/2024) for Medicare Wellness.  Patient was given instructions and counseling regarding her condition or for health maintenance advice.  Please see specific information pulled into the AVS if appropriate.         Answers submitted by the patient for this visit:  Other (Submitted on 1/1/2024)  Please describe your symptoms.: Annual check up  Have you had these symptoms before?: No  How long have you been having these symptoms?: 1-4 days  Please list any medications you are currently taking for this condition.: None  Primary Reason for Visit (Submitted on 1/1/2024)  What is the primary reason for your visit?: Other

## 2024-01-02 NOTE — ASSESSMENT & PLAN NOTE
Hypertension is improving with treatment.  Continue current treatment regimen.  Dietary sodium restriction.  Regular aerobic exercise.  Stop smoking.  Continue current medications.  Blood pressure will be reassessed at the next regular appointment.  AHA hand out provided  Recommended monitoring BP at home with BP arm cuff  Goal /80

## 2024-01-03 ENCOUNTER — PATIENT MESSAGE (OUTPATIENT)
Dept: INTERNAL MEDICINE | Facility: CLINIC | Age: 69
End: 2024-01-03
Payer: MEDICARE

## 2024-01-03 DIAGNOSIS — U07.1 COVID-19: Primary | ICD-10-CM

## 2024-02-12 RX ORDER — IRBESARTAN 300 MG/1
300 TABLET ORAL DAILY
Qty: 90 TABLET | Refills: 1 | Status: SHIPPED | OUTPATIENT
Start: 2024-02-12

## 2024-02-14 RX ORDER — RALOXIFENE HYDROCHLORIDE 60 MG/1
60 TABLET, FILM COATED ORAL DAILY
Qty: 90 TABLET | OUTPATIENT
Start: 2024-02-14

## 2024-02-16 RX ORDER — RALOXIFENE HYDROCHLORIDE 60 MG/1
60 TABLET, FILM COATED ORAL DAILY
Qty: 90 TABLET | OUTPATIENT
Start: 2024-02-16

## 2024-02-28 RX ORDER — RALOXIFENE HYDROCHLORIDE 60 MG/1
60 TABLET, FILM COATED ORAL DAILY
Qty: 30 TABLET | Refills: 5 | Status: SHIPPED | OUTPATIENT
Start: 2024-02-28

## 2024-02-28 NOTE — TELEPHONE ENCOUNTER
Caller: Arline Zaragoza CARTER    Relationship: Self    Best call back number: [phone]    Requested Prescriptions:   Requested Prescriptions     Pending Prescriptions Disp Refills    raloxifene (EVISTA) 60 MG tablet 30 tablet 5     Sig: Take 1 tablet by mouth Daily.        Pharmacy where request should be sent: [facility] [phone] [address] [phone] [facility] [phone] FX     Last office visit with prescribing clinician: 2/16/2023   Last telemedicine visit with prescribing clinician: Visit date not found   Next office visit with prescribing clinician: Visit date not found     Additional details provided by patient: PLEASE CALL PATIENT IF MEDICATION CANNOT BE REFILLED.    Does the patient have less than a 3 day supply:  [x] Yes  [] No    Would you like a call back once the refill request has been completed: [] Yes [x] No    If the office needs to give you a call back, can they leave a voicemail: [] Yes [x] No

## 2024-03-27 DIAGNOSIS — I65.23 BILATERAL CAROTID ARTERY STENOSIS: Primary | ICD-10-CM

## 2024-03-30 ENCOUNTER — HOSPITAL ENCOUNTER (OUTPATIENT)
Facility: HOSPITAL | Age: 69
Discharge: HOME | End: 2024-03-30
Payer: MEDICARE

## 2024-03-30 DIAGNOSIS — Z87.891 PERSONAL HISTORY OF TOBACCO USE: ICD-10-CM

## 2024-03-30 PROCEDURE — 71271 CT THORAX LUNG CANCER SCR C-: CPT

## 2024-04-03 DIAGNOSIS — Z72.0 TOBACCO USE: Primary | ICD-10-CM

## 2024-04-03 DIAGNOSIS — Z87.891 PERSONAL HISTORY OF NICOTINE DEPENDENCE: ICD-10-CM

## 2024-04-29 RX ORDER — ATORVASTATIN CALCIUM 20 MG/1
TABLET, FILM COATED ORAL
Qty: 90 TABLET | Refills: 1 | Status: SHIPPED | OUTPATIENT
Start: 2024-04-29

## 2024-04-29 NOTE — TELEPHONE ENCOUNTER
Refill sent-- she was on paxlovid last visit and we had to hold off on the statin, she should still be taking this cholesterol medication

## 2024-07-03 ENCOUNTER — OFFICE VISIT (OUTPATIENT)
Dept: INTERNAL MEDICINE | Facility: CLINIC | Age: 69
End: 2024-07-03
Payer: MEDICARE

## 2024-07-03 VITALS
WEIGHT: 126 LBS | OXYGEN SATURATION: 98 % | HEIGHT: 65 IN | SYSTOLIC BLOOD PRESSURE: 128 MMHG | BODY MASS INDEX: 20.99 KG/M2 | DIASTOLIC BLOOD PRESSURE: 82 MMHG | HEART RATE: 78 BPM

## 2024-07-03 DIAGNOSIS — Z12.31 SCREENING MAMMOGRAM FOR BREAST CANCER: ICD-10-CM

## 2024-07-03 DIAGNOSIS — Z00.00 MEDICARE ANNUAL WELLNESS VISIT, SUBSEQUENT: Primary | ICD-10-CM

## 2024-07-03 DIAGNOSIS — C50.911 MALIGNANT NEOPLASM OF RIGHT BREAST IN FEMALE, ESTROGEN RECEPTOR POSITIVE, UNSPECIFIED SITE OF BREAST: ICD-10-CM

## 2024-07-03 DIAGNOSIS — I25.10 CORONARY ARTERY DISEASE INVOLVING NATIVE CORONARY ARTERY OF NATIVE HEART WITHOUT ANGINA PECTORIS: ICD-10-CM

## 2024-07-03 DIAGNOSIS — Z78.0 POST-MENOPAUSAL: ICD-10-CM

## 2024-07-03 DIAGNOSIS — R09.89 OTHER SPECIFIED SYMPTOMS AND SIGNS INVOLVING THE CIRCULATORY AND RESPIRATORY SYSTEMS: ICD-10-CM

## 2024-07-03 DIAGNOSIS — J43.1 PANLOBULAR EMPHYSEMA: ICD-10-CM

## 2024-07-03 DIAGNOSIS — E78.2 MIXED HYPERLIPIDEMIA: ICD-10-CM

## 2024-07-03 DIAGNOSIS — M81.0 OSTEOPOROSIS, UNSPECIFIED OSTEOPOROSIS TYPE, UNSPECIFIED PATHOLOGICAL FRACTURE PRESENCE: ICD-10-CM

## 2024-07-03 DIAGNOSIS — Z98.890 S/P CAROTID ENDARTERECTOMY: ICD-10-CM

## 2024-07-03 DIAGNOSIS — Z17.0 MALIGNANT NEOPLASM OF RIGHT BREAST IN FEMALE, ESTROGEN RECEPTOR POSITIVE, UNSPECIFIED SITE OF BREAST: ICD-10-CM

## 2024-07-03 DIAGNOSIS — R73.03 PREDIABETES: ICD-10-CM

## 2024-07-03 DIAGNOSIS — I10 PRIMARY HYPERTENSION: ICD-10-CM

## 2024-07-03 DIAGNOSIS — Z00.00 ANNUAL PHYSICAL EXAM: ICD-10-CM

## 2024-07-03 DIAGNOSIS — R68.89 ABNORMAL ANKLE BRACHIAL INDEX (ABI): ICD-10-CM

## 2024-07-03 DIAGNOSIS — Z72.0 TOBACCO USE: ICD-10-CM

## 2024-07-03 LAB
ALBUMIN SERPL-MCNC: 4.8 G/DL (ref 3.5–5.2)
ALBUMIN/GLOB SERPL: 2.3 G/DL
ALP SERPL-CCNC: 91 U/L (ref 39–117)
ALT SERPL-CCNC: 22 U/L (ref 1–33)
AST SERPL-CCNC: 25 U/L (ref 1–32)
BASOPHILS # BLD AUTO: 0.03 10*3/MM3 (ref 0–0.2)
BASOPHILS NFR BLD AUTO: 0.3 % (ref 0–1.5)
BILIRUB SERPL-MCNC: 0.5 MG/DL (ref 0–1.2)
BUN SERPL-MCNC: 10 MG/DL (ref 8–23)
BUN/CREAT SERPL: 12.7 (ref 7–25)
CALCIUM SERPL-MCNC: 9.9 MG/DL (ref 8.6–10.5)
CHLORIDE SERPL-SCNC: 102 MMOL/L (ref 98–107)
CHOLEST SERPL-MCNC: 181 MG/DL (ref 0–200)
CO2 SERPL-SCNC: 24.6 MMOL/L (ref 22–29)
CREAT SERPL-MCNC: 0.79 MG/DL (ref 0.57–1)
EGFRCR SERPLBLD CKD-EPI 2021: 81.6 ML/MIN/1.73
EOSINOPHIL # BLD AUTO: 0.04 10*3/MM3 (ref 0–0.4)
EOSINOPHIL NFR BLD AUTO: 0.4 % (ref 0.3–6.2)
ERYTHROCYTE [DISTWIDTH] IN BLOOD BY AUTOMATED COUNT: 12.9 % (ref 12.3–15.4)
GLOBULIN SER CALC-MCNC: 2.1 GM/DL
GLUCOSE SERPL-MCNC: 111 MG/DL (ref 65–99)
HBA1C MFR BLD: 5.6 % (ref 4.8–5.6)
HCT VFR BLD AUTO: 41.9 % (ref 34–46.6)
HDLC SERPL-MCNC: 105 MG/DL (ref 40–60)
HGB BLD-MCNC: 13.8 G/DL (ref 12–15.9)
IMM GRANULOCYTES # BLD AUTO: 0.04 10*3/MM3 (ref 0–0.05)
IMM GRANULOCYTES NFR BLD AUTO: 0.4 % (ref 0–0.5)
LDLC SERPL CALC-MCNC: 60 MG/DL (ref 0–100)
LYMPHOCYTES # BLD AUTO: 2.03 10*3/MM3 (ref 0.7–3.1)
LYMPHOCYTES NFR BLD AUTO: 21.2 % (ref 19.6–45.3)
MCH RBC QN AUTO: 31 PG (ref 26.6–33)
MCHC RBC AUTO-ENTMCNC: 32.9 G/DL (ref 31.5–35.7)
MCV RBC AUTO: 94.2 FL (ref 79–97)
MONOCYTES # BLD AUTO: 0.74 10*3/MM3 (ref 0.1–0.9)
MONOCYTES NFR BLD AUTO: 7.7 % (ref 5–12)
NEUTROPHILS # BLD AUTO: 6.71 10*3/MM3 (ref 1.7–7)
NEUTROPHILS NFR BLD AUTO: 70 % (ref 42.7–76)
NRBC BLD AUTO-RTO: 0 /100 WBC (ref 0–0.2)
PLATELET # BLD AUTO: 218 10*3/MM3 (ref 140–450)
POTASSIUM SERPL-SCNC: 5.2 MMOL/L (ref 3.5–5.2)
PROT SERPL-MCNC: 6.9 G/DL (ref 6–8.5)
RBC # BLD AUTO: 4.45 10*6/MM3 (ref 3.77–5.28)
SODIUM SERPL-SCNC: 140 MMOL/L (ref 136–145)
TRIGL SERPL-MCNC: 89 MG/DL (ref 0–150)
TSH SERPL DL<=0.005 MIU/L-ACNC: 1.37 UIU/ML (ref 0.27–4.2)
VLDLC SERPL CALC-MCNC: 16 MG/DL (ref 5–40)
WBC # BLD AUTO: 9.59 10*3/MM3 (ref 3.4–10.8)

## 2024-07-03 PROCEDURE — 1126F AMNT PAIN NOTED NONE PRSNT: CPT | Performed by: NURSE PRACTITIONER

## 2024-07-03 PROCEDURE — 1170F FXNL STATUS ASSESSED: CPT | Performed by: NURSE PRACTITIONER

## 2024-07-03 PROCEDURE — 3074F SYST BP LT 130 MM HG: CPT | Performed by: NURSE PRACTITIONER

## 2024-07-03 PROCEDURE — 99397 PER PM REEVAL EST PAT 65+ YR: CPT | Performed by: NURSE PRACTITIONER

## 2024-07-03 PROCEDURE — 1160F RVW MEDS BY RX/DR IN RCRD: CPT | Performed by: NURSE PRACTITIONER

## 2024-07-03 PROCEDURE — 1159F MED LIST DOCD IN RCRD: CPT | Performed by: NURSE PRACTITIONER

## 2024-07-03 PROCEDURE — 99214 OFFICE O/P EST MOD 30 MIN: CPT | Performed by: NURSE PRACTITIONER

## 2024-07-03 PROCEDURE — G0439 PPPS, SUBSEQ VISIT: HCPCS | Performed by: NURSE PRACTITIONER

## 2024-07-03 PROCEDURE — 3079F DIAST BP 80-89 MM HG: CPT | Performed by: NURSE PRACTITIONER

## 2024-07-03 NOTE — ASSESSMENT & PLAN NOTE
Chronic condition,   On exam b/l ears with impacted cerumen  currete used to remove small amount of ear wax  Patient advised to use debrox ear softening drops.   Lavage next visit if persistent cerumen impaction.    Continues on statin therapy, aspirin

## 2024-07-03 NOTE — PROGRESS NOTES
The ABCs of the Annual Wellness Visit  Subsequent Medicare Wellness Visit    Subjective    Arline Zaragoza is a 69 y.o. female who presents for a Subsequent Medicare Wellness Visit.    The following portions of the patient's history were reviewed and   updated as appropriate: allergies, current medications, past family history, past medical history, past social history, past surgical history, and problem list.    Compared to one year ago, the patient feels her physical   health is the same.    Compared to one year ago, the patient feels her mental   health is the same.    Recent Hospitalizations:  This patient has had a [facility] admission record on file within the last 365 days.    Current Medical Providers:  Patient Care Team:  Allie Farah APRN as PCP - General (Internal Medicine)  Karo Desai MD as Consulting Physician (Hematology and Oncology)  Sobeida Martinez APRN as Nurse Practitioner (Vascular Surgery)    Outpatient Medications Prior to Visit   Medication Sig Dispense Refill    albuterol sulfate  (90 Base) MCG/ACT inhaler Inhale 2 puffs Every 4 (Four) Hours As Needed for Wheezing.      aspirin 81 MG EC tablet Take 1 tablet by mouth Daily.      atorvastatin (LIPITOR) 20 MG tablet TAKE ONE TABLET BY MOUTH ONCE NIGHTLY 90 tablet 1    budesonide-formoterol (Symbicort) 160-4.5 MCG/ACT inhaler Inhale 2 puffs 2 (Two) Times a Day. 10.2 g 12    irbesartan (AVAPRO) 300 MG tablet TAKE 1 TABLET BY MOUTH DAILY 90 tablet 1    raloxifene (EVISTA) 60 MG tablet Take 1 tablet by mouth Daily. 30 tablet 5     No facility-administered medications prior to visit.       No opioid medication identified on active medication list. I have reviewed chart for other potential  high risk medication/s and harmful drug interactions in the elderly.        Aspirin is on active medication list. Aspirin use is indicated based on review of current medical condition/s. Pros and cons of this therapy have been discussed  "today. Benefits of this medication outweigh potential harm.  Patient has been encouraged to continue taking this medication.  .      Patient Active Problem List   Diagnosis    Hyperlipidemia    Cancer    CAD (coronary artery disease)    Bilateral carotid artery stenosis    Closed displaced fracture of fifth metatarsal bone of right foot    Tobacco use    Sprain of right ankle    Closed nondisplaced fracture of anterior process of left calcaneus    Pain in left foot    Osteoporosis    Malignant neoplasm of female breast    Primary hypertension    S/P carotid endarterectomy    Screening mammogram for breast cancer    Hyponatremia    Panlobular emphysema    Prediabetes    Annual physical exam     Advance Care Planning   Advance Care Planning     Advance Directive is not on file.  ACP discussion was held with the patient during this visit. Patient has an advance directive (not in EMR), copy requested.     Objective    Vitals:    07/03/24 0950   BP: 128/82   BP Location: Left arm   Patient Position: Sitting   Cuff Size: Adult   Pulse: 78   SpO2: 98%   Weight: 57.2 kg (126 lb)   Height: 165.1 cm (65\")   PainSc: 0-No pain     Estimated body mass index is 20.97 kg/m² as calculated from the following:    Height as of this encounter: 165.1 cm (65\").    Weight as of this encounter: 57.2 kg (126 lb).    BMI is within normal parameters. No other follow-up for BMI required.      Does the patient have evidence of cognitive impairment? No          HEALTH RISK ASSESSMENT    Smoking Status:  Social History     Tobacco Use   Smoking Status Some Days    Current packs/day: 1.00    Average packs/day: 1 pack/day for 42.5 years (42.5 ttl pk-yrs)    Types: Cigarettes    Start date: 1/1/1982   Smokeless Tobacco Never   Tobacco Comments    1/2 PPD     Alcohol Consumption:  Social History     Substance and Sexual Activity   Alcohol Use Yes    Alcohol/week: 6.0 standard drinks of alcohol    Types: 2 Glasses of wine, 4 Cans of beer per week "     Fall Risk Screen:    STEADI Fall Risk Assessment was completed, and patient is at LOW risk for falls.Assessment completed on:7/3/2024    Depression Screenin/3/2024     9:51 AM   PHQ-2/PHQ-9 Depression Screening   Little Interest or Pleasure in Doing Things 0-->not at all   Feeling Down, Depressed or Hopeless 0-->not at all   PHQ-9: Brief Depression Severity Measure Score 0       Health Habits and Functional and Cognitive Screenin/3/2024     9:51 AM   Functional & Cognitive Status   Do you have difficulty preparing food and eating? No   Do you have difficulty bathing yourself, getting dressed or grooming yourself? No   Do you have difficulty using the toilet? No   Do you have difficulty moving around from place to place? No   Do you have trouble with steps or getting out of a bed or a chair? No   Current Diet Well Balanced Diet   Dental Exam Unknown   Eye Exam Unknown   Exercise (times per week) 2 times per week   Current Exercises Include Dancing;No Regular Exercise   Do you need help using the phone?  No   Are you deaf or do you have serious difficulty hearing?  No   Do you need help to go to places out of walking distance? No   Do you need help shopping? No   Do you need help preparing meals?  No   Do you need help with housework?  No   Do you need help with laundry? No   Do you need help taking your medications? No   Do you need help managing money? No   Do you ever drive or ride in a car without wearing a seat belt? No   Have you felt unusual stress, anger or loneliness in the last month? No   Who do you live with? Child   If you need help, do you have trouble finding someone available to you? No   Have you been bothered in the last four weeks by sexual problems? No   Do you have difficulty concentrating, remembering or making decisions? No       Age-appropriate Screening Schedule:  Refer to the list below for future screening recommendations based on patient's age, sex and/or medical  conditions. Orders for these recommended tests are listed in the plan section. The patient has been provided with a written plan.    Health Maintenance   Topic Date Due    DXA SCAN  01/17/2024    MAMMOGRAM  01/18/2024    ZOSTER VACCINE (1 of 2) 07/03/2024 (Originally 2/17/1974)    COVID-19 Vaccine (3 - Pfizer risk series) 07/05/2024 (Originally 5/22/2021)    RSV Vaccine - Adults (1 - 1-dose 60+ series) 07/03/2025 (Originally 2/17/2016)    INFLUENZA VACCINE  08/01/2024    LIPID PANEL  01/02/2025    LUNG CANCER SCREENING  03/30/2025    ANNUAL WELLNESS VISIT  07/03/2025    TDAP/TD VACCINES (2 - Td or Tdap) 05/01/2029    COLORECTAL CANCER SCREENING  08/20/2031    HEPATITIS C SCREENING  Completed    Pneumococcal Vaccine 65+  Completed    PAP SMEAR  Discontinued                  CMS Preventative Services Quick Reference  Risk Factors Identified During Encounter  Immunizations Discussed/Encouraged: Shingrix, COVID19, and RSV (Respiratory Syncytial Virus)  Tobacco Use/Dependance Risk (use dotphrase .tobaccocessation for documentation)  Dental Screening Recommended  Vision Screening Recommended  The above risks/problems have been discussed with the patient.  Pertinent information has been shared with the patient in the After Visit Summary.  An After Visit Summary and PPPS were made available to the patient.    Follow Up:   Next Medicare Wellness visit to be scheduled in 1 year.       Additional E&M Note during same encounter follows:  Patient has multiple medical problems which are significant and separately identifiable that require additional work above and beyond the Medicare Wellness Visit.      Chief Complaint  Medicare Wellness-subsequent    Subjective        HPI  Arline Zaragoza is also being seen today for CPE and chronic health management.     COPD-- continues on symbicort; reports she is using albuterol very sparingly. Denies wheezing or SOA.     HTN-- continues on avapro; denies CP or SOA; reports occasionally  "checking BP at home.     Hx CAD-- on aspirin, statin therapy; follows with cardiology.     Hx osteoporosis-- continues on evista; reports walking for exercise. Still smoking.     Mammogram is due  Dexa scan is due  Colonoscopy UTD 2021; 10 year recall     Reports recent abnormal ALEXIS screening at home; has noticed some numbness in her right toes at night.        Objective   Vital Signs:  /82 (BP Location: Left arm, Patient Position: Sitting, Cuff Size: Adult)   Pulse 78   Ht 165.1 cm (65\")   Wt 57.2 kg (126 lb)   SpO2 98%   BMI 20.97 kg/m²     Physical Exam  Constitutional:       General: She is awake.      Appearance: Normal appearance.   HENT:      Head: Normocephalic and atraumatic.      Right Ear: Hearing and external ear normal.      Left Ear: Hearing and external ear normal.      Nose: Nose normal.      Mouth/Throat:      Lips: Pink.      Mouth: Mucous membranes are moist.      Pharynx: Oropharynx is clear.   Eyes:      Extraocular Movements: Extraocular movements intact.      Conjunctiva/sclera: Conjunctivae normal.      Pupils: Pupils are equal, round, and reactive to light.   Neck:      Vascular: Carotid bruit present.   Cardiovascular:      Rate and Rhythm: Normal rate and regular rhythm.      Pulses: Normal pulses.      Heart sounds: Normal heart sounds. No murmur heard.     No gallop.   Pulmonary:      Effort: Pulmonary effort is normal. No respiratory distress.      Breath sounds: Normal breath sounds. No wheezing or rales.   Musculoskeletal:      Cervical back: Normal range of motion and neck supple.   Skin:     General: Skin is warm and dry.      Capillary Refill: Capillary refill takes less than 2 seconds.   Neurological:      General: No focal deficit present.      Mental Status: She is alert and oriented to person, place, and time. Mental status is at baseline.      Motor: Motor function is intact.      Coordination: Coordination is intact.      Gait: Gait is intact.      Deep Tendon " Reflexes: Reflexes are normal and symmetric.   Psychiatric:         Attention and Perception: Attention normal.         Mood and Affect: Mood normal.         Speech: Speech normal.         Behavior: Behavior normal. Behavior is cooperative.         Thought Content: Thought content normal.         Cognition and Memory: Cognition normal.         Judgment: Judgment normal.          The following data was reviewed by: IGNACIO Lynch on 07/03/2024:  Common labs          9/15/2023    03:24 9/25/2023    14:40 1/2/2024    10:54   Common Labs   Glucose 104  107  116    BUN 9  7  9    Creatinine 0.73  0.63  0.80    Sodium 135  139  138    Potassium 4.0  5.0  4.4    Chloride 99  101  104    Calcium 8.4  10.0  9.6    Total Protein  6.7  6.8    Albumin 3.5  4.6  4.8    Total Bilirubin 0.4  0.6  0.5    Alkaline Phosphatase 49  65  87    AST (SGOT) 27  27  24    ALT (SGPT) 9  20  24    WBC 11.51  7.19  7.41    Hemoglobin 10.5  12.3  13.7    Hematocrit 31.4  36.8  40.2    Platelets 151  266  172    Total Cholesterol   160    Triglycerides   63    HDL Cholesterol   102    LDL Cholesterol    45    Hemoglobin A1C  5.90  5.40      Tobacco Use: High Risk (7/3/2024)    Patient History     Smoking Tobacco Use: Some Days     Smokeless Tobacco Use: Never     Passive Exposure: Not on file     Social History     Substance and Sexual Activity   Alcohol Use Yes    Alcohol/week: 6.0 standard drinks of alcohol    Types: 2 Glasses of wine, 4 Cans of beer per week     Family History   Problem Relation Age of Onset    Vision loss Mother     Skin cancer Father     Cancer Father         Skin cancer    Skin cancer Sister     Depression Daughter     Other Daughter     Other Daughter     Malig Hyperthermia Neg Hx     Breast cancer Neg Hx     Ovarian cancer Neg Hx                 Assessment and Plan   Diagnoses and all orders for this visit:    1. Medicare annual wellness visit, subsequent (Primary)  -     CBC & Differential  -     Comprehensive  metabolic panel    2. Annual physical exam  Assessment & Plan:  Recommended 150-300 minutes weekly exercise  Continue with healthy diet choices  Mammogram ordered  Dexa scan ordered  Colonoscopy UTD 2021  Anticipatory guidance given regarding health prevention/wellness, diet/exercise, tobacco/alcohol/drug education, exercise and wellbeing, covid 19 guidance, vaccination recommendations, and sexual health/STD education.   Recommended bi-yearly dental exams and regular vision examinations.         3. Abnormal ankle brachial index (ALEXIS)  -     Doppler Arterial Multi Level Lower Extremity - Bilateral CAR; Future    4. Tobacco use  Assessment & Plan:  Denies need for cessation use      5. Primary hypertension  Assessment & Plan:  Hypertension is stable and controlled  Continue current treatment regimen.  Blood pressure will be reassessed in 6 months.      6. Osteoporosis, unspecified osteoporosis type, unspecified pathological fracture presence  Assessment & Plan:  Continues on evista  Recommended 150-300 minutes weekly weight bearing exercise      7. Panlobular emphysema  Assessment & Plan:  COPD is stable.    Plan:  Continue same medication/s without change.    Discussed medication dosage, use, side effects, and goals of treatment in detail.    Discussed monitoring symptoms and use of quick-relief medications and maintenance medication..    Patient Treatment Goals:   symptom prevention, minimizing limitation in activity, prevention of exacerbations and use of ER/inpatient care, maintenance of optimal pulmonary function, and minimization of adverse effects of treatment    Followup at the next regular appointment.        8. Prediabetes  Assessment & Plan:  Recommended low glycemic diet choices    Orders:  -     Hemoglobin A1c    9. Mixed hyperlipidemia  Assessment & Plan:   Lipid abnormalities are improving with treatment    Plan:  Continue same medication/s without change.      Discussed medication dosage, use, side  effects, and goals of treatment in detail.    Counseled patient on lifestyle modifications to help control hyperlipidemia.     Patient Treatment Goals:   LDL goal is less than 70    Followup at the next regular appointment.    Orders:  -     TSH Rfx On Abnormal To Free T4  -     Lipid panel    10. Coronary artery disease involving native coronary artery of native heart without angina pectoris  Assessment & Plan:  Continues on statin therapy, aspirin      11. Screening mammogram for breast cancer  -     Mammo Screening Digital Tomosynthesis Bilateral With CAD; Future    12. Post-menopausal  -     DEXA Bone Density Axial; Future    13. Other specified symptoms and signs involving the circulatory and respiratory systems  -     Doppler Arterial Multi Level Lower Extremity - Bilateral CAR; Future    14. S/P carotid endarterectomy  Assessment & Plan:  Following with vascular   Continues on statin, aspirin      15. Malignant neoplasm of right breast in female, estrogen receptor positive, unspecified site of breast  Assessment & Plan:  Recommended close f/u with Dr. Alvares  Mammogram ordered               Follow Up   Return in about 6 months (around 1/3/2025) for Recheck chronic conditions .  Patient was given instructions and counseling regarding her condition or for health maintenance advice. Please see specific information pulled into the AVS if appropriate.

## 2024-07-03 NOTE — ASSESSMENT & PLAN NOTE
COPD is stable.    Plan:  Continue same medication/s without change.    Discussed medication dosage, use, side effects, and goals of treatment in detail.    Discussed monitoring symptoms and use of quick-relief medications and maintenance medication..    Patient Treatment Goals:   symptom prevention, minimizing limitation in activity, prevention of exacerbations and use of ER/inpatient care, maintenance of optimal pulmonary function, and minimization of adverse effects of treatment    Followup at the next regular appointment.

## 2024-07-03 NOTE — ASSESSMENT & PLAN NOTE
Recommended 150-300 minutes weekly exercise  Continue with healthy diet choices  Mammogram ordered  Dexa scan ordered  Colonoscopy UTD 2021  Anticipatory guidance given regarding health prevention/wellness, diet/exercise, tobacco/alcohol/drug education, exercise and wellbeing, covid 19 guidance, vaccination recommendations, and sexual health/STD education.   Recommended bi-yearly dental exams and regular vision examinations.

## 2024-07-22 ENCOUNTER — HOSPITAL ENCOUNTER (OUTPATIENT)
Dept: CARDIOLOGY | Facility: HOSPITAL | Age: 69
Discharge: HOME | End: 2024-07-22
Admitting: NURSE PRACTITIONER
Payer: MEDICARE

## 2024-07-22 DIAGNOSIS — R09.89 OTHER SPECIFIED SYMPTOMS AND SIGNS INVOLVING THE CIRCULATORY AND RESPIRATORY SYSTEMS: ICD-10-CM

## 2024-07-22 DIAGNOSIS — R68.89 ABNORMAL ANKLE BRACHIAL INDEX (ABI): ICD-10-CM

## 2024-07-22 LAB
BH CV LOWER ARTERIAL LEFT ABI RATIO: 0.88
BH CV LOWER ARTERIAL LEFT CALF RATIO: 0.93
BH CV LOWER ARTERIAL LEFT DORSALIS PEDIS SYS MAX: 118
BH CV LOWER ARTERIAL LEFT GREAT TOE SYS MAX: 114
BH CV LOWER ARTERIAL LEFT HIGH THIGH RATIO: 1.09
BH CV LOWER ARTERIAL LEFT HIGH THIGH SYS MAX: 157
BH CV LOWER ARTERIAL LEFT LOW THIGH RATIO: 0.99
BH CV LOWER ARTERIAL LEFT LOW THIGH SYS MAX: 142
BH CV LOWER ARTERIAL LEFT POPLITEAL SYS MAX: 134
BH CV LOWER ARTERIAL LEFT POST TIBIAL SYS MAX: 127
BH CV LOWER ARTERIAL LEFT TBI RATIO: 0.79
BH CV LOWER ARTERIAL RIGHT ABI RATIO: 0.88
BH CV LOWER ARTERIAL RIGHT CALF RATIO: 0.87
BH CV LOWER ARTERIAL RIGHT DORSALIS PEDIS SYS MAX: 126
BH CV LOWER ARTERIAL RIGHT GREAT TOE SYS MAX: 83
BH CV LOWER ARTERIAL RIGHT HIGH THIGH RATIO: 1.1
BH CV LOWER ARTERIAL RIGHT HIGH THIGH SYS MAX: 158
BH CV LOWER ARTERIAL RIGHT LOW THIGH RATIO: 0.94
BH CV LOWER ARTERIAL RIGHT LOW THIGH SYS MAX: 136
BH CV LOWER ARTERIAL RIGHT POPLITEAL SYS MAX: 125
BH CV LOWER ARTERIAL RIGHT POST TIBIAL SYS MAX: 120
BH CV LOWER ARTERIAL RIGHT TBI RATIO: 0.58
UPPER ARTERIAL LEFT ARM BRACHIAL SYS MAX: 139
UPPER ARTERIAL RIGHT ARM BRACHIAL SYS MAX: 144

## 2024-07-22 PROCEDURE — 93923 UPR/LXTR ART STDY 3+ LVLS: CPT

## 2024-07-22 PROCEDURE — 93923 UPR/LXTR ART STDY 3+ LVLS: CPT | Performed by: SURGERY

## 2024-07-22 NOTE — PROGRESS NOTES
Please let patient know--  Her Lilian readings on her legs returned showing some reduced flow arterially. I would recommend she follow up with her vascular specialist for further management of this.

## 2024-08-10 DIAGNOSIS — J44.9 CHRONIC OBSTRUCTIVE PULMONARY DISEASE, UNSPECIFIED COPD TYPE: ICD-10-CM

## 2024-08-12 RX ORDER — BUDESONIDE AND FORMOTEROL FUMARATE DIHYDRATE 160; 4.5 UG/1; UG/1
AEROSOL RESPIRATORY (INHALATION)
Qty: 30.6 G | Refills: 1 | Status: SHIPPED | OUTPATIENT
Start: 2024-08-12

## 2024-08-12 RX ORDER — IRBESARTAN 300 MG/1
300 TABLET ORAL DAILY
Qty: 90 TABLET | Refills: 1 | Status: SHIPPED | OUTPATIENT
Start: 2024-08-12

## 2024-09-12 RX ORDER — RALOXIFENE HYDROCHLORIDE 60 MG/1
60 TABLET, FILM COATED ORAL DAILY
Qty: 90 TABLET | OUTPATIENT
Start: 2024-09-12

## 2024-09-23 ENCOUNTER — TELEPHONE (OUTPATIENT)
Dept: ONCOLOGY | Facility: CLINIC | Age: 69
End: 2024-09-23
Payer: MEDICARE

## 2024-09-23 RX ORDER — RALOXIFENE HYDROCHLORIDE 60 MG/1
60 TABLET, FILM COATED ORAL DAILY
Qty: 30 TABLET | Refills: 0 | Status: SHIPPED | OUTPATIENT
Start: 2024-09-23

## 2024-10-20 NOTE — PROGRESS NOTES
Chief Complaint   Patient presents with    Follow-up       Subjective   10/22/2024 Interval History:  Patient new to me.  Dr. Desai's patient previously.  On Evista.  Tolerating well.  Mammogram and DEXA scan scheduled by primary care in December 2024.  Denies questions or concerns    ONCOLOGIC HISTORY:  Patient is a 67-year-old female who was found to have an abnormality seen on the screening mammogram.  She had skipped mammograms for many years.  Her most recent mammogram was at least 10 years ago.  She denied having any masses that was palpable.    Details are as follows.    January 17, 2022:  FINDINGS:  The breasts are heterogeneously dense, which may obscure small masses.     There is an area of architectural distortion in the upper inner  posterior right breast, which is best appreciated on Tomosynthesis and  on the MLO view. There are no suspicious masses, calcifications, or  areas of architectural distortion in the left breast.    IMPRESSION/RECOMMENDATION(S):  Right breast architectural distortion. Recommend further evaluation with  CC and MLO spot compression and lateral views with Tomosynthesis and  targeted ultrasound.    February 15, 2022:  Right breast diagnostic mammogram and ultrasound  There is a persistent area of architectural distortion in the upper  central posterior right breast, best appreciated with Tomosynthesis.  This area was further assessed with ultrasound.     ULTRASOUND:  Targeted sonographic evaluation of the right breast was performed from  12:00 to 1:00 in the region of the mammographic abnormality. At 11:30, 4  cm from the nipple, there is an incidental 0.6 x 0.3 x 0.7 cm  benign-appearing cluster of cysts. At 12:00, 4 cm from the nipple, there  is a vague irregular hypoechoic mass/area of shadowing measuring 0.7 x  0.6 x 0.6 cm in the region of the mammographic distortion.        IMPRESSION:  Suspicious 0.7 cm mass/area of shadowing at 12:00 in the right breast.  Recommend  further evaluation with ultrasound-guided core needle biopsy  and clip correlation with post biopsy mammogram. If the biopsy clip does  not correspond to the area of mammographic architectural distortion in  the upper central posterior right breast, further evaluation with  stereotactic/Tomosynthesis guided core needle biopsy would then be  recommended.    March 9, 2022: Ultrasound-guided right breast biopsy at 12:00 showed    Breast, Right, 12:00 o'clock, 4 cm from Nipple, Biopsy:  A. Invasive ductal adenocarcinoma, Latonya grade II (tubular grade=3, nuclear grade=2, mitotic grade=1).  B. Microcalcifications are associated with the invasive tumor and non-neoplastic tissue.  C. Ductal carcinoma in-situ is not identified.  D. The tumor measures up to 6.5 mm.    ER: 99%  GA: 25%  HER2/sanchez: 1+ negative  Ki-67 3%    March 17, 2022: MRI of the bilateral breast    IMPRESSION:  1. Biopsy-proven malignancy in the right breast in the posterior  one-third at the 12-o'clock position measuring on the order of 1.1 cm in  greatest dimension with the bowtie-shaped metallic clip positioned on  the order of 0.7 cm superior and slightly medial to the epicenter of the  suspected residual malignancy within a hematoma cavity that measures on  the order of 2 cm in greatest dimension. No other suspicious findings  are seen within the right breast and there is no evidence for right  axillary adenopathy.  2. There are no findings suspicious for malignancy in the left breast.     April 28, 2022: Right breast lumpectomy with sentinel lymph node surgery    Synoptic Checklist  INVASIVE CARCINOMA OF THE BREAST: Resection (INVASIVE CARCINOMA OF THE BREAST: COMPLETE EXCISION - All Specimens)  Procedure Excision (less than total mastectomy)  Specimen Laterality Right  .  TUMOR  Tumor Site Clock position  12 o'clock  Histologic Type Invasive carcinoma of no special type (ductal)  Histologic Grade ([place] Histologic Score)  Glandular (Acinar)  / Tubular Differentiation Score 3  Nuclear Pleomorphism  .  1. Breast, Right.  Mitotic Rate Score 1  Overall Grade Grade 2 (scores of 6 or 7)  Tumor Size Greatest dimension of largest invasive focus (Millimeters): 5 mm  Additional Dimension (Millimeters) 5 mm  5 mm  Tumor Focality Single focus of invasive carcinoma  Ductal Carcinoma In Situ (DCIS) Not identified  Lobular Carcinoma In Situ (LCIS) Not identified  Lymphovascular Invasion Not identified  Dermal Lymphovascular Invasion No skin present  Microcalcifications Present in invasive carcinoma  Present in non-neoplastic tissue  Treatment Effect in the Breast No known presurgical therapy  .  MARGINS  Margin Status for Invasive Carcinoma All margins negative for invasive carcinoma  Distance from Invasive Carcinoma to Closest Margin 8.4 mm  Closest Margin(s) to Invasive Carcinoma Inferior  Distance from Invasive Carcinoma to Anterior Margin 11 mm  Distance from Invasive Carcinoma to Posterior Margin 18 mm  Distance from Invasive Carcinoma to Superior Margin 17 mm  Distance from Invasive Carcinoma to Inferior Margin 8.4 mm  Distance from Invasive Carcinoma to Medial Margin 11 mm  Distance from Invasive Carcinoma to Lateral Margin 22 mm  .  REGIONAL LYMPH NODES  Regional Lymph Node Status All regional lymph nodes negative for tumor  Total Number of Lymph Nodes Examined (sentinel and non-sentinel) 7  Number of [place] Nodes Examined 7  .       Current Outpatient Medications on File Prior to Visit   Medication Sig Dispense Refill    albuterol sulfate  (90 Base) MCG/ACT inhaler Inhale 2 puffs Every 4 (Four) Hours As Needed for Wheezing.      aspirin 81 MG EC tablet Take 1 tablet by mouth Daily.      atorvastatin (LIPITOR) 20 MG tablet TAKE ONE TABLET BY MOUTH ONCE NIGHTLY 90 tablet 1    budesonide-formoterol (Symbicort) 160-4.5 MCG/ACT inhaler INHALE TWO PUFFS BY MOUTH TWICE A [id number] 30.6 g 1    irbesartan (AVAPRO) 300 MG tablet TAKE 1 TABLET BY MOUTH DAILY 90  "tablet 1    [DISCONTINUED] raloxifene (EVISTA) 60 MG tablet Take 1 tablet by mouth Daily. 30 tablet 0     No current facility-administered medications on file prior to visit.        ALLERGIES:  No Known Allergies       I have reviewed Past Medical, Surgical History, Social History, Meds and Allergies.     REVIEW OF SYSTEMS:  See interval History          Objective     Vitals:    10/22/24 1403   BP: 136/76   Pulse: 79   Resp: 16   Temp: 98.1 °F (36.7 °C)   TempSrc: Oral   SpO2: 98%   Weight: 57.8 kg (127 lb 8 oz)   Height: 165.1 cm (65\")   PainSc: 0-No pain           10/22/2024     2:12 PM   Current Status   ECOG score 0       Physical Exam  Constitutional:       Appearance: Normal appearance.   HENT:      Head: Normocephalic and atraumatic.      Mouth/Throat:      Mouth: Mucous membranes are moist.      Pharynx: Oropharynx is clear.   Eyes:      Extraocular Movements: Extraocular movements intact.      Pupils: Pupils are equal, round, and reactive to light.   Cardiovascular:      Rate and Rhythm: Normal rate and regular rhythm.   Pulmonary:      Effort: Pulmonary effort is normal.      Breath sounds: Normal breath sounds.   Chest:       Abdominal:      General: Bowel sounds are normal.      Palpations: Abdomen is soft.   Musculoskeletal:      Cervical back: Normal range of motion and neck supple.   Neurological:      General: No focal deficit present.      Mental Status: She is alert and oriented to person, place, and time.   Psychiatric:         Mood and Affect: Mood normal.         Behavior: Behavior normal.                RECENT LABS:  CBC:      Lab 10/22/24  1340   WBC 9.50   HEMOGLOBIN 13.3   HEMATOCRIT 39.9   PLATELETS 197   NEUTROS ABS 6.24   IMMATURE GRANS (ABS) 0.04   LYMPHS ABS 2.52   MONOS ABS 0.62   EOS ABS 0.04   MCV 95.7         Assessment & Plan   *Stage Ia (pT1a pN0) invasive ductal carcinoma of the right breast, ER/[place] positive   4/28/2022 status post right breast lumpectomy with sentinel lymph node " biopsy.  Final path-invasive carcinoma of no special type (ductal), overall grade 2, tumor size 5 mm, no DCIS or LCIS.  Margins negative.  pT1a pN0.  ER 99%, HI 25%, HER2 1+, Ki-67 3%  Patient's Oncotype DX  shows a score of 20  July 2022-status post adjuvant radiation  August 2022-started tamoxifen due to baseline osteoporosis  10/6/2022: Tolerating tamoxifen extremely well  10/20/2022: Patient calling reporting significant depression (reports no history of depression prior), hair loss and weight gain.  Patient started to hold tamoxifen.  12/6/2022: Patient reviewed back today with stabilization of weight and improved mood.  Unfortunately patient has significant underlying osteoporosis and we have not been able to give her Prolia due to ongoing dental issues needing corrected.  I will need to discuss alternative treatment options of any with Dr. Desai.  February 16, 2023: Patient currently is not receiving aromatase inhibitor given the fact that she has osteoporosis which is severe and has had fractures and currently will continue Evista.    October 2024: Patient establish care with me.  Currently on raloxifene    *Osteoporosis  Bone density done January 17, 2022 did show osteoporosis  Dr. Desai had discussed about consideration of starting Prolia but patient refuses Prolia.  She also has gum issues and she has to have many of the teeth pulled.  5/2023 patient prefers to stay on Evista.    DEXA scan scheduled for Dec 30, 2024.     *Genetics  Patient had history of cervical cancer when she was 28/28 years  Patient has variation of uncertain significance of the BRCA2 gene.  Clinical significance of the variant is uncertain.  Familial variation of uncertain significance testing is not recommended.    Plan  Continue Evista.  Refilled today.  DEXA  and mammo scheduled for Dec 30, 2024, ordered by PCP  Follow-up in 6 months with CBC, CMP.        Halley Baer MD

## 2024-10-22 ENCOUNTER — OFFICE VISIT (OUTPATIENT)
Dept: ONCOLOGY | Facility: CLINIC | Age: 69
End: 2024-10-22
Payer: MEDICARE

## 2024-10-22 ENCOUNTER — LAB (OUTPATIENT)
Dept: LAB | Facility: HOSPITAL | Age: 69
End: 2024-10-22
Payer: MEDICARE

## 2024-10-22 VITALS
HEIGHT: 65 IN | BODY MASS INDEX: 21.24 KG/M2 | WEIGHT: 127.5 LBS | TEMPERATURE: 98.1 F | HEART RATE: 79 BPM | RESPIRATION RATE: 16 BRPM | DIASTOLIC BLOOD PRESSURE: 76 MMHG | SYSTOLIC BLOOD PRESSURE: 136 MMHG | OXYGEN SATURATION: 98 %

## 2024-10-22 DIAGNOSIS — C50.911 MALIGNANT NEOPLASM OF RIGHT BREAST IN FEMALE, ESTROGEN RECEPTOR POSITIVE, UNSPECIFIED SITE OF BREAST: ICD-10-CM

## 2024-10-22 DIAGNOSIS — C50.911 MALIGNANT NEOPLASM OF RIGHT BREAST IN FEMALE, ESTROGEN RECEPTOR POSITIVE, UNSPECIFIED SITE OF BREAST: Primary | ICD-10-CM

## 2024-10-22 DIAGNOSIS — Z79.810 USE OF RALOXIFENE (EVISTA): ICD-10-CM

## 2024-10-22 DIAGNOSIS — Z17.0 MALIGNANT NEOPLASM OF RIGHT BREAST IN FEMALE, ESTROGEN RECEPTOR POSITIVE, UNSPECIFIED SITE OF BREAST: Primary | ICD-10-CM

## 2024-10-22 DIAGNOSIS — Z17.0 MALIGNANT NEOPLASM OF RIGHT BREAST IN FEMALE, ESTROGEN RECEPTOR POSITIVE, UNSPECIFIED SITE OF BREAST: ICD-10-CM

## 2024-10-22 LAB
ALBUMIN SERPL-MCNC: 4.3 G/DL (ref 3.5–5.2)
ALBUMIN/GLOB SERPL: 1.8 G/DL
ALP SERPL-CCNC: 80 U/L (ref 39–117)
ALT SERPL W P-5'-P-CCNC: 19 U/L (ref 1–33)
ANION GAP SERPL CALCULATED.3IONS-SCNC: 12.6 MMOL/L (ref 5–15)
AST SERPL-CCNC: 25 U/L (ref 1–32)
BASOPHILS # BLD AUTO: 0.04 10*3/MM3 (ref 0–0.2)
BASOPHILS NFR BLD AUTO: 0.4 % (ref 0–1.5)
BILIRUB SERPL-MCNC: 0.3 MG/DL (ref 0–1.2)
BUN SERPL-MCNC: 12 MG/DL (ref 8–23)
BUN/CREAT SERPL: 15.6 (ref 7–25)
CALCIUM SPEC-SCNC: 9.2 MG/DL (ref 8.6–10.5)
CHLORIDE SERPL-SCNC: 101 MMOL/L (ref 98–107)
CO2 SERPL-SCNC: 23.4 MMOL/L (ref 22–29)
CREAT SERPL-MCNC: 0.77 MG/DL (ref 0.57–1)
DEPRECATED RDW RBC AUTO: 45.9 FL (ref 37–54)
EGFRCR SERPLBLD CKD-EPI 2021: 84.1 ML/MIN/1.73
EOSINOPHIL # BLD AUTO: 0.04 10*3/MM3 (ref 0–0.4)
EOSINOPHIL NFR BLD AUTO: 0.4 % (ref 0.3–6.2)
ERYTHROCYTE [DISTWIDTH] IN BLOOD BY AUTOMATED COUNT: 13.1 % (ref 12.3–15.4)
GLOBULIN UR ELPH-MCNC: 2.4 GM/DL
GLUCOSE SERPL-MCNC: 195 MG/DL (ref 65–99)
HCT VFR BLD AUTO: 39.9 % (ref 34–46.6)
HGB BLD-MCNC: 13.3 G/DL (ref 12–15.9)
IMM GRANULOCYTES # BLD AUTO: 0.04 10*3/MM3 (ref 0–0.05)
IMM GRANULOCYTES NFR BLD AUTO: 0.4 % (ref 0–0.5)
LYMPHOCYTES # BLD AUTO: 2.52 10*3/MM3 (ref 0.7–3.1)
LYMPHOCYTES NFR BLD AUTO: 26.5 % (ref 19.6–45.3)
MCH RBC QN AUTO: 31.9 PG (ref 26.6–33)
MCHC RBC AUTO-ENTMCNC: 33.3 G/DL (ref 31.5–35.7)
MCV RBC AUTO: 95.7 FL (ref 79–97)
MONOCYTES # BLD AUTO: 0.62 10*3/MM3 (ref 0.1–0.9)
MONOCYTES NFR BLD AUTO: 6.5 % (ref 5–12)
NEUTROPHILS NFR BLD AUTO: 6.24 10*3/MM3 (ref 1.7–7)
NEUTROPHILS NFR BLD AUTO: 65.8 % (ref 42.7–76)
NRBC BLD AUTO-RTO: 0 /100 WBC (ref 0–0.2)
PLATELET # BLD AUTO: 197 10*3/MM3 (ref 140–450)
PMV BLD AUTO: 10.6 FL (ref 6–12)
POTASSIUM SERPL-SCNC: 4.1 MMOL/L (ref 3.5–5.2)
PROT SERPL-MCNC: 6.7 G/DL (ref 6–8.5)
RBC # BLD AUTO: 4.17 10*6/MM3 (ref 3.77–5.28)
SODIUM SERPL-SCNC: 137 MMOL/L (ref 136–145)
WBC NRBC COR # BLD AUTO: 9.5 10*3/MM3 (ref 3.4–10.8)

## 2024-10-22 PROCEDURE — 80053 COMPREHEN METABOLIC PANEL: CPT

## 2024-10-22 PROCEDURE — 85025 COMPLETE CBC W/AUTO DIFF WBC: CPT

## 2024-10-22 PROCEDURE — 36415 COLL VENOUS BLD VENIPUNCTURE: CPT

## 2024-10-22 RX ORDER — RALOXIFENE HYDROCHLORIDE 60 MG/1
60 TABLET, FILM COATED ORAL DAILY
Qty: 90 TABLET | Refills: 11 | Status: SHIPPED | OUTPATIENT
Start: 2024-10-22 | End: 2025-01-20

## 2024-11-08 RX ORDER — ATORVASTATIN CALCIUM 20 MG/1
TABLET, FILM COATED ORAL
Qty: 90 TABLET | Refills: 0 | Status: SHIPPED | OUTPATIENT
Start: 2024-11-08

## 2024-11-12 ENCOUNTER — HOSPITAL ENCOUNTER (OUTPATIENT)
Facility: HOSPITAL | Age: 69
Discharge: HOME | End: 2024-11-12
Admitting: NURSE PRACTITIONER
Payer: MEDICARE

## 2024-11-12 ENCOUNTER — OFFICE VISIT (OUTPATIENT)
Age: 69
End: 2024-11-12
Payer: MEDICARE

## 2024-11-12 VITALS
WEIGHT: 127 LBS | DIASTOLIC BLOOD PRESSURE: 68 MMHG | HEIGHT: 65 IN | BODY MASS INDEX: 21.16 KG/M2 | SYSTOLIC BLOOD PRESSURE: 126 MMHG

## 2024-11-12 DIAGNOSIS — I73.9 PERIPHERAL ARTERIAL DISEASE: ICD-10-CM

## 2024-11-12 DIAGNOSIS — Z72.0 TOBACCO USE: ICD-10-CM

## 2024-11-12 DIAGNOSIS — Z98.890 S/P CAROTID ENDARTERECTOMY: ICD-10-CM

## 2024-11-12 DIAGNOSIS — I65.23 BILATERAL CAROTID ARTERY STENOSIS: Primary | ICD-10-CM

## 2024-11-12 DIAGNOSIS — I65.23 CAROTID STENOSIS, BILATERAL: ICD-10-CM

## 2024-11-12 DIAGNOSIS — I65.23 BILATERAL CAROTID ARTERY STENOSIS: ICD-10-CM

## 2024-11-12 LAB
BH CV XLRA MEAS LEFT CAROTID BULB EDV: 0 CM/SEC
BH CV XLRA MEAS LEFT CAROTID BULB PSV: 0 CM/SEC
BH CV XLRA MEAS LEFT DIST CCA EDV: 0 CM/SEC
BH CV XLRA MEAS LEFT DIST CCA PSV: 0 CM/SEC
BH CV XLRA MEAS LEFT DIST ICA EDV: 0 CM/SEC
BH CV XLRA MEAS LEFT DIST ICA PSV: 0 CM/SEC
BH CV XLRA MEAS LEFT MID CCA EDV: 0 CM/SEC
BH CV XLRA MEAS LEFT MID CCA PSV: 0 CM/SEC
BH CV XLRA MEAS LEFT MID ICA EDV: 0 CM/SEC
BH CV XLRA MEAS LEFT MID ICA PSV: 0 CM/SEC
BH CV XLRA MEAS LEFT PROX CCA EDV: 0 CM/SEC
BH CV XLRA MEAS LEFT PROX CCA PSV: 0 CM/SEC
BH CV XLRA MEAS LEFT PROX ECA EDV: -21.7 CM/SEC
BH CV XLRA MEAS LEFT PROX ECA PSV: -78.5 CM/SEC
BH CV XLRA MEAS LEFT PROX ICA EDV: 0 CM/SEC
BH CV XLRA MEAS LEFT PROX ICA PSV: 0 CM/SEC
BH CV XLRA MEAS LEFT PROX SCLA PSV: 154.9 CM/SEC
BH CV XLRA MEAS LEFT VERTEBRAL A EDV: 27.3 CM/SEC
BH CV XLRA MEAS LEFT VERTEBRAL A PSV: 91.8 CM/SEC
BH CV XLRA MEAS RIGHT CAROTID BULB EDV: -45 CM/SEC
BH CV XLRA MEAS RIGHT CAROTID BULB PSV: -125.1 CM/SEC
BH CV XLRA MEAS RIGHT DIST CCA EDV: -34 CM/SEC
BH CV XLRA MEAS RIGHT DIST CCA PSV: -113 CM/SEC
BH CV XLRA MEAS RIGHT DIST ICA EDV: -47.5 CM/SEC
BH CV XLRA MEAS RIGHT DIST ICA PSV: -118.1 CM/SEC
BH CV XLRA MEAS RIGHT ICA/CCA RATIO: 1.11
BH CV XLRA MEAS RIGHT MID CCA EDV: 34.3 CM/SEC
BH CV XLRA MEAS RIGHT MID CCA PSV: 146.8 CM/SEC
BH CV XLRA MEAS RIGHT MID ICA EDV: -51.3 CM/SEC
BH CV XLRA MEAS RIGHT MID ICA PSV: -122 CM/SEC
BH CV XLRA MEAS RIGHT PROX CCA EDV: 42.5 CM/SEC
BH CV XLRA MEAS RIGHT PROX CCA PSV: 190.7 CM/SEC
BH CV XLRA MEAS RIGHT PROX ECA EDV: -32.9 CM/SEC
BH CV XLRA MEAS RIGHT PROX ECA PSV: -163.5 CM/SEC
BH CV XLRA MEAS RIGHT PROX ICA EDV: -32.9 CM/SEC
BH CV XLRA MEAS RIGHT PROX ICA PSV: -107.5 CM/SEC
BH CV XLRA MEAS RIGHT PROX SCLA PSV: 168.7 CM/SEC
BH CV XLRA MEAS RIGHT VERTEBRAL A EDV: -29.1 CM/SEC
BH CV XLRA MEAS RIGHT VERTEBRAL A PSV: -96.8 CM/SEC
LEFT ARM BP: NORMAL MMHG
RIGHT ARM BP: NORMAL MMHG

## 2024-11-12 PROCEDURE — 1159F MED LIST DOCD IN RCRD: CPT | Performed by: NURSE PRACTITIONER

## 2024-11-12 PROCEDURE — 99214 OFFICE O/P EST MOD 30 MIN: CPT | Performed by: NURSE PRACTITIONER

## 2024-11-12 PROCEDURE — 1160F RVW MEDS BY RX/DR IN RCRD: CPT | Performed by: NURSE PRACTITIONER

## 2024-11-12 PROCEDURE — 93880 EXTRACRANIAL BILAT STUDY: CPT | Performed by: SURGERY

## 2024-11-12 PROCEDURE — 3078F DIAST BP <80 MM HG: CPT | Performed by: NURSE PRACTITIONER

## 2024-11-12 PROCEDURE — 93880 EXTRACRANIAL BILAT STUDY: CPT

## 2024-11-12 PROCEDURE — G2211 COMPLEX E/M VISIT ADD ON: HCPCS | Performed by: NURSE PRACTITIONER

## 2024-11-12 PROCEDURE — 3074F SYST BP LT 130 MM HG: CPT | Performed by: NURSE PRACTITIONER

## 2024-12-11 ENCOUNTER — TELEPHONE (OUTPATIENT)
Dept: INTERNAL MEDICINE | Facility: CLINIC | Age: 69
End: 2024-12-11
Payer: MEDICARE

## 2025-01-07 ENCOUNTER — TELEPHONE (OUTPATIENT)
Dept: INTERNAL MEDICINE | Facility: CLINIC | Age: 70
End: 2025-01-07
Payer: MEDICARE

## 2025-02-05 RX ORDER — IRBESARTAN 300 MG/1
300 TABLET ORAL DAILY
Qty: 90 TABLET | Refills: 0 | Status: SHIPPED | OUTPATIENT
Start: 2025-02-05

## 2025-02-05 RX ORDER — ATORVASTATIN CALCIUM 20 MG/1
TABLET, FILM COATED ORAL
Qty: 90 TABLET | Refills: 0 | Status: SHIPPED | OUTPATIENT
Start: 2025-02-05

## 2025-02-06 DIAGNOSIS — J44.9 CHRONIC OBSTRUCTIVE PULMONARY DISEASE, UNSPECIFIED COPD TYPE: ICD-10-CM

## 2025-02-06 RX ORDER — BUDESONIDE AND FORMOTEROL FUMARATE DIHYDRATE 160; 4.5 UG/1; UG/1
2 AEROSOL RESPIRATORY (INHALATION) 2 TIMES DAILY
Qty: 30.6 G | Refills: 0 | Status: SHIPPED | OUTPATIENT
Start: 2025-02-06

## 2025-02-10 ENCOUNTER — OFFICE VISIT (OUTPATIENT)
Dept: INTERNAL MEDICINE | Facility: CLINIC | Age: 70
End: 2025-02-10
Payer: MEDICARE

## 2025-02-10 VITALS
OXYGEN SATURATION: 96 % | HEART RATE: 68 BPM | WEIGHT: 126 LBS | SYSTOLIC BLOOD PRESSURE: 130 MMHG | DIASTOLIC BLOOD PRESSURE: 70 MMHG | HEIGHT: 65 IN | BODY MASS INDEX: 20.99 KG/M2

## 2025-02-10 DIAGNOSIS — C50.911 MALIGNANT NEOPLASM OF RIGHT BREAST IN FEMALE, ESTROGEN RECEPTOR POSITIVE, UNSPECIFIED SITE OF BREAST: Chronic | ICD-10-CM

## 2025-02-10 DIAGNOSIS — M81.0 OSTEOPOROSIS, UNSPECIFIED OSTEOPOROSIS TYPE, UNSPECIFIED PATHOLOGICAL FRACTURE PRESENCE: Chronic | ICD-10-CM

## 2025-02-10 DIAGNOSIS — I25.10 CORONARY ARTERY DISEASE INVOLVING NATIVE CORONARY ARTERY OF NATIVE HEART WITHOUT ANGINA PECTORIS: Chronic | ICD-10-CM

## 2025-02-10 DIAGNOSIS — Z17.0 MALIGNANT NEOPLASM OF RIGHT BREAST IN FEMALE, ESTROGEN RECEPTOR POSITIVE, UNSPECIFIED SITE OF BREAST: Chronic | ICD-10-CM

## 2025-02-10 DIAGNOSIS — E78.2 MIXED HYPERLIPIDEMIA: Chronic | ICD-10-CM

## 2025-02-10 DIAGNOSIS — Z98.890 S/P CAROTID ENDARTERECTOMY: Chronic | ICD-10-CM

## 2025-02-10 DIAGNOSIS — Z72.0 TOBACCO USE: Chronic | ICD-10-CM

## 2025-02-10 DIAGNOSIS — Z23 NEED FOR INFLUENZA VACCINATION: ICD-10-CM

## 2025-02-10 DIAGNOSIS — J43.1 PANLOBULAR EMPHYSEMA: Chronic | ICD-10-CM

## 2025-02-10 DIAGNOSIS — Z59.9 FINANCIAL DIFFICULTIES: ICD-10-CM

## 2025-02-10 DIAGNOSIS — I65.23 BILATERAL CAROTID ARTERY STENOSIS: Chronic | ICD-10-CM

## 2025-02-10 DIAGNOSIS — I10 PRIMARY HYPERTENSION: Primary | Chronic | ICD-10-CM

## 2025-02-10 DIAGNOSIS — R73.03 PREDIABETES: Chronic | ICD-10-CM

## 2025-02-10 PROBLEM — Z00.00 ANNUAL PHYSICAL EXAM: Status: RESOLVED | Noted: 2024-07-03 | Resolved: 2025-02-10

## 2025-02-10 PROBLEM — E87.1 HYPONATREMIA: Status: RESOLVED | Noted: 2023-09-14 | Resolved: 2025-02-10

## 2025-02-10 LAB
ALBUMIN SERPL-MCNC: 4.5 G/DL (ref 3.5–5.2)
ALBUMIN/GLOB SERPL: 1.9 G/DL
ALP SERPL-CCNC: 94 U/L (ref 39–117)
ALT SERPL-CCNC: 19 U/L (ref 1–33)
AST SERPL-CCNC: 28 U/L (ref 1–32)
BASOPHILS # BLD AUTO: 0.05 10*3/MM3 (ref 0–0.2)
BASOPHILS NFR BLD AUTO: 0.6 % (ref 0–1.5)
BILIRUB SERPL-MCNC: 0.4 MG/DL (ref 0–1.2)
BUN SERPL-MCNC: 8 MG/DL (ref 8–23)
BUN/CREAT SERPL: 10.8 (ref 7–25)
CALCIUM SERPL-MCNC: 9.5 MG/DL (ref 8.6–10.5)
CHLORIDE SERPL-SCNC: 105 MMOL/L (ref 98–107)
CHOLEST SERPL-MCNC: 164 MG/DL (ref 0–200)
CO2 SERPL-SCNC: 24.5 MMOL/L (ref 22–29)
CREAT SERPL-MCNC: 0.74 MG/DL (ref 0.57–1)
EGFRCR SERPLBLD CKD-EPI 2021: 88.3 ML/MIN/1.73
EOSINOPHIL # BLD AUTO: 0.06 10*3/MM3 (ref 0–0.4)
EOSINOPHIL NFR BLD AUTO: 0.7 % (ref 0.3–6.2)
ERYTHROCYTE [DISTWIDTH] IN BLOOD BY AUTOMATED COUNT: 12.5 % (ref 12.3–15.4)
GLOBULIN SER CALC-MCNC: 2.4 GM/DL
GLUCOSE SERPL-MCNC: 105 MG/DL (ref 65–99)
HBA1C MFR BLD: 5.6 % (ref 4.8–5.6)
HCT VFR BLD AUTO: 40.8 % (ref 34–46.6)
HDLC SERPL-MCNC: 89 MG/DL (ref 40–60)
HGB BLD-MCNC: 13.9 G/DL (ref 12–15.9)
IMM GRANULOCYTES # BLD AUTO: 0.02 10*3/MM3 (ref 0–0.05)
IMM GRANULOCYTES NFR BLD AUTO: 0.2 % (ref 0–0.5)
LDLC SERPL CALC-MCNC: 59 MG/DL (ref 0–100)
LYMPHOCYTES # BLD AUTO: 1.9 10*3/MM3 (ref 0.7–3.1)
LYMPHOCYTES NFR BLD AUTO: 23.6 % (ref 19.6–45.3)
MCH RBC QN AUTO: 31.9 PG (ref 26.6–33)
MCHC RBC AUTO-ENTMCNC: 34.1 G/DL (ref 31.5–35.7)
MCV RBC AUTO: 93.6 FL (ref 79–97)
MONOCYTES # BLD AUTO: 0.61 10*3/MM3 (ref 0.1–0.9)
MONOCYTES NFR BLD AUTO: 7.6 % (ref 5–12)
NEUTROPHILS # BLD AUTO: 5.4 10*3/MM3 (ref 1.7–7)
NEUTROPHILS NFR BLD AUTO: 67.3 % (ref 42.7–76)
NRBC BLD AUTO-RTO: 0 /100 WBC (ref 0–0.2)
PLATELET # BLD AUTO: 217 10*3/MM3 (ref 140–450)
POTASSIUM SERPL-SCNC: 4.6 MMOL/L (ref 3.5–5.2)
PROT SERPL-MCNC: 6.9 G/DL (ref 6–8.5)
RBC # BLD AUTO: 4.36 10*6/MM3 (ref 3.77–5.28)
SODIUM SERPL-SCNC: 141 MMOL/L (ref 136–145)
TRIGL SERPL-MCNC: 89 MG/DL (ref 0–150)
TSH SERPL DL<=0.005 MIU/L-ACNC: 1.04 UIU/ML (ref 0.27–4.2)
VLDLC SERPL CALC-MCNC: 16 MG/DL (ref 5–40)
WBC # BLD AUTO: 8.04 10*3/MM3 (ref 3.4–10.8)

## 2025-02-10 PROCEDURE — 99214 OFFICE O/P EST MOD 30 MIN: CPT | Performed by: NURSE PRACTITIONER

## 2025-02-10 PROCEDURE — 3078F DIAST BP <80 MM HG: CPT | Performed by: NURSE PRACTITIONER

## 2025-02-10 PROCEDURE — G2211 COMPLEX E/M VISIT ADD ON: HCPCS | Performed by: NURSE PRACTITIONER

## 2025-02-10 PROCEDURE — 1160F RVW MEDS BY RX/DR IN RCRD: CPT | Performed by: NURSE PRACTITIONER

## 2025-02-10 PROCEDURE — 1159F MED LIST DOCD IN RCRD: CPT | Performed by: NURSE PRACTITIONER

## 2025-02-10 PROCEDURE — 3075F SYST BP GE 130 - 139MM HG: CPT | Performed by: NURSE PRACTITIONER

## 2025-02-10 PROCEDURE — 90662 IIV NO PRSV INCREASED AG IM: CPT | Performed by: NURSE PRACTITIONER

## 2025-02-10 PROCEDURE — 1126F AMNT PAIN NOTED NONE PRSNT: CPT | Performed by: NURSE PRACTITIONER

## 2025-02-10 PROCEDURE — G0008 ADMIN INFLUENZA VIRUS VAC: HCPCS | Performed by: NURSE PRACTITIONER

## 2025-02-10 SDOH — ECONOMIC STABILITY - INCOME SECURITY: PROBLEM RELATED TO HOUSING AND ECONOMIC CIRCUMSTANCES, UNSPECIFIED: Z59.9

## 2025-02-10 NOTE — ASSESSMENT & PLAN NOTE
A1c today  Continue with low glycemic diet choices including reducing refined carbohydrates and simple sugars in diet. Recommended 150 minutes weekly exercise or as tolerated.

## 2025-02-10 NOTE — ASSESSMENT & PLAN NOTE
Hypertension is stable and controlled  Continue current treatment regimen.  Dietary sodium restriction.  Regular aerobic exercise.  Stop smoking.  Blood pressure will be reassessed in 6 months.

## 2025-02-10 NOTE — ASSESSMENT & PLAN NOTE
Continues on evista  Current recommendations according to the current Physical Activity Guidelines for Americans: adults need 150-300 minutes of physical exercise weekly. It is also recommended to perform two sessions of full body strength training exercise weekly which includes all major muscle groups including legs, hips, back, abdomen, chest, shoulders, and arms.   Dexa pending

## 2025-02-10 NOTE — PROGRESS NOTES
"Chief Complaint  Hyperlipidemia and Hypertension    Subjective        Arline Zaragoza presents to [facility]  History of Present Illness  This is a 70 y/o female presenting to office for chronic health f/u    HTN-- continues on irbesartan; denies CP or SOA.     HLD-- continues on statin therapy; reports some exercise inside; reports following healthy diet choices.     Hx carotid stenosis, PAD-- following with vascular; cont on aspirin, statin therapy; continues with every day tobacco use.     Hx breast cax; continues following with CBC; mammogram pending 2/2025; cont on evista.     Hx COPD-- on symbicort BID; denies SOA/CP; reports she is rarely using her albuterol.       Objective   Vital Signs:  /70 (BP Location: Left arm, Patient Position: Sitting, Cuff Size: Adult)   Pulse 68   Ht 165.1 cm (65\")   Wt 57.2 kg (126 lb)   SpO2 96%   BMI 20.97 kg/m²   Estimated body mass index is 20.97 kg/m² as calculated from the following:    Height as of this encounter: 165.1 cm (65\").    Weight as of this encounter: 57.2 kg (126 lb).    BMI is within normal parameters. No other follow-up for BMI required.      Physical Exam  Constitutional:       General: She is awake.      Appearance: Normal appearance.   HENT:      Head: Normocephalic and atraumatic.      Right Ear: Hearing and external ear normal.      Left Ear: Hearing and external ear normal.      Nose: Nose normal.      Mouth/Throat:      Lips: Pink.      Mouth: Mucous membranes are moist.      Pharynx: Oropharynx is clear.   Eyes:      Extraocular Movements: Extraocular movements intact.      Conjunctiva/sclera: Conjunctivae normal.      Pupils: Pupils are equal, round, and reactive to light.   Cardiovascular:      Rate and Rhythm: Normal rate and regular rhythm.      Pulses: Normal pulses.      Heart sounds: Normal heart sounds. No murmur heard.     No gallop.   Pulmonary:      Effort: Pulmonary effort is normal. No respiratory " distress.      Breath sounds: Normal breath sounds. No stridor. No wheezing, rhonchi or rales.   Musculoskeletal:      Cervical back: Normal range of motion and neck supple.   Skin:     General: Skin is warm and dry.      Capillary Refill: Capillary refill takes less than 2 seconds.   Neurological:      General: No focal deficit present.      Mental Status: She is alert and oriented to person, place, and time. Mental status is at baseline.      Motor: Motor function is intact.      Coordination: Coordination is intact.      Gait: Gait is intact.      Deep Tendon Reflexes: Reflexes are normal and symmetric.   Psychiatric:         Attention and Perception: Attention normal.         Mood and Affect: Mood normal.         Speech: Speech normal.         Behavior: Behavior normal. Behavior is cooperative.         Thought Content: Thought content normal.         Cognition and Memory: Cognition normal.         Judgment: Judgment normal.        Result Review :  The following data was reviewed by: IGNACIO Lynch on 02/10/2025:  Common labs          7/3/2024    10:32 10/22/2024    13:40   Common Labs   Glucose 111  195    BUN 10  12    Creatinine 0.79  0.77    Sodium 140  137    Potassium 5.2  4.1    Chloride 102  101    Calcium 9.9  9.2    Total Protein 6.9     Albumin 4.8  4.3    Total Bilirubin 0.5  0.3    Alkaline Phosphatase 91  80    AST (SGOT) 25  25    ALT (SGPT) 22  19    WBC 9.59  9.50    Hemoglobin 13.8  13.3    Hematocrit 41.9  39.9    Platelets 218  197    Total Cholesterol 181     Triglycerides 89     HDL Cholesterol 105     LDL Cholesterol  60     Hemoglobin A1C 5.60       Tobacco Use: High Risk (2/10/2025)    Patient History     Smoking Tobacco Use: Every Day     Smokeless Tobacco Use: Never     Passive Exposure: Not on file     Social History     Substance and Sexual Activity   Alcohol Use Yes    Alcohol/week: 6.0 standard drinks of alcohol    Types: 2 Glasses of wine, 4 Cans of beer per week     Family  History   Problem Relation Age of Onset    Vision loss Mother     Skin cancer Father     Cancer Father         Skin cancer    Skin cancer Sister     Depression Daughter     [place] Hyperthermia Neg Hx     Breast cancer Neg Hx     Ovarian cancer Neg Hx       Doppler Arterial Multi Level Lower Extremity - Bilateral CAR (07/22/2024 7:41 AM)          Assessment and Plan   Diagnoses and all orders for this visit:    1. Primary hypertension (Primary)  Assessment & Plan:  Hypertension is stable and controlled  Continue current treatment regimen.  Dietary sodium restriction.  Regular aerobic exercise.  Stop smoking.  Blood pressure will be reassessed in 6 months.    Orders:  -     CBC & Differential  -     Comprehensive Metabolic Panel    2. Mixed hyperlipidemia  Assessment & Plan:  Continues on statin therapy    Orders:  -     Lipid Panel  -     TSH Rfx On Abnormal To Free T4    3. Coronary artery disease involving native coronary artery of native heart without angina pectoris  Assessment & Plan:  Continues on statin therapy, aspirin      4. Bilateral carotid artery stenosis  Assessment & Plan:  11/2024  Clinical Indication    1yr follow up with ctd duplex   Dx: Bilateral carotid artery stenosis [I65.23 (ICD-10-CM)]     Interpretation Summary         Right internal carotid artery demonstrates a less than 50% stenosis.    Antegrade right vertebral flow.    Left internal carotid artery is occluded.    Antegrade left vertebral flow.    Continues on aspirin, statin therapy      5. S/P carotid endarterectomy  Assessment & Plan:  Following with vascular   Continues on statin, aspirin      6. Prediabetes  Assessment & Plan:  A1c today  Continue with low glycemic diet choices including reducing refined carbohydrates and simple sugars in diet. Recommended 150 minutes weekly exercise or as tolerated.       Orders:  -     Hemoglobin A1c    7. Malignant neoplasm of right breast in female, estrogen receptor positive, unspecified site of  breast  Assessment & Plan:  Following with CBC  Mammogram pending 2/27/25  Continues on evista      8. Panlobular emphysema  Assessment & Plan:  Continues on symbicort BID  Reports this has been more expensive than previously due to insurance changes  Will consult SW  Has albuterol if needed    Orders:  -     Complete PFT - Pre & Post Bronchodilator; Future  -     Hemoglobin; Future    9. Osteoporosis, unspecified osteoporosis type, unspecified pathological fracture presence  Assessment & Plan:  Continues on evista  Current recommendations according to the current Physical Activity Guidelines for Americans: adults need 150-300 minutes of physical exercise weekly. It is also recommended to perform two sessions of full body strength training exercise weekly which includes all major muscle groups including legs, hips, back, abdomen, chest, shoulders, and arms.   Dexa pending      10. Tobacco use  Assessment & Plan:  Not ready to discuss quitting at this time      11. Financial difficulties  -     Ambulatory Referral to Social Care Services (Amb Case Mgmt)             Follow Up   Return in about 6 months (around 8/10/2025) for Medicare Wellness.  Patient was given instructions and counseling regarding her condition or for health maintenance advice. Please see specific information pulled into the AVS if appropriate.

## 2025-02-10 NOTE — ASSESSMENT & PLAN NOTE
Continues on symbicort BID  Reports this has been more expensive than previously due to insurance changes  Will consult SW  Has albuterol if needed

## 2025-02-10 NOTE — ASSESSMENT & PLAN NOTE
11/2024  Clinical Indication    1yr follow up with ctd duplex   Dx: Bilateral carotid artery stenosis [I65.23 (ICD-10-CM)]     Interpretation Summary         Right internal carotid artery demonstrates a less than 50% stenosis.    Antegrade right vertebral flow.    Left internal carotid artery is occluded.    Antegrade left vertebral flow.    Continues on aspirin, statin therapy

## 2025-02-11 ENCOUNTER — REFERRAL TRIAGE (OUTPATIENT)
Age: 70
End: 2025-02-11
Payer: MEDICARE

## 2025-02-13 ENCOUNTER — PATIENT OUTREACH (OUTPATIENT)
Age: 70
End: 2025-02-13
Payer: MEDICARE

## 2025-02-13 NOTE — OUTREACH NOTE
Social Work Assessment  Questions/Answers      Flowsheet Row Most Recent Value   Referral Source physician, outpatient staff, outpatient clinic   Reason for Consult community resources, financial concerns, medication concerns   Preferred Language English   Advance Care Planning Reviewed no concerns identified   People in Home alone   Current Living Arrangements apartment   Potentially Unsafe Housing Conditions none   In the past 12 months has the electric, gas, oil, or water company threatened to shut off services in your home? No   Primary Care Provided by self   Employment Status retired   Source of Income social security   Financial/Environmental Concerns unable to afford medication(s)   Application for Public Assistance pending public assistance pending number   Medications independent   Meal Preparation independent   Housekeeping independent   Laundry independent   Shopping independent   If for any reason you need help with day-to-day activities such as bathing, preparing meals, shopping, managing finances, etc., do you get the help you need? I don't need any help   Do you want help finding or keeping work or a job? I do not need or want help   Do you want help with school or training? For example, starting or completing job training or getting a high school diploma, GED or equivalent No   Transportation Concerns none          SDOH updated and reviewed with the patient during this program:  --     Disabilities: Not At Risk (2/13/2025)    Disabilities     Concentrating, Remembering, or Making Decisions Difficulty: no     Doing Errands Independently Difficulty: no      --     Employment: Not At Risk (2/13/2025)    Employment     Do you want help finding or keeping work or a job?: I do not need or want help      Financial Resource Strain: High Risk (2/13/2025)    Overall Financial Resource Strain (CARDIA)     Difficulty of Paying Living Expenses: Hard      --     Food Insecurity: No Food Insecurity (2/13/2025)     Hunger Vital Sign     Worried About Running Out of Food in the Last Year: Never true     Ran Out of Food in the Last Year: Never true      --     Health Literacy: Not At Risk (2/13/2025)    Education     Help with school or training?: No     Preferred Language: English      --     Housing Stability: Low Risk  (2/13/2025)    Housing Stability Vital Sign     Unable to Pay for Housing in the Last Year: No     Number of Times Moved in the Last Year: 1     Homeless in the Last Year: No      --     Transportation Needs: No Transportation Needs (2/13/2025)    PRAPARE - Transportation     Lack of Transportation (Medical): No     Lack of Transportation (Non-Medical): No      --     Utilities: Not At Risk (2/13/2025)    [facility] Utilities     Threatened with loss of utilities: No      Continuing Care   Community & DME   [facility]    [address] [id number]    Phone: [phone]    Request Status: Considering    Services: Food Insecurity Services    Resource for: Food Insecurity   [facility]    [address] [id number]    Phone: [phone]    Request Status: Accepted    Services: Clothing, Financial Assistance, Food Delivery, Food Insecurity Services, Food Pantry    Resource for: Financial Resource Strain, Food Insecurity, Utilities   MEDICARE EXTRA HELP PROGRAM    [address], [place] [address]    Phone: [phone]    Request Status: Accepted    Services: Financial Assistance    Resource for: Financial Resource Strain, Utilities   [facility] [place] [facility]    [address], [facility] [address]    Phone: [phone]    Request Status: Accepted    Services: Financial Assistance    Resource for: Financial Resource Strain, Utilities   [facility] UTILITY ASSISTANCE PROGRAM    [address] [address]    Phone: [phone]    Request Status: Accepted    Services: Financial Assistance     Resource for: Financial Resource Strain, Utilities   [facility]    [address] [address]    Phone: [phone]    Request Status: Accepted    Services: Financial Assistance    Resource for: Financial Resource Strain, [place]   \[facility]\""[place]AR[place] [address] [address]    Phone: [phone]    Request Status: Accepted    Services: Food Insecurity Services    Resource for: Food Insecurity       Patient Outreach    MSW outreach to patient as requested per primary care provider referral for assistance with community resource needs. Patient discussed that she lost her Medicaid at the beginning of this year and now is having difficulty affording her medications. MSW and patient discussed Medicare Extra Help program for assistance with the cost of her prescription drugs. Patient open to applying for Medicare Extra Help program and would like MSW to send information to her via "[employer] on how to apply online and on the phone. MSW has sent information via "[employer]. MSW has also sent information for [place] Prescription Assistance Program ([facility]) for additional medication assistance.     Patient discussed that she also was previously receiving funds for assistance with utility costs and is open to MSW sending information on financial assistance. MSW has included information for [facility], [facility], and "[employer]. Patient also provided with [facility] food bank information as patient states groceries have become expensive. Patient states she is hesitant to utilize food hinojosa, but would still like to obtain the information. All community resource information was sent to patient via e-mail and she will review. MSW has scheduled follow-up in 4 weeks.     Brittaney ARCHER -   Ambulatory Case Management    2/13/2025, 12:34 EST

## 2025-02-25 ENCOUNTER — HOSPITAL ENCOUNTER (OUTPATIENT)
Dept: RESPIRATORY THERAPY | Facility: HOSPITAL | Age: 70
Discharge: HOME | End: 2025-02-25
Admitting: NURSE PRACTITIONER
Payer: MEDICARE

## 2025-02-25 DIAGNOSIS — J43.1 PANLOBULAR EMPHYSEMA: Chronic | ICD-10-CM

## 2025-02-25 LAB
BDY SITE: NORMAL
HGB BLDA-MCNC: 13.1 G/DL (ref 12–18)

## 2025-02-25 PROCEDURE — 94726 PLETHYSMOGRAPHY LUNG VOLUMES: CPT

## 2025-02-25 PROCEDURE — 94060 EVALUATION OF WHEEZING: CPT

## 2025-02-25 PROCEDURE — 82820 HEMOGLOBIN-OXYGEN AFFINITY: CPT | Performed by: NURSE PRACTITIONER

## 2025-02-25 PROCEDURE — 94729 DIFFUSING CAPACITY: CPT

## 2025-02-25 PROCEDURE — 94640 AIRWAY INHALATION TREATMENT: CPT

## 2025-02-25 RX ORDER — ALBUTEROL SULFATE 0.83 MG/ML
2.5 SOLUTION RESPIRATORY (INHALATION) ONCE
Status: COMPLETED | OUTPATIENT
Start: 2025-02-25 | End: 2025-02-25

## 2025-02-25 RX ADMIN — ALBUTEROL SULFATE 2.5 MG: 2.5 SOLUTION RESPIRATORY (INHALATION) at 10:50

## 2025-02-27 ENCOUNTER — HOSPITAL ENCOUNTER (OUTPATIENT)
Dept: BONE DENSITY | Facility: HOSPITAL | Age: 70
Discharge: HOME | End: 2025-02-27
Payer: MEDICARE

## 2025-02-27 ENCOUNTER — HOSPITAL ENCOUNTER (OUTPATIENT)
Dept: MAMMOGRAPHY | Facility: HOSPITAL | Age: 70
Discharge: HOME | End: 2025-02-27
Payer: MEDICARE

## 2025-02-27 DIAGNOSIS — Z78.0 POST-MENOPAUSAL: ICD-10-CM

## 2025-02-27 DIAGNOSIS — Z12.31 SCREENING MAMMOGRAM FOR BREAST CANCER: ICD-10-CM

## 2025-02-27 PROCEDURE — 77067 SCR MAMMO BI INCL CAD: CPT

## 2025-02-27 PROCEDURE — 77063 BREAST TOMOSYNTHESIS BI: CPT

## 2025-02-27 PROCEDURE — 77080 DXA BONE DENSITY AXIAL: CPT

## 2025-03-14 ENCOUNTER — PATIENT OUTREACH (OUTPATIENT)
Age: 70
End: 2025-03-14
Payer: MEDICARE

## 2025-03-14 NOTE — OUTREACH NOTE
Patient Outreach    MSW outreach to patient to follow-up on previously sent community resource information. MSW answered patient's questions about ability to receive assistance due to income. MSW explained that all income information can be found on links provided or by contacting telephone number to discuss further. Patient declined additional assistance is needed at this time and has MSW contact information to follow-up if further assistance is needed. MSW to sign off.    Brittaney ARCHER -   Ambulatory Case Management    3/14/2025, 11:50 EDT

## 2025-04-03 ENCOUNTER — HOSPITAL ENCOUNTER (OUTPATIENT)
Facility: HOSPITAL | Age: 70
Discharge: HOME | End: 2025-04-03
Admitting: NURSE PRACTITIONER
Payer: MEDICARE

## 2025-04-03 DIAGNOSIS — Z72.0 TOBACCO USE: ICD-10-CM

## 2025-04-03 DIAGNOSIS — Z87.891 PERSONAL HISTORY OF NICOTINE DEPENDENCE: ICD-10-CM

## 2025-04-03 PROCEDURE — 71271 CT THORAX LUNG CANCER SCR C-: CPT

## 2025-04-07 NOTE — PROGRESS NOTES
Chief Complaint   Patient presents with    Follow-up       04/08/2025 Interval History:  Patient here for follow-up.  States compliance with Evista.  Tolerating it well.  Occasional hot flashes.  No other issues      ONCOLOGIC HISTORY:  Patient is a 67-year-old female who was found to have an abnormality seen on the screening mammogram.  She had skipped mammograms for many years.  Her most recent mammogram was at least 10 years ago.  She denied having any masses that was palpable.    Details are as follows.    January 17, 2022:  FINDINGS:  The breasts are heterogeneously dense, which may obscure small masses.     There is an area of architectural distortion in the upper inner  posterior right breast, which is best appreciated on Tomosynthesis and  on the MLO view. There are no suspicious masses, calcifications, or  areas of architectural distortion in the left breast.    IMPRESSION/RECOMMENDATION(S):  Right breast architectural distortion. Recommend further evaluation with  CC and MLO spot compression and lateral views with Tomosynthesis and  targeted ultrasound.    February 15, 2022:  Right breast diagnostic mammogram and ultrasound  There is a persistent area of architectural distortion in the upper  central posterior right breast, best appreciated with Tomosynthesis.  This area was further assessed with ultrasound.     ULTRASOUND:  Targeted sonographic evaluation of the right breast was performed from  12:00 to 1:00 in the region of the mammographic abnormality. At 11:30, 4  cm from the nipple, there is an incidental 0.6 x 0.3 x 0.7 cm  benign-appearing cluster of cysts. At 12:00, 4 cm from the nipple, there  is a vague irregular hypoechoic mass/area of shadowing measuring 0.7 x  0.6 x 0.6 cm in the region of the mammographic distortion.        IMPRESSION:  Suspicious 0.7 cm mass/area of shadowing at 12:00 in the right breast.  Recommend further evaluation with ultrasound-guided core needle biopsy  and clip  correlation with post biopsy mammogram. If the biopsy clip does  not correspond to the area of mammographic architectural distortion in  the upper central posterior right breast, further evaluation with  stereotactic/Tomosynthesis guided core needle biopsy would then be  recommended.    March 9, 2022: Ultrasound-guided right breast biopsy at 12:00 showed    Breast, Right, 12:00 o'clock, 4 cm from Nipple, Biopsy:  A. Invasive ductal adenocarcinoma, Latonya grade II (tubular grade=3, nuclear grade=2, mitotic grade=1).  B. Microcalcifications are associated with the invasive tumor and non-neoplastic tissue.  C. Ductal carcinoma in-situ is not identified.  D. The tumor measures up to 6.5 mm.    ER: 99%  AZ: 25%  HER2/sanchez: 1+ negative  Ki-67 3%    March 17, 2022: MRI of the bilateral breast    IMPRESSION:  1. Biopsy-proven malignancy in the right breast in the posterior  one-third at the 12-o'clock position measuring on the order of 1.1 cm in  greatest dimension with the bowtie-shaped metallic clip positioned on  the order of 0.7 cm superior and slightly medial to the epicenter of the  suspected residual malignancy within a hematoma cavity that measures on  the order of 2 cm in greatest dimension. No other suspicious findings  are seen within the right breast and there is no evidence for right  axillary adenopathy.  2. There are no findings suspicious for malignancy in the left breast.     April 28, 2022: Right breast lumpectomy with sentinel lymph node surgery    Synoptic Checklist  INVASIVE CARCINOMA OF THE BREAST: Resection (INVASIVE CARCINOMA OF THE BREAST: COMPLETE EXCISION - All Specimens)  Procedure Excision (less than total mastectomy)  Specimen Laterality Right  .  TUMOR  Tumor Site Clock position  12 o'clock  Histologic Type Invasive carcinoma of no special type (ductal)  Histologic Grade (Latonya Histologic Score)  Glandular (Acinar) / Tubular Differentiation Score 3  Nuclear Pleomorphism  .  1. Breast,  Right.  Mitotic Rate Score 1  Overall Grade Grade 2 (scores of 6 or 7)  Tumor Size Greatest dimension of largest invasive focus (Millimeters): 5 mm  Additional Dimension (Millimeters) 5 mm  5 mm  Tumor Focality Single focus of invasive carcinoma  Ductal Carcinoma In Situ (DCIS) Not identified  Lobular Carcinoma In Situ (LCIS) Not identified  Lymphovascular Invasion Not identified  Dermal Lymphovascular Invasion No skin present  Microcalcifications Present in invasive carcinoma  Present in non-neoplastic tissue  Treatment Effect in the Breast No known presurgical therapy  .  MARGINS  Margin Status for Invasive Carcinoma All margins negative for invasive carcinoma  Distance from Invasive Carcinoma to Closest Margin 8.4 mm  Closest Margin(s) to Invasive Carcinoma Inferior  Distance from Invasive Carcinoma to Anterior Margin 11 mm  Distance from Invasive Carcinoma to Posterior Margin 18 mm  Distance from Invasive Carcinoma to Superior Margin 17 mm  Distance from Invasive Carcinoma to Inferior Margin 8.4 mm  Distance from Invasive Carcinoma to Medial Margin 11 mm  Distance from Invasive Carcinoma to Lateral Margin 22 mm  .  REGIONAL LYMPH NODES  Regional Lymph Node Status All regional lymph nodes negative for tumor  Total Number of Lymph Nodes Examined (sentinel and non-sentinel) 7  Number of [place] Nodes Examined 7  .       Current Outpatient Medications on File Prior to Visit   Medication Sig Dispense Refill    albuterol sulfate  (90 Base) MCG/ACT inhaler Inhale 2 puffs Every 4 (Four) Hours As Needed for Wheezing.      aspirin 81 MG EC tablet Take 1 tablet by mouth Daily.      atorvastatin (LIPITOR) 20 MG tablet TAKE ONE TABLET BY MOUTH ONCE NIGHTLY 90 tablet 0    budesonide-formoterol (Symbicort) 160-4.5 MCG/ACT inhaler INHALE 2 PUFFS BY MOUTH TWICE A DAY 30.6 g 0    irbesartan (AVAPRO) 300 MG tablet TAKE 1 TABLET BY MOUTH DAILY 90 tablet 0    raloxifene (EVISTA) 60 MG tablet Take 1 tablet by mouth Daily for  "90 days. 90 tablet 11     No current facility-administered medications on file prior to visit.        ALLERGIES:  No Known Allergies       I have reviewed Past Medical, Surgical History, Social History, Meds and Allergies.     REVIEW OF SYSTEMS:  See interval History          Objective     Vitals:    04/08/25 1145   BP: 134/62   Pulse: 74   Resp: 16   SpO2: 98%   Weight: 57.8 kg (127 lb 8 oz)   Height: 165.1 cm (65\")   PainSc: 0-No pain             10/22/2024     2:12 PM   Current Status   ECOG score 0       Physical Exam  Constitutional:       Appearance: Normal appearance.   HENT:      Head: Normocephalic and atraumatic.      Mouth/Throat:      Mouth: Mucous membranes are moist.      Pharynx: Oropharynx is clear.   Eyes:      Extraocular Movements: Extraocular movements intact.      Pupils: Pupils are equal, round, and reactive to light.   Cardiovascular:      Rate and Rhythm: Normal rate and regular rhythm.   Pulmonary:      Effort: Pulmonary effort is normal.      Breath sounds: Normal breath sounds.   Chest:       Abdominal:      General: Bowel sounds are normal.      Palpations: Abdomen is soft.   Musculoskeletal:      Cervical back: Normal range of motion and neck supple.   Neurological:      General: No focal deficit present.      Mental Status: She is alert and oriented to person, place, and time.   Psychiatric:         Mood and Affect: Mood normal.         Behavior: Behavior normal.          I have reexamined the patient and the results are consistent with the previously documented exam. Halley Baer MD         RECENT LABS:  CBC:      Lab 04/08/25  1125   WBC 8.10   HEMOGLOBIN 13.8   HEMATOCRIT 42.1   PLATELETS 182   NEUTROS ABS 5.13   IMMATURE GRANS (ABS) 0.02   LYMPHS ABS 2.19   MONOS ABS 0.65   EOS ABS 0.06   MCV 95.7           Assessment & Plan     *Stage IA (pT1a pN0) invasive ductal carcinoma of the right breast, ER/FL positive   4/28/2022 status post right breast lumpectomy with sentinel lymph " node biopsy.  Final path-invasive carcinoma of no special type (ductal), overall grade 2, tumor size 5 mm, no DCIS or LCIS.  Margins negative.  pT1a pN0.  ER 99%, NV 25%, HER2 1+, Ki-67 3%  Patient's Oncotype DX  shows a score of 20  July 2022-status post adjuvant radiation  August 2022-started tamoxifen due to baseline osteoporosis  10/6/2022: Tolerating tamoxifen extremely well  10/20/2022: Patient calling reporting significant depression (reports no history of depression prior), hair loss and weight gain.  Patient started to hold tamoxifen.  12/6/2022: Patient reviewed back today with stabilization of weight and improved mood.  Unfortunately patient has significant underlying osteoporosis and we have not been able to give her Prolia due to ongoing dental issues needing corrected.  I will need to discuss alternative treatment options of any with Dr. Desai.  February 16, 2023: Patient currently is not receiving aromatase inhibitor given the fact that she has osteoporosis which is severe and has had fractures and currently will continue Evista.    October 2024: Patient establish care with me.  Currently on raloxifene  April 2025: Continues to tolerate pravastatin well.  Mammogram from February 2025 BI-RADS 2.    *Osteoporosis  Bone density done January 17, 2022 did show osteoporosis  Dr. Desai had discussed about consideration of starting Prolia but patient refuses Prolia.  She also has gum issues and she has to have many of the teeth pulled.  5/2023 patient prefers to stay on Evista.    Feb 2025 DEXA: Osteoporosis with some worsening bone density.    *Genetics  Patient had history of cervical cancer when she was [age] years  Patient has variation of uncertain significance of the BRCA2 gene.  Clinical significance of the variant is uncertain.  Familial variation of uncertain significance testing is not recommended.    Plan  Continue Evista.    Unfortunately, she has further worsening in her bone density on the  most recent DEXA scan from 2/27/2025.  Discussed endocrinology referral. Pt agreeable. Referral placed  Reviewed results of mammogram from 2/27/2025, BI-RADS 2  Follow-up in 6 months with CBC, CMP.        Halley Baer MD

## 2025-04-08 ENCOUNTER — OFFICE VISIT (OUTPATIENT)
Dept: ONCOLOGY | Facility: CLINIC | Age: 70
End: 2025-04-08
Payer: MEDICARE

## 2025-04-08 ENCOUNTER — LAB (OUTPATIENT)
Dept: LAB | Facility: HOSPITAL | Age: 70
End: 2025-04-08
Payer: MEDICARE

## 2025-04-08 VITALS
WEIGHT: 127.5 LBS | OXYGEN SATURATION: 98 % | HEART RATE: 74 BPM | BODY MASS INDEX: 21.24 KG/M2 | HEIGHT: 65 IN | RESPIRATION RATE: 16 BRPM | DIASTOLIC BLOOD PRESSURE: 62 MMHG | SYSTOLIC BLOOD PRESSURE: 134 MMHG

## 2025-04-08 DIAGNOSIS — M81.0 OSTEOPOROSIS, UNSPECIFIED OSTEOPOROSIS TYPE, UNSPECIFIED PATHOLOGICAL FRACTURE PRESENCE: ICD-10-CM

## 2025-04-08 DIAGNOSIS — Z79.899 HIGH RISK MEDICATION USE: ICD-10-CM

## 2025-04-08 DIAGNOSIS — Z79.810 USE OF RALOXIFENE (EVISTA): ICD-10-CM

## 2025-04-08 DIAGNOSIS — Z17.0 MALIGNANT NEOPLASM OF RIGHT BREAST IN FEMALE, ESTROGEN RECEPTOR POSITIVE, UNSPECIFIED SITE OF BREAST: ICD-10-CM

## 2025-04-08 DIAGNOSIS — C50.911 MALIGNANT NEOPLASM OF RIGHT BREAST IN FEMALE, ESTROGEN RECEPTOR POSITIVE, UNSPECIFIED SITE OF BREAST: Primary | ICD-10-CM

## 2025-04-08 DIAGNOSIS — C50.911 MALIGNANT NEOPLASM OF RIGHT BREAST IN FEMALE, ESTROGEN RECEPTOR POSITIVE, UNSPECIFIED SITE OF BREAST: ICD-10-CM

## 2025-04-08 DIAGNOSIS — Z17.0 MALIGNANT NEOPLASM OF RIGHT BREAST IN FEMALE, ESTROGEN RECEPTOR POSITIVE, UNSPECIFIED SITE OF BREAST: Primary | ICD-10-CM

## 2025-04-08 LAB
ALBUMIN SERPL-MCNC: 4.5 G/DL (ref 3.5–5.2)
ALBUMIN/GLOB SERPL: 2 G/DL
ALP SERPL-CCNC: 84 U/L (ref 39–117)
ALT SERPL W P-5'-P-CCNC: 24 U/L (ref 1–33)
ANION GAP SERPL CALCULATED.3IONS-SCNC: 14.3 MMOL/L (ref 5–15)
AST SERPL-CCNC: 27 U/L (ref 1–32)
BASOPHILS # BLD AUTO: 0.05 10*3/MM3 (ref 0–0.2)
BASOPHILS NFR BLD AUTO: 0.6 % (ref 0–1.5)
BILIRUB SERPL-MCNC: 0.6 MG/DL (ref 0–1.2)
BUN SERPL-MCNC: 9 MG/DL (ref 8–23)
BUN/CREAT SERPL: 11.7 (ref 7–25)
CALCIUM SPEC-SCNC: 9.3 MG/DL (ref 8.6–10.5)
CHLORIDE SERPL-SCNC: 106 MMOL/L (ref 98–107)
CO2 SERPL-SCNC: 22.7 MMOL/L (ref 22–29)
CREAT SERPL-MCNC: 0.77 MG/DL (ref 0.57–1)
DEPRECATED RDW RBC AUTO: 49.5 FL (ref 37–54)
EGFRCR SERPLBLD CKD-EPI 2021: 83.6 ML/MIN/1.73
EOSINOPHIL # BLD AUTO: 0.06 10*3/MM3 (ref 0–0.4)
EOSINOPHIL NFR BLD AUTO: 0.7 % (ref 0.3–6.2)
ERYTHROCYTE [DISTWIDTH] IN BLOOD BY AUTOMATED COUNT: 13.9 % (ref 12.3–15.4)
GLOBULIN UR ELPH-MCNC: 2.2 GM/DL
GLUCOSE SERPL-MCNC: 131 MG/DL (ref 65–99)
HCT VFR BLD AUTO: 42.1 % (ref 34–46.6)
HGB BLD-MCNC: 13.8 G/DL (ref 12–15.9)
IMM GRANULOCYTES # BLD AUTO: 0.02 10*3/MM3 (ref 0–0.05)
IMM GRANULOCYTES NFR BLD AUTO: 0.2 % (ref 0–0.5)
LYMPHOCYTES # BLD AUTO: 2.19 10*3/MM3 (ref 0.7–3.1)
LYMPHOCYTES NFR BLD AUTO: 27 % (ref 19.6–45.3)
MCH RBC QN AUTO: 31.4 PG (ref 26.6–33)
MCHC RBC AUTO-ENTMCNC: 32.8 G/DL (ref 31.5–35.7)
MCV RBC AUTO: 95.7 FL (ref 79–97)
MONOCYTES # BLD AUTO: 0.65 10*3/MM3 (ref 0.1–0.9)
MONOCYTES NFR BLD AUTO: 8 % (ref 5–12)
NEUTROPHILS NFR BLD AUTO: 5.13 10*3/MM3 (ref 1.7–7)
NEUTROPHILS NFR BLD AUTO: 63.5 % (ref 42.7–76)
NRBC BLD AUTO-RTO: 0 /100 WBC (ref 0–0.2)
PLATELET # BLD AUTO: 182 10*3/MM3 (ref 140–450)
PMV BLD AUTO: 11.4 FL (ref 6–12)
POTASSIUM SERPL-SCNC: 4.4 MMOL/L (ref 3.5–5.2)
PROT SERPL-MCNC: 6.7 G/DL (ref 6–8.5)
RBC # BLD AUTO: 4.4 10*6/MM3 (ref 3.77–5.28)
SODIUM SERPL-SCNC: 143 MMOL/L (ref 136–145)
WBC NRBC COR # BLD AUTO: 8.1 10*3/MM3 (ref 3.4–10.8)

## 2025-04-08 PROCEDURE — 80053 COMPREHEN METABOLIC PANEL: CPT

## 2025-04-08 PROCEDURE — 36415 COLL VENOUS BLD VENIPUNCTURE: CPT

## 2025-04-08 PROCEDURE — 85025 COMPLETE CBC W/AUTO DIFF WBC: CPT

## 2025-05-13 RX ORDER — IRBESARTAN 300 MG/1
300 TABLET ORAL DAILY
Qty: 90 TABLET | Refills: 0 | Status: SHIPPED | OUTPATIENT
Start: 2025-05-13

## 2025-05-27 RX ORDER — ATORVASTATIN CALCIUM 20 MG/1
20 TABLET, FILM COATED ORAL NIGHTLY
Qty: 90 TABLET | Refills: 0 | Status: SHIPPED | OUTPATIENT
Start: 2025-05-27

## 2025-08-12 RX ORDER — IRBESARTAN 300 MG/1
300 TABLET ORAL DAILY
Qty: 90 TABLET | Refills: 0 | Status: SHIPPED | OUTPATIENT
Start: 2025-08-12

## (undated) DEVICE — SUT SILK 2/0 FS BLK 18IN 685G

## (undated) DEVICE — SYR LL TP 10ML STRL

## (undated) DEVICE — TUBING, SUCTION, 1/4" X 10', STRAIGHT: Brand: MEDLINE

## (undated) DEVICE — TRAP FLD MINIVAC MEGADYNE 100ML

## (undated) DEVICE — SUT MONOCRYL 4/0 PS2 27IN Y426H ETY426H

## (undated) DEVICE — APPL CHLORAPREP HI/LITE 26ML ORNG

## (undated) DEVICE — NDL HYPO PRECISIONGLIDE REG 25G 1 1/2

## (undated) DEVICE — ELECTRD BLD EZ CLN MOD XLNG 2.75IN

## (undated) DEVICE — STCKNT IMPERV 9X36IN STRL

## (undated) DEVICE — PK UNIV COMPL 40

## (undated) DEVICE — LN SMPL CO2 SHTRM SD STREAM W/M LUER

## (undated) DEVICE — GLV SURG BIOGEL LTX PF 6 1/2

## (undated) DEVICE — ANTIBACTERIAL UNDYED BRAIDED (POLYGLACTIN 910), SYNTHETIC ABSORBABLE SUTURE: Brand: COATED VICRYL

## (undated) DEVICE — SENSR O2 OXIMAX FNGR A/ 18IN NONSTR

## (undated) DEVICE — CANN O2 ETCO2 FITS ALL CONN CO2 SMPL A/ 7IN DISP LF

## (undated) DEVICE — PATIENT RETURN ELECTRODE, SINGLE-USE, CONTACT QUALITY MONITORING, ADULT, WITH 9FT CORD, FOR PATIENTS WEIGING OVER 33LBS. (15KG): Brand: MEGADYNE

## (undated) DEVICE — ADHS SKIN SURG TISS VISC PREMIERPRO EXOFIN HI/VISC FAST/DRY

## (undated) DEVICE — SINGLE-USE BIOPSY FORCEPS: Brand: RADIAL JAW 4

## (undated) DEVICE — TBG PENCL TELESCP MEGADYNE SMOKE EVAC 10FT

## (undated) DEVICE — ADAPT CLN BIOGUARD AIR/H2O DISP

## (undated) DEVICE — CONTAINER,SPECIMEN,OR STERILE,4OZ: Brand: MEDLINE

## (undated) DEVICE — LEGGINGS, PAIR, CLEAR, STERILE: Brand: MEDLINE

## (undated) DEVICE — KT ORCA ORCAPOD DISP STRL